# Patient Record
Sex: FEMALE | Race: WHITE | Employment: OTHER | URBAN - NONMETROPOLITAN AREA
[De-identification: names, ages, dates, MRNs, and addresses within clinical notes are randomized per-mention and may not be internally consistent; named-entity substitution may affect disease eponyms.]

---

## 2020-09-11 RX ORDER — FIBER
1 TABLET ORAL DAILY
COMMUNITY

## 2020-09-11 RX ORDER — MULTIVIT WITH MINERALS/HERBS
1 TABLET ORAL DAILY
COMMUNITY

## 2020-09-11 RX ORDER — SIMVASTATIN 20 MG/1
20 TABLET, FILM COATED ORAL DAILY
COMMUNITY
Start: 2016-07-21 | End: 2020-12-03

## 2020-09-11 RX ORDER — SPIRONOLACTONE 25 MG/1
25 TABLET ORAL DAILY
COMMUNITY
End: 2021-06-07

## 2020-09-11 RX ORDER — LOSARTAN POTASSIUM 100 MG/1
100 TABLET ORAL DAILY
COMMUNITY
End: 2021-02-11

## 2020-09-11 RX ORDER — CETIRIZINE HCL 10 MG
10 TABLET ORAL DAILY
COMMUNITY

## 2020-09-11 RX ORDER — VITAMIN E 268 MG
400 CAPSULE ORAL DAILY
COMMUNITY

## 2020-09-14 ENCOUNTER — HOSPITAL ENCOUNTER (OUTPATIENT)
Dept: LAB | Age: 75
Discharge: HOME | End: 2020-09-14
Payer: MEDICARE

## 2020-09-14 LAB
ALBUMIN SERPL-MCNC: 4.3 G/DL (ref 3.5–4.7)
ANION GAP SERPL CALC-SCNC: 10 MMOL/L
BUN SERPL-MCNC: 28 MG/DL (ref 9–21)
BUN/CREAT SERPL: 20
CA-I BLD-MCNC: 9.7 MG/DL (ref 8.5–10.5)
CHLORIDE SERPL-SCNC: 97 MMOL/L (ref 94–111)
CO2 SERPL-SCNC: 24 MMOL/L (ref 21–33)
CREAT SERPL-MCNC: 1.4 MG/DL (ref 0.7–1.2)
GLUCOSE SERPL-MCNC: 101 MG/DL (ref 70–110)
PHOSPHATE SERPL-MCNC: 3.3 MG/DL (ref 2.5–4.5)
POTASSIUM SERPL-SCNC: 4.7 MMOL/L (ref 3.2–5.1)
SODIUM SERPL-SCNC: 131 MMOL/L (ref 135–145)

## 2020-09-14 PROCEDURE — 80069 RENAL FUNCTION PANEL: CPT

## 2020-09-14 PROCEDURE — 36415 COLL VENOUS BLD VENIPUNCTURE: CPT

## 2020-09-24 ENCOUNTER — OFFICE VISIT (OUTPATIENT)
Dept: NEPHROLOGY | Age: 75
End: 2020-09-24
Payer: MEDICARE

## 2020-09-24 VITALS
HEART RATE: 97 BPM | HEIGHT: 65 IN | BODY MASS INDEX: 29.49 KG/M2 | SYSTOLIC BLOOD PRESSURE: 121 MMHG | WEIGHT: 177 LBS | DIASTOLIC BLOOD PRESSURE: 69 MMHG | TEMPERATURE: 97.1 F

## 2020-09-24 DIAGNOSIS — N17.9 AKI (ACUTE KIDNEY INJURY) (HCC): Primary | ICD-10-CM

## 2020-09-24 PROCEDURE — G8427 DOCREV CUR MEDS BY ELIG CLIN: HCPCS | Performed by: INTERNAL MEDICINE

## 2020-09-24 PROCEDURE — 99214 OFFICE O/P EST MOD 30 MIN: CPT | Performed by: INTERNAL MEDICINE

## 2020-09-24 RX ORDER — NILOTINIB 150 MG/1
300 CAPSULE ORAL EVERY 12 HOURS
COMMUNITY

## 2020-09-24 NOTE — PROGRESS NOTES
Fallon Portillo presents today for   Chief Complaint   Patient presents with    Routine     Routine visit with lab review        Is someone accompanying this pt? Patient is alone for her appt     Is the patient using any DME equipment during OV? None     Depression Screening:  3 most recent PHQ Screens 9/24/2020   Little interest or pleasure in doing things Not at all   Feeling down, depressed, irritable, or hopeless Not at all   Total Score PHQ 2 0       Learning Assessment:  No flowsheet data found. Abuse Screening:  No flowsheet data found. Fall Risk  Fall Risk Assessment, last 12 mths 9/24/2020   Able to walk? Yes   Fall in past 12 months? No       ADL  No flowsheet data found. Health Maintenance reviewed and discussed and ordered per Provider. Health Maintenance Due   Topic Date Due    Hepatitis C Screening  1945    Lipid Screen  11/12/1955    DTaP/Tdap/Td series (1 - Tdap) 11/12/1966    Shingrix Vaccine Age 50> (1 of 2) 11/12/1995    Breast Cancer Screen Mammogram  11/12/1995    FOBT Q1Y Age 50-75  11/12/1995    Bone Densitometry (Dexa) Screening  11/12/2010    Pneumococcal 65+ years (1 of 1 - PPSV23) 11/12/2010    Medicare Yearly Exam  03/12/2020    Flu Vaccine (1) 09/01/2020   . Coordination of Care:  1. Have you been to the ER, urgent care clinic since your last visit? Hospitalized since your last visit? no    2. Have you seen or consulted any other health care providers outside of the [address] [place] [address] since your last visit? Include any pap smears or colon screening.  no

## 2020-09-24 NOTE — PROGRESS NOTES
Renal Progress Note           Daily Progress Note: 9/24/2020      Subjective: The patient is seen for follow up of her hypertension. She denies any complaints at today's visit. Reports her blood pressure to be very well controlled. Denies any swelling anymore    Medications reviewed  Current Outpatient Medications   Medication Sig    nilotinib (Tasigna) 150 mg cap capsule Take 300 mg by mouth every twelve (12) hours. Take 2 tabs every 12 hours    simvastatin (ZOCOR) 20 mg tablet Take 20 mg by mouth daily.  cetirizine (ZYRTEC) 10 mg tablet Take 10 mg by mouth daily.  losartan (COZAAR) 100 mg tablet Take 100 mg by mouth daily.  multivitamin-min-iron-FA-vit K (Multi-Day Plus Minerals) 18 mg iron-400 mcg-25 mcg tab Take 1 Tab by mouth daily.  spironolactone (ALDACTONE) 50 mg tablet Take 50 mg by mouth two (2) times a day.  b complex vitamins tablet Take 1 Tab by mouth daily.  vitamin E (AQUA GEMS) 400 unit capsule Take 400 Units by mouth daily.  TURMERIC PO Take  by mouth. No current facility-administered medications for this visit. Review of Systems:   Was asked all review of system and she denies negative for everything other than the ones listed in HPI    Objective:   Physical Exam:     Visit Vitals  /69 (BP 1 Location: Left arm, BP Patient Position: Sitting)   Pulse 97   Temp 97.1 °F (36.2 °C) (Temporal)   Ht 5' 5\" (1.651 m)   Wt 80.3 kg (177 lb)   BMI 29.45 kg/m²             General:  Alert, cooperative, no distress, appears stated age. Lungs:   Clear to auscultation bilaterally. Chest wall:  No tenderness or deformity. Heart:  Regular rate and rhythm, S1, S2 normal, no murmur, click, rub or gallop. Abdomen:   Soft, non-tender. Bowel sounds normal. No masses,  No organomegaly. Extremities: Extremities normal, atraumatic, no cyanosis or edema. Pulses: 2+ and symmetric all extremities.    Skin: Skin color, texture, turgor normal. No rashes or lesions   Neurologic: CNII-XII intact. No gross sensory or motor deficits     Data Review:       Labs on 9/14 show sodium 131 potassium 4.7 chloride 97 CO2 24 creatinine 1.4 BUN 28 GFR of 37    Assessment/Plan:     1. Acute kidney injury  -Creatinine is increased to 1.4. Exact baseline creatinine is unknown  -I will repeat labs in about 4 weeks and if creatinine is not any better I will discontinue losartan and hold spironolactone to allow renal recovery  -She denies any needs at home  -clinically euvolemic  -Urine is bland. Quantify proteinuria    2. Hypertension  -Blood pressure is very well controlled after changes were made at the last visit. Swelling has gone after discontinuing amlodipine  -Losartan was started at the last visit instead of lisinopril because of dry cough. She has no dry cough now  -Hydrochlorothiazide was discontinued because of hypokalemia and spironolactone was increased to 50 mg twice daily  -I am decreasing spironolactone to 50 mg daily. Potassium is 4.7 on recent labs. She is also complaining of 1 and 2 low blood pressures reads at home    3. Bilateral leg edema  -This is resolved after discontinuing non-dihydropyridine calcium channel blockers starting spironolactone    4. Chronic hypokalemia  -Was likely secondary to hydrochlorothiazide which was discontinued at the last visit and spironolactone was started  -Potassium is 4.7 on recent labs. She has stopped taking potassium supplementation  -Repeat labs prior to next visit    5.   History of CML  -In remission for the past 5 years    I will see her back in the office in 2 months with Tamie Ernandez MD

## 2020-12-03 RX ORDER — SIMVASTATIN 20 MG/1
TABLET, FILM COATED ORAL
Qty: 30 TAB | Refills: 0 | Status: SHIPPED | OUTPATIENT
Start: 2020-12-03 | End: 2020-12-31

## 2020-12-07 ENCOUNTER — HOSPITAL ENCOUNTER (OUTPATIENT)
Dept: LAB | Age: 75
Discharge: HOME | End: 2020-12-07
Payer: MEDICARE

## 2020-12-07 DIAGNOSIS — N17.9 AKI (ACUTE KIDNEY INJURY) (HCC): ICD-10-CM

## 2020-12-07 LAB
ALBUMIN SERPL-MCNC: 4.2 G/DL (ref 3.5–4.7)
ANION GAP SERPL CALC-SCNC: 11 MMOL/L
APPEARANCE UR: CLEAR
BACTERIA URNS QL MICRO: ABNORMAL /HPF
BILIRUB UR QL: NEGATIVE
BUN SERPL-MCNC: 10 MG/DL (ref 9–21)
BUN/CREAT SERPL: 9
CA-I BLD-MCNC: 9.5 MG/DL (ref 8.5–10.5)
CA-I BLD-MCNC: 9.5 MG/DL (ref 8.5–10.5)
CHLORIDE SERPL-SCNC: 101 MMOL/L (ref 94–111)
CO2 SERPL-SCNC: 25 MMOL/L (ref 21–33)
COLOR UR: ABNORMAL
CREAT SERPL-MCNC: 1.1 MG/DL (ref 0.7–1.2)
EPITH CASTS URNS QL MICRO: ABNORMAL /LPF (ref 0–20)
GLUCOSE SERPL-MCNC: 99 MG/DL (ref 70–110)
GLUCOSE UR STRIP.AUTO-MCNC: NEGATIVE MG/DL
HGB UR QL STRIP: NEGATIVE
KETONES UR QL STRIP.AUTO: NEGATIVE MG/DL
LEUKOCYTE ESTERASE UR QL STRIP.AUTO: ABNORMAL
NITRITE UR QL STRIP.AUTO: NEGATIVE
PH UR STRIP: 6 [PH] (ref 5–9)
PHOSPHATE SERPL-MCNC: 3.1 MG/DL (ref 2.5–4.5)
POTASSIUM SERPL-SCNC: 4.1 MMOL/L (ref 3.2–5.1)
PROT UR STRIP-MCNC: NEGATIVE MG/DL
PTH-INTACT SERPL-MCNC: 41.9 PG/ML (ref 12–88)
RBC #/AREA URNS HPF: ABNORMAL /HPF (ref 0–2)
SODIUM SERPL-SCNC: 137 MMOL/L (ref 135–145)
SP GR UR REFRACTOMETRY: 1.01 (ref 1–1.03)
UROBILINOGEN UR QL STRIP.AUTO: 0.2 EU/DL (ref 0.2–1)
WBC URNS QL MICRO: ABNORMAL /HPF (ref 0–4)

## 2020-12-07 PROCEDURE — 36415 COLL VENOUS BLD VENIPUNCTURE: CPT

## 2020-12-07 PROCEDURE — 80069 RENAL FUNCTION PANEL: CPT

## 2020-12-07 PROCEDURE — 81001 URINALYSIS AUTO W/SCOPE: CPT

## 2020-12-07 PROCEDURE — 83970 ASSAY OF PARATHORMONE: CPT

## 2020-12-16 ENCOUNTER — OFFICE VISIT (OUTPATIENT)
Dept: NEPHROLOGY | Age: 75
End: 2020-12-16
Payer: MEDICARE

## 2020-12-16 VITALS
WEIGHT: 179 LBS | BODY MASS INDEX: 29.82 KG/M2 | HEIGHT: 65 IN | DIASTOLIC BLOOD PRESSURE: 82 MMHG | SYSTOLIC BLOOD PRESSURE: 136 MMHG

## 2020-12-16 DIAGNOSIS — N17.9 AKI (ACUTE KIDNEY INJURY) (HCC): Primary | ICD-10-CM

## 2020-12-16 PROCEDURE — 99214 OFFICE O/P EST MOD 30 MIN: CPT | Performed by: INTERNAL MEDICINE

## 2020-12-16 NOTE — PROGRESS NOTES
Renal Progress Note           Daily Progress Note: 12/16/2020      Subjective: The patient is seen for follow  up of CKD. HPI : Patient is here for a follow-up. Denies any complaints today. Denies any shortness of breath or swelling in the legs. Denies any chest pain. Denies any dysuria    Medications reviewed  Current Outpatient Medications   Medication Sig    folic acid-B complex,C no.17 5 mg tab Take  by mouth.  simvastatin (ZOCOR) 20 mg tablet TAKE 1 TABLET BY MOUTH ONCE DAILY (PATIENT  NEEDS  APPOINTMENT  FOR  FURTHER  REFILLS)    nilotinib (Tasigna) 150 mg cap capsule Take 300 mg by mouth every twelve (12) hours. Take 2 tabs every 12 hours    cetirizine (ZYRTEC) 10 mg tablet Take 10 mg by mouth daily.  losartan (COZAAR) 100 mg tablet Take 100 mg by mouth daily.  multivitamin-min-iron-FA-vit K (Multi-Day Plus Minerals) 18 mg iron-400 mcg-25 mcg tab Take 1 Tab by mouth daily.  spironolactone (ALDACTONE) 50 mg tablet Take 25 mg by mouth daily.  b complex vitamins tablet Take 1 Tab by mouth daily.  vitamin E (AQUA GEMS) 400 unit capsule Take 400 Units by mouth daily.  TURMERIC PO Take  by mouth. No current facility-administered medications for this visit. Review of Systems:     Constitutional: Negative for chills and fever. HENT: Negative. Eyes: Negative. Respiratory: Negative. Cardiovascular: Negative. Gastrointestinal: Negative for abdominal pain and nausea. Skin: Negative. Neurological: Negative. Objective:   Physical Exam:     Visit Vitals  /82 (BP 1 Location: Left arm, BP Patient Position: Sitting)   Ht 5' 5\" (1.651 m)   Wt 81.2 kg (179 lb)   BMI 29.79 kg/m²           @TMAX(24)@  [unfilled]   [unfilled]    General:  Alert, cooperative, no distress, appears stated age. Lungs:   Clear to auscultation bilaterally. Chest wall:  No tenderness or deformity.    Heart:  Regular rate and rhythm, S1, S2 normal, no murmur, click, rub or gallop. Abdomen:   Soft, non-tender. Bowel sounds normal. No masses,  No organomegaly. Extremities: Extremities normal, atraumatic, no cyanosis or edema. Pulses: 2+ and symmetric all extremities. Skin: Skin color, texture, turgor normal. No rashes or lesions   Neurologic: CNII-XII intact. No gross sensory or motor deficits     Data Review:       Recent Days:  Labs from 12/7 show sodium 137 potassium 4.1 chloride 101 CO2 23 glucose 99 BUN 10 creatinine 1.1 calcium 9.5 phosphorus 3.1 albumin 4.2    Assessment/Plan:     1. Acute kidney injury  -Creatinine is improved from 1.4-->1.1. Exact baseline creatinine is unknown  -I will continue losartan and spironolactone at current dose  -She denies any needs at home  -clinically euvolemic  -Urine is bland. Quantify proteinuria     2. Hypertension  -Blood pressure is very well controlled after changes were made at  Prior visit.    -Swelling has gone after discontinuing amlodipine  -Losartan was started instead of lisinopril because of dry cough. She has no dry cough now  -Hydrochlorothiazide was discontinued because of hypokalemia and spironolactone was decreased to 25 mg daily because of low blood pressure readings. Potassium is 4.1 on recent labs. 3.  Bilateral leg edema  -This is resolved after discontinuing non-dihydropyridine calcium channel blockers starting spironolactone     4. Chronic hypokalemia  -Was likely secondary to hydrochlorothiazide which was discontinued at the last visit and spironolactone was started  -Potassium is 4.7 on recent labs. She has stopped taking potassium supplementation  -Repeat labs prior to next visit     5.   History of CML  -In remission for the past 5 years     I will see her back in the office in 2 months with Berny Rush MD

## 2020-12-16 NOTE — PROGRESS NOTES
Damian Youssef presents today for   Chief Complaint   Patient presents with    Follow-up     2 month       Is someone accompanying this pt? no    Is the patient using any DME equipment during OV? no    Depression Screening:  3 most recent PHQ Screens 12/16/2020   Little interest or pleasure in doing things Not at all   Feeling down, depressed, irritable, or hopeless Not at all   Total Score PHQ 2 0       Learning Assessment:  No flowsheet data found. Abuse Screening:  No flowsheet data found. Fall Risk  Fall Risk Assessment, last 12 mths 12/16/2020   Able to walk? Yes   Fall in past 12 months? No       Coordination of Care:  1. Have you been to the ER, urgent care clinic since your last visit? Hospitalized since your last visit? no    2. Have you seen or consulted any other health care providers outside of the [address] [place] [address] since your last visit? Include any pap smears or colon screening.  no

## 2020-12-31 RX ORDER — SIMVASTATIN 20 MG/1
TABLET, FILM COATED ORAL
Qty: 30 TAB | Refills: 0 | Status: SHIPPED | OUTPATIENT
Start: 2020-12-31 | End: 2021-01-11

## 2021-01-11 RX ORDER — SIMVASTATIN 20 MG/1
TABLET, FILM COATED ORAL
Qty: 30 TAB | Refills: 0 | Status: SHIPPED | OUTPATIENT
Start: 2021-01-11 | End: 2021-03-09

## 2021-01-28 ENCOUNTER — OFFICE VISIT (OUTPATIENT)
Dept: FAMILY MEDICINE CLINIC | Age: 76
End: 2021-01-28
Payer: MEDICARE

## 2021-01-28 VITALS
HEART RATE: 78 BPM | OXYGEN SATURATION: 97 % | SYSTOLIC BLOOD PRESSURE: 138 MMHG | DIASTOLIC BLOOD PRESSURE: 82 MMHG | RESPIRATION RATE: 19 BRPM | WEIGHT: 178 LBS | HEIGHT: 65 IN | BODY MASS INDEX: 29.66 KG/M2

## 2021-01-28 DIAGNOSIS — Z13.31 SCREENING FOR DEPRESSION: ICD-10-CM

## 2021-01-28 DIAGNOSIS — E03.9 HYPOTHYROIDISM, UNSPECIFIED TYPE: ICD-10-CM

## 2021-01-28 DIAGNOSIS — E78.2 MIXED HYPERLIPIDEMIA: ICD-10-CM

## 2021-01-28 DIAGNOSIS — C92.10 CML (CHRONIC MYELOCYTIC LEUKEMIA) (HCC): Primary | ICD-10-CM

## 2021-01-28 DIAGNOSIS — E53.8 VITAMIN B12 DEFICIENCY: ICD-10-CM

## 2021-01-28 DIAGNOSIS — Z00.00 MEDICARE ANNUAL WELLNESS VISIT, SUBSEQUENT: ICD-10-CM

## 2021-01-28 DIAGNOSIS — Z11.59 NEED FOR HEPATITIS C SCREENING TEST: ICD-10-CM

## 2021-01-28 DIAGNOSIS — Z13.39 SCREENING FOR ALCOHOLISM: ICD-10-CM

## 2021-01-28 DIAGNOSIS — Z13.29 SCREENING FOR THYROID DISORDER: ICD-10-CM

## 2021-01-28 DIAGNOSIS — E55.9 VITAMIN D DEFICIENCY: ICD-10-CM

## 2021-01-28 PROCEDURE — G8536 NO DOC ELDER MAL SCRN: HCPCS | Performed by: NURSE PRACTITIONER

## 2021-01-28 PROCEDURE — G8419 CALC BMI OUT NRM PARAM NOF/U: HCPCS | Performed by: NURSE PRACTITIONER

## 2021-01-28 PROCEDURE — 1101F PT FALLS ASSESS-DOCD LE1/YR: CPT | Performed by: NURSE PRACTITIONER

## 2021-01-28 PROCEDURE — G8400 PT W/DXA NO RESULTS DOC: HCPCS | Performed by: NURSE PRACTITIONER

## 2021-01-28 PROCEDURE — G8432 DEP SCR NOT DOC, RNG: HCPCS | Performed by: NURSE PRACTITIONER

## 2021-01-28 PROCEDURE — 99214 OFFICE O/P EST MOD 30 MIN: CPT | Performed by: NURSE PRACTITIONER

## 2021-01-28 PROCEDURE — 3017F COLORECTAL CA SCREEN DOC REV: CPT | Performed by: NURSE PRACTITIONER

## 2021-01-28 PROCEDURE — G0439 PPPS, SUBSEQ VISIT: HCPCS | Performed by: NURSE PRACTITIONER

## 2021-01-28 PROCEDURE — 1090F PRES/ABSN URINE INCON ASSESS: CPT | Performed by: NURSE PRACTITIONER

## 2021-01-28 PROCEDURE — G8427 DOCREV CUR MEDS BY ELIG CLIN: HCPCS | Performed by: NURSE PRACTITIONER

## 2021-01-28 NOTE — PROGRESS NOTES
Rachel Javed is a 76 y. o.female presents with   Chief Complaint   Patient presents with    Annual Wellness Visit       28-year-old female presents today in office for annual Medicare wellness exam as well as routine follow-up related to essential hypertension she does not have any current blood pressure readings for me today however she is also followed by Dr. Gavin Armendariz nephrology. Her last visit with her according to her note was December 2020. Blood pressure much better controlled currently. Patient does have history of CML currently at 6-year palmer remission. Patient followed by [facility] related to mitral valve prolapse she verbalizes no complaints of today. Subjective:           Past Medical History:   Diagnosis Date    Aortic valve calcification     Cancer ([address]     Leukemia     Hypercholesterolemia     Hypertension     MVP (mitral valve prolapse)      Past Surgical History:   Procedure Laterality Date    HX CATARACT REMOVAL  2008    HX CATARACT REMOVAL  2013    HX COLONOSCOPY  2008    1 polyp     HX COLONOSCOPY  2013    normal repeat 2018    HX MALIGNANT SKIN LESION EXCISION      HX TUBAL LIGATION       Social History     Socioeconomic History    Marital status:      Spouse name: Not on file    Number of children: Not on file    Years of education: Not on file    Highest education level: Not on file   Tobacco Use    Smoking status: Never Smoker    Smokeless tobacco: Never Used   Substance and Sexual Activity    Alcohol use: Not Currently     Comment: occasional    Drug use: Not Currently     Current Outpatient Medications   Medication Sig Dispense Refill    simvastatin (ZOCOR) 20 mg tablet TAKE 1 TABLET BY MOUTH ONCE DAILY . APPOINTMENT REQUIRED FOR FUTURE REFILLS 30 Tab 0    folic acid-B complex,C no.17 5 mg tab Take  by mouth.  nilotinib (Tasigna) 150 mg cap capsule Take 300 mg by mouth every twelve (12) hours.  Take 2 tabs every 12 hours      cetirizine (ZYRTEC) 10 mg tablet Take 10 mg by mouth daily.  losartan (COZAAR) 100 mg tablet Take 100 mg by mouth daily.  multivitamin-min-iron-FA-vit K (Multi-Day Plus Minerals) 18 mg iron-400 mcg-25 mcg tab Take 1 Tab by mouth daily.  spironolactone (ALDACTONE) 25 mg tablet Take 25 mg by mouth daily.  b complex vitamins tablet Take 1 Tab by mouth daily.  vitamin E (AQUA GEMS) 400 unit capsule Take 400 Units by mouth daily.  TURMERIC PO Take  by mouth. Allergies   Allergen Reactions    Pneumovax-23 [Pneumococcal 23-Alicia Ps Vaccine] Swelling     The patient has a family history of    REVIEW OF SYSTEMS  Review of Systems   Constitutional: Negative for chills and fever. HENT: Negative for ear discharge, ear pain, hearing loss, sinus pain and sore throat. Eyes: Negative for pain. Respiratory: Negative for cough and shortness of breath. Cardiovascular: Negative for chest pain, palpitations and leg swelling. Gastrointestinal: Negative for abdominal pain, nausea and vomiting. Genitourinary: Negative for dysuria, frequency and urgency. Musculoskeletal: Negative for falls, myalgias and neck pain. Skin: Negative for rash. Neurological: Negative for dizziness, tingling, tremors and headaches. Psychiatric/Behavioral: Negative for depression, substance abuse and suicidal ideas. The patient is not nervous/anxious and does not have insomnia.         Objective:     Visit Vitals  /82 (BP 1 Location: Left arm, BP Patient Position: Sitting)   Pulse 78   Resp 19   Ht 5' 5\" (1.651 m)   Wt 178 lb (80.7 kg)   SpO2 97%   BMI 29.62 kg/m²       Current Outpatient Medications   Medication Instructions    b complex vitamins tablet 1 Tab, Oral, DAILY    cetirizine (ZYRTEC) 10 mg, Oral, DAILY    folic acid-B complex,C no.17 5 mg tab Oral    losartan (COZAAR) 100 mg, Oral, DAILY    multivitamin-min-iron-FA-vit K (Multi-Day Plus Minerals) 18 mg iron-400 mcg-25 mcg tab 1 Tab, Oral, DAILY    simvastatin (ZOCOR) 20 mg tablet TAKE 1 TABLET BY MOUTH ONCE DAILY . APPOINTMENT REQUIRED FOR FUTURE REFILLS    spironolactone (ALDACTONE) 25 mg, Oral, DAILY    Tasigna 300 mg, Oral, EVERY 12 HOURS, Take 2 tabs every 12 hours     TURMERIC PO Oral    vitamin E (AQUA GEMS) 400 Units, Oral, DAILY        PHYSICAL EXAM  Physical Exam  Constitutional:       General: She is not in acute distress. Appearance: She is not ill-appearing. HENT:      Head: Normocephalic and atraumatic. Right Ear: Tympanic membrane, ear canal and external ear normal.      Left Ear: Tympanic membrane, ear canal and external ear normal.      Nose: Nose normal.      Mouth/Throat:      Mouth: Mucous membranes are moist.      Pharynx: Oropharynx is clear. Eyes:      Extraocular Movements: Extraocular movements intact. Conjunctiva/sclera: Conjunctivae normal.      Pupils: Pupils are equal, round, and reactive to light. Neck:      Musculoskeletal: Normal range of motion and neck supple. No neck rigidity or muscular tenderness. Cardiovascular:      Rate and Rhythm: Normal rate and regular rhythm. Pulses: Normal pulses. Heart sounds: Normal heart sounds. Pulmonary:      Effort: Pulmonary effort is normal.      Breath sounds: Normal breath sounds. Musculoskeletal: Normal range of motion. Lymphadenopathy:      Cervical: No cervical adenopathy. Skin:     General: Skin is warm and dry. Capillary Refill: Capillary refill takes less than 2 seconds. Neurological:      General: No focal deficit present. Mental Status: She is alert and oriented to person, place, and time. Cranial Nerves: No cranial nerve deficit. Psychiatric:         Mood and Affect: Mood normal.         Behavior: Behavior normal.         Thought Content: Thought content normal.         Judgment: Judgment normal.         Assessment/Plan:     Diagnoses and all orders for this visit:    1.  CML (chronic myelocytic leukemia) (HCC)  -     CBC W/O DIFF; Future  -     METABOLIC PANEL, COMPREHENSIVE; Future  6 years remission    2. Medicare annual wellness visit, subsequent    3. Screening for alcoholism    4. Screening for depression  -     IA ANNUAL ALCOHOL SCREEN 15 MIN  -     DEPRESSION SCREEN ANNUAL    5. Need for hepatitis C screening test  -     HEPATITIS C AB; Future  -Labs today any changes to current treatment contingent upon those findings    6. Vitamin B12 deficiency  -     VITAMIN B12; Future  -Labs today any changes to current treatment contingent upon those findings    7. Screening for thyroid disorder  -     TSH 3RD GENERATION; Future  -Labs today any changes to current treatment contingent upon those findings    8. Mixed hyperlipidemia  -     LIPID PANEL; Future  -Labs today any changes to current treatment contingent upon those findings    9. Vitamin D deficiency  -     VITAMIN D, 25 HYDROXY; Future  -Labs today any changes to current treatment contingent upon those findings    10. Hypothyroidism, unspecified type   -     TSH 3RD GENERATION; Future    -Labs today any changes to current treatment contingent upon those findings      Follow-up and Dispositions    · Return in about 6 months (around 7/28/2021) for in office, routine. Disclaimer:    I have discussed the diagnosis with the patient and the intended plan as seen above. The patient understands our medical plan. The risks, benefits and significant side effects of all medications have been reviewed. Anticipated time course and progression of condition reviewed. All questions have been addressed. She received an after visit summary, with information reviewed, and questions answered. Where appropriate, she is instructed to call the clinic if she has not been notified either by phone or through [address] with the results of her tests or with an appointment plan for any referrals within 1 week(s).  The patient  is to call if her condition worsens or fails to improve or if significant side effects are experienced.        Lurlean Habermann, NP

## 2021-01-28 NOTE — PATIENT INSTRUCTIONS
Medicare Wellness Visit, Female The best way to live healthy is to have a lifestyle where you eat a well-balanced diet, exercise regularly, limit alcohol use, and quit all forms of tobacco/nicotine, if applicable. Regular preventive services are another way to keep healthy. Preventive services (vaccines, screening tests, monitoring & exams) can help personalize your care plan, which helps you manage your own care. Screening tests can find health problems at the earliest stages, when they are easiest to treat. [facility] follows the current, evidence-based guidelines published by the [facility] ([facility]) when recommending preventive services for our patients. Because we follow these guidelines, sometimes recommendations change over time as research supports it. (For example, mammograms used to be recommended annually. Even though Medicare will still pay for an annual mammogram, the newer guidelines recommend a mammogram every two years for women of average risk). Of course, you and your doctor may decide to screen more often for some diseases, based on your risk and your co-morbidities (chronic disease you are already diagnosed with). Preventive services for you include: - Medicare offers their members a free annual wellness visit, which is time for you and your primary care provider to discuss and plan for your preventive service needs. Take advantage of this benefit every year! 
-All adults over the age of 72 should receive the recommended pneumonia vaccines. Current USPSTF guidelines recommend a series of two vaccines for the best pneumonia protection.  
-All adults should have a flu vaccine yearly and a tetanus vaccine every 10 years.  
-All adults age 48 and older should receive the shingles vaccines (series of two vaccines). -All adults age 38-68 who are overweight should have a diabetes screening test once every three years. -All adults born between [birth year] and 1965 should be screened once for Hepatitis C. 
-Other screening tests and preventive services for persons with diabetes include: an eye exam to screen for diabetic retinopathy, a kidney function test, a foot exam, and stricter control over your cholesterol.  
-Cardiovascular screening for adults with routine risk involves an electrocardiogram (ECG) at intervals determined by your doctor.  
-Colorectal cancer screenings should be done for adults age 54-65 with no increased risk factors for colorectal cancer. There are a number of acceptable methods of screening for this type of cancer. Each test has its own benefits and drawbacks. Discuss with your doctor what is most appropriate for you during your annual wellness visit. The different tests include: colonoscopy (considered the best screening method), a fecal occult blood test, a fecal DNA test, and sigmoidoscopy. 
 
-A bone mass density test is recommended when a woman turns 65 to screen for osteoporosis. This test is only recommended one time, as a screening. Some providers will use this same test as a disease monitoring tool if you already have osteoporosis. -Breast cancer screenings are recommended every other year for women of normal risk, age 54-69. 
-Cervical cancer screenings for women over age 72 are only recommended with certain risk factors. Here is a list of your current Health Maintenance items (your personalized list of preventive services) with a due date: 
Health Maintenance Due Topic Date Due  
 Hepatitis C Test  1945  Cholesterol Test   11/12/1955  COVID-19 Vaccine (1 of 2) 11/12/1961  
 DTaP/Tdap/Td  (1 - Tdap) 11/12/1966  Shingles Vaccine (1 of 2) 11/12/1995  Colorectal Screening  11/12/1995  Bone Mineral Density   11/12/2010  Pneumococcal Vaccine (1 of 1 - PPSV23) 11/12/2010  Yearly Flu Vaccine (1) 09/01/2020

## 2021-01-28 NOTE — PROGRESS NOTES
This is the Subsequent Medicare Annual Wellness Exam, performed 12 months or more after the Initial AWV or the last Subsequent AWV    I have reviewed the patient's medical history in detail and updated the computerized patient record. Depression Risk Factor Screening:     3 most recent PHQ Screens 1/28/2021   Little interest or pleasure in doing things Not at all   Feeling down, depressed, irritable, or hopeless Not at all   Total Score PHQ 2 0       Alcohol Risk Screen    Do you average more than 1 drink per night or more than 7 drinks a week:  No    On any one occasion in the past three months have you have had more than 3 drinks containing alcohol:  No        Functional Ability and Level of Safety:    Hearing: Hearing is good. Activities of Daily Living: The home contains: handrails  Patient does total self care      Ambulation: with no difficulty     Fall Risk:  Fall Risk Assessment, last 12 mths 1/28/2021   Able to walk? Yes   Fall in past 12 months? 0   Do you feel unsteady? 0   Are you worried about falling 0      Abuse Screen:  Patient is not abused       Cognitive Screening    Has your family/caregiver stated any concerns about your memory: no         Assessment/Plan   Education and counseling provided:  Are appropriate based on today's review and evaluation    Diagnoses and all orders for this visit:    1. Medicare annual wellness visit, subsequent    2. Screening for alcoholism    3.  Screening for depression  -     DEPRESSION SCREEN ANNUAL    Other orders  -     AL ANNUAL ALCOHOL SCREEN 15 MIN        Health Maintenance Due     Health Maintenance Due   Topic Date Due    Hepatitis C Screening  1945    Lipid Screen  11/12/1955    DTaP/Tdap/Td series (1 - Tdap) 11/12/1966    Shingrix Vaccine Age 50> (1 of 2) 11/12/1995    Colorectal Cancer Screening Combo  11/12/1995    Bone Densitometry (Dexa) Screening  11/12/2010     Patient refused:   Influenza vaccine   COVID vaccine   Pneumonia Vaccine (ALLERGY TO VACCINE)   Colonoscopy order      Patient Care Team   Patient Care Team:  Rob Mitchell NP as PCP - General (Nurse Practitioner)  Rob Mitchell NP as PCP - [facility] Empaneled Provider    History   There is no problem list on file for this patient. Past Medical History:   Diagnosis Date    Aortic valve calcification     Cancer (HCC)     Leukemia     Hypercholesterolemia     Hypertension     MVP (mitral valve prolapse)       Past Surgical History:   Procedure Laterality Date    HX CATARACT REMOVAL  2008    HX CATARACT REMOVAL  2013    HX COLONOSCOPY  2008    1 polyp     HX COLONOSCOPY  2013    normal repeat 2018    HX MALIGNANT SKIN LESION EXCISION      HX TUBAL LIGATION       Current Outpatient Medications   Medication Sig Dispense Refill    simvastatin (ZOCOR) 20 mg tablet TAKE 1 TABLET BY MOUTH ONCE DAILY . APPOINTMENT REQUIRED FOR FUTURE REFILLS 30 Tab 0    folic acid-B complex,C no.17 5 mg tab Take  by mouth.  nilotinib (Tasigna) 150 mg cap capsule Take 300 mg by mouth every twelve (12) hours. Take 2 tabs every 12 hours      cetirizine (ZYRTEC) 10 mg tablet Take 10 mg by mouth daily.  losartan (COZAAR) 100 mg tablet Take 100 mg by mouth daily.  multivitamin-min-iron-FA-vit K (Multi-Day Plus Minerals) 18 mg iron-400 mcg-25 mcg tab Take 1 Tab by mouth daily.  spironolactone (ALDACTONE) 25 mg tablet Take 25 mg by mouth daily.  b complex vitamins tablet Take 1 Tab by mouth daily.  vitamin E (AQUA GEMS) 400 unit capsule Take 400 Units by mouth daily.  TURMERIC PO Take  by mouth.        Allergies   Allergen Reactions    Pneumovax-23 [Pneumococcal 23-Alicia Ps Vaccine] Swelling       Family History   Problem Relation Age of Onset    Alzheimer Mother     Cancer Father         lung     Social History     Tobacco Use    Smoking status: Never Smoker    Smokeless tobacco: Never Used   Substance Use Topics    Alcohol use: Not Currently     Comment: occasional

## 2021-01-29 LAB
25(OH)D3+25(OH)D2 SERPL-MCNC: 47 NG/ML (ref 30–100)
ALBUMIN SERPL-MCNC: 4.2 G/DL (ref 3.7–4.7)
ALBUMIN/GLOB SERPL: 1.8 {RATIO} (ref 1.2–2.2)
ALP SERPL-CCNC: 75 IU/L (ref 39–117)
ALT SERPL-CCNC: 23 IU/L (ref 0–32)
AST SERPL-CCNC: 25 IU/L (ref 0–40)
BILIRUB SERPL-MCNC: 1.4 MG/DL (ref 0–1.2)
BUN SERPL-MCNC: 10 MG/DL (ref 8–27)
BUN/CREAT SERPL: 11 (ref 12–28)
CALCIUM SERPL-MCNC: 9.4 MG/DL (ref 8.7–10.3)
CHLORIDE SERPL-SCNC: 104 MMOL/L (ref 96–106)
CHOLEST SERPL-MCNC: 203 MG/DL (ref 100–199)
CO2 SERPL-SCNC: 23 MMOL/L (ref 20–29)
CREAT SERPL-MCNC: 0.92 MG/DL (ref 0.57–1)
ERYTHROCYTE [DISTWIDTH] IN BLOOD BY AUTOMATED COUNT: 11.6 % (ref 11.7–15.4)
GLOBULIN SER CALC-MCNC: 2.3 G/DL (ref 1.5–4.5)
GLUCOSE SERPL-MCNC: 93 MG/DL (ref 65–99)
HCT VFR BLD AUTO: 40 % (ref 34–46.6)
HCV AB S/CO SERPL IA: <0.1 S/CO RATIO (ref 0–0.9)
HDLC SERPL-MCNC: 70 MG/DL
HGB BLD-MCNC: 13.7 G/DL (ref 11.1–15.9)
LDLC SERPL CALC-MCNC: 115 MG/DL (ref 0–99)
MCH RBC QN AUTO: 30 PG (ref 26.6–33)
MCHC RBC AUTO-ENTMCNC: 34.3 G/DL (ref 31.5–35.7)
MCV RBC AUTO: 88 FL (ref 79–97)
PLATELET # BLD AUTO: 263 X10E3/UL (ref 150–450)
POTASSIUM SERPL-SCNC: 4.1 MMOL/L (ref 3.5–5.2)
PROT SERPL-MCNC: 6.5 G/DL (ref 6–8.5)
RBC # BLD AUTO: 4.56 X10E6/UL (ref 3.77–5.28)
SODIUM SERPL-SCNC: 138 MMOL/L (ref 134–144)
TRIGL SERPL-MCNC: 104 MG/DL (ref 0–149)
TSH SERPL DL<=0.005 MIU/L-ACNC: 2.18 UIU/ML (ref 0.45–4.5)
VIT B12 SERPL-MCNC: 524 PG/ML (ref 232–1245)
VLDLC SERPL CALC-MCNC: 18 MG/DL (ref 5–40)
WBC # BLD AUTO: 5.3 X10E3/UL (ref 3.4–10.8)

## 2021-02-11 RX ORDER — LOSARTAN POTASSIUM 100 MG/1
TABLET ORAL
Qty: 90 TAB | Refills: 0 | Status: SHIPPED | OUTPATIENT
Start: 2021-02-11 | End: 2021-06-28

## 2021-04-05 ENCOUNTER — HOSPITAL ENCOUNTER (OUTPATIENT)
Dept: LAB | Age: 76
Discharge: HOME | End: 2021-04-05
Payer: MEDICARE

## 2021-04-05 DIAGNOSIS — N17.9 AKI (ACUTE KIDNEY INJURY) (HCC): ICD-10-CM

## 2021-04-05 LAB
ALBUMIN SERPL-MCNC: 4.3 G/DL (ref 3.5–4.7)
ANION GAP SERPL CALC-SCNC: 13 MMOL/L
APPEARANCE UR: CLEAR
BACTERIA URNS QL MICRO: ABNORMAL /HPF
BILIRUB UR QL: NEGATIVE
BUN SERPL-MCNC: 17 MG/DL (ref 9–21)
BUN/CREAT SERPL: 17
CA-I BLD-MCNC: 9.5 MG/DL (ref 8.5–10.5)
CA-I BLD-MCNC: 9.5 MG/DL (ref 8.5–10.5)
CHLORIDE SERPL-SCNC: 98 MMOL/L (ref 94–111)
CO2 SERPL-SCNC: 25 MMOL/L (ref 21–33)
COLOR UR: ABNORMAL
CREAT SERPL-MCNC: 1 MG/DL (ref 0.7–1.2)
EPITH CASTS URNS QL MICRO: ABNORMAL /LPF (ref 0–20)
GLUCOSE SERPL-MCNC: 88 MG/DL (ref 70–110)
GLUCOSE UR STRIP.AUTO-MCNC: NEGATIVE MG/DL
HGB UR QL STRIP: NEGATIVE
KETONES UR QL STRIP.AUTO: NEGATIVE MG/DL
LEUKOCYTE ESTERASE UR QL STRIP.AUTO: NEGATIVE
NITRITE UR QL STRIP.AUTO: NEGATIVE
PH UR STRIP: 6 [PH] (ref 5–9)
PHOSPHATE SERPL-MCNC: 3.3 MG/DL (ref 2.5–4.5)
POTASSIUM SERPL-SCNC: 4.3 MMOL/L (ref 3.2–5.1)
PROT UR STRIP-MCNC: NEGATIVE MG/DL
PTH-INTACT SERPL-MCNC: 54.6 PG/ML (ref 12–88)
RBC #/AREA URNS HPF: ABNORMAL /HPF (ref 0–2)
SODIUM SERPL-SCNC: 136 MMOL/L (ref 135–145)
SP GR UR REFRACTOMETRY: 1.01 (ref 1–1.03)
UROBILINOGEN UR QL STRIP.AUTO: 0.2 EU/DL (ref 0.2–1)
WBC URNS QL MICRO: ABNORMAL /HPF (ref 0–4)

## 2021-04-05 PROCEDURE — 36415 COLL VENOUS BLD VENIPUNCTURE: CPT

## 2021-04-05 PROCEDURE — 83970 ASSAY OF PARATHORMONE: CPT

## 2021-04-05 PROCEDURE — 80069 RENAL FUNCTION PANEL: CPT

## 2021-04-05 PROCEDURE — 81001 URINALYSIS AUTO W/SCOPE: CPT

## 2021-04-22 ENCOUNTER — OFFICE VISIT (OUTPATIENT)
Dept: NEPHROLOGY | Age: 76
End: 2021-04-22
Payer: MEDICARE

## 2021-04-22 VITALS
SYSTOLIC BLOOD PRESSURE: 138 MMHG | HEART RATE: 69 BPM | BODY MASS INDEX: 29.12 KG/M2 | WEIGHT: 175 LBS | DIASTOLIC BLOOD PRESSURE: 67 MMHG

## 2021-04-22 DIAGNOSIS — N18.31 STAGE 3A CHRONIC KIDNEY DISEASE (HCC): ICD-10-CM

## 2021-04-22 DIAGNOSIS — N17.9 AKI (ACUTE KIDNEY INJURY) (HCC): Primary | ICD-10-CM

## 2021-04-22 PROCEDURE — 1090F PRES/ABSN URINE INCON ASSESS: CPT | Performed by: INTERNAL MEDICINE

## 2021-04-22 PROCEDURE — 3017F COLORECTAL CA SCREEN DOC REV: CPT | Performed by: INTERNAL MEDICINE

## 2021-04-22 PROCEDURE — G8510 SCR DEP NEG, NO PLAN REQD: HCPCS | Performed by: INTERNAL MEDICINE

## 2021-04-22 PROCEDURE — G8419 CALC BMI OUT NRM PARAM NOF/U: HCPCS | Performed by: INTERNAL MEDICINE

## 2021-04-22 PROCEDURE — 1101F PT FALLS ASSESS-DOCD LE1/YR: CPT | Performed by: INTERNAL MEDICINE

## 2021-04-22 PROCEDURE — G8427 DOCREV CUR MEDS BY ELIG CLIN: HCPCS | Performed by: INTERNAL MEDICINE

## 2021-04-22 PROCEDURE — G8400 PT W/DXA NO RESULTS DOC: HCPCS | Performed by: INTERNAL MEDICINE

## 2021-04-22 PROCEDURE — 99213 OFFICE O/P EST LOW 20 MIN: CPT | Performed by: INTERNAL MEDICINE

## 2021-04-22 PROCEDURE — G8536 NO DOC ELDER MAL SCRN: HCPCS | Performed by: INTERNAL MEDICINE

## 2021-04-22 NOTE — PROGRESS NOTES
Renal Progress Note           Daily Progress Note: 4/22/2021      Subjective: The patient is seen for follow  up of CKD. HPI : Patient is here for a follow-up. Denies any complaints today. Denies any shortness of breath or swelling in the legs. Denies any chest pain. Denies any dysuria  Blood pressure is very well controlled in the office today    Medications reviewed  Current Outpatient Medications   Medication Sig    simvastatin (ZOCOR) 20 mg tablet TAKE 1 TABLET BY MOUTH ONCE DAILY -  APPOINTMENT  REQUIRED  FOR  FUTURE  REFILLS    losartan (COZAAR) 100 mg tablet Take 1 tablet by mouth once daily for 90 days    folic acid-B complex,C no.17 5 mg tab Take  by mouth.  nilotinib (Tasigna) 150 mg cap capsule Take 300 mg by mouth every twelve (12) hours. Take 2 tabs every 12 hours    cetirizine (ZYRTEC) 10 mg tablet Take 10 mg by mouth daily.  multivitamin-min-iron-FA-vit K (Multi-Day Plus Minerals) 18 mg iron-400 mcg-25 mcg tab Take 1 Tab by mouth daily.  spironolactone (ALDACTONE) 25 mg tablet Take 25 mg by mouth daily.  b complex vitamins tablet Take 1 Tab by mouth daily.  vitamin E (AQUA GEMS) 400 unit capsule Take 400 Units by mouth daily.  TURMERIC PO Take  by mouth. No current facility-administered medications for this visit. Review of Systems:     Constitutional: Negative for chills and fever. HENT: Negative. Eyes: Negative. Respiratory: Negative. Cardiovascular: Negative. Gastrointestinal: Negative for abdominal pain and nausea. Skin: Negative. Neurological: Negative. Objective:   Physical Exam:     Visit Vitals  /67 (BP 1 Location: Right arm, BP Patient Position: Sitting)   Pulse 69   Wt 79.4 kg (175 lb)   BMI 29.12 kg/m²           @TMAX(24)@  [unfilled]   [unfilled]    General:  Alert, cooperative, no distress, appears stated age. Lungs:   Clear to auscultation bilaterally.    Chest wall:  No tenderness or deformity. Heart:  Regular rate and rhythm, S1, S2 normal, no murmur, click, rub or gallop. Abdomen:   Soft, non-tender. Bowel sounds normal. No masses,  No organomegaly. Extremities: Extremities normal, atraumatic, no cyanosis or edema. Pulses: 2+ and symmetric all extremities. Skin: Skin color, texture, turgor normal. No rashes or lesions   Neurologic: CNII-XII intact. No gross sensory or motor deficits     Data Review:       Recent labs    4/5/2021  Sodium 136 potassium 4.3 chloride 98 CO2 25 glucose 88 BUN 17 creatinine 1 calcium 9.5 phosphorus 3.3 albumin 4.3    Labs from 12/7 show sodium 137 potassium 4.1 chloride 101 CO2 23 glucose 99 BUN 10 creatinine 1.1 calcium 9.5 phosphorus 3.1 albumin 4.2    Assessment/Plan:     1. Acute kidney injury  -Creatinine is improved from 1.4-->1.0. Exact baseline creatinine is unknown  -I will continue losartan and spironolactone at current dose  -She denies any needs at home  -clinically euvolemic  -Urine is bland. Quantify proteinuria     2. Hypertension  -Blood pressure is very well controlled after changes were made at  Prior visit.    -Swelling has gone after discontinuing amlodipine  -Losartan was started instead of lisinopril because of dry cough. She has no dry cough now  -Hydrochlorothiazide was discontinued because of hypokalemia and spironolactone was decreased to 25 mg daily because of low blood pressure readings. Potassium is 4.1 on recent labs. 3.  Bilateral leg edema  -This is resolved after discontinuing non-dihydropyridine calcium channel blockers starting spironolactone     4. Chronic hypokalemia  -Was likely secondary to hydrochlorothiazide which was discontinued at the last visit and spironolactone was started  -Potassium is 4.3 on recent labs. She has stopped taking potassium supplementation. Continue to hold  -Repeat labs prior to next visit     5.   History of CML  -In remission for the past 5 years     I will see her back in the office in 6 months with pre-clinic labs        Gem Beasley MD

## 2021-04-22 NOTE — PROGRESS NOTES
Renay Aguirre presents today for   Chief Complaint   Patient presents with    Follow-up     MELISA       Is someone accompanying this pt? no    Is the patient using any DME equipment during OV? no    Depression Screening:  3 most recent PHQ Screens 4/22/2021   Little interest or pleasure in doing things Not at all   Feeling down, depressed, irritable, or hopeless Not at all   Total Score PHQ 2 0       Learning Assessment:  No flowsheet data found. Fall Risk  Fall Risk Assessment, last 12 mths 4/22/2021   Able to walk? Yes   Fall in past 12 months? 0   Do you feel unsteady? 0   Are you worried about falling 0       Coordination of Care:  1. Have you been to the ER, urgent care clinic since your last visit? Hospitalized since your last visit? no    2. Have you seen or consulted any other health care providers outside of the [address] [place] [address] since your last visit? Include any pap smears or colon screening.  Yes, PCP Mandeep Wiseman

## 2021-06-07 RX ORDER — SPIRONOLACTONE 25 MG/1
TABLET ORAL
Qty: 60 TABLET | Refills: 0 | Status: SHIPPED | OUTPATIENT
Start: 2021-06-07 | End: 2021-08-11

## 2021-06-08 ENCOUNTER — TELEPHONE (OUTPATIENT)
Dept: FAMILY MEDICINE CLINIC | Age: 76
End: 2021-06-08

## 2021-06-08 NOTE — TELEPHONE ENCOUNTER
Left VM with pt saying that she would need to see someone in [facility] [place] due to lack of availability      She called about filling a Rx for sinus infection

## 2021-06-28 RX ORDER — LOSARTAN POTASSIUM 100 MG/1
TABLET ORAL
Qty: 90 TABLET | Refills: 0 | Status: SHIPPED | OUTPATIENT
Start: 2021-06-28 | End: 2021-10-07 | Stop reason: SDUPTHER

## 2021-08-11 RX ORDER — SPIRONOLACTONE 25 MG/1
TABLET ORAL
Qty: 60 TABLET | Refills: 0 | Status: SHIPPED | OUTPATIENT
Start: 2021-08-11 | End: 2021-10-07 | Stop reason: SDUPTHER

## 2021-09-07 ENCOUNTER — OFFICE VISIT (OUTPATIENT)
Dept: FAMILY MEDICINE CLINIC | Age: 76
End: 2021-09-07
Payer: MEDICARE

## 2021-09-07 VITALS
HEART RATE: 85 BPM | TEMPERATURE: 97 F | DIASTOLIC BLOOD PRESSURE: 86 MMHG | BODY MASS INDEX: 29.82 KG/M2 | HEIGHT: 65 IN | OXYGEN SATURATION: 98 % | SYSTOLIC BLOOD PRESSURE: 138 MMHG | WEIGHT: 179 LBS | RESPIRATION RATE: 18 BRPM

## 2021-09-07 DIAGNOSIS — C92.10 CML (CHRONIC MYELOCYTIC LEUKEMIA) (HCC): ICD-10-CM

## 2021-09-07 DIAGNOSIS — E78.2 MIXED HYPERLIPIDEMIA: ICD-10-CM

## 2021-09-07 DIAGNOSIS — I10 ESSENTIAL HYPERTENSION: Primary | ICD-10-CM

## 2021-09-07 PROCEDURE — G8432 DEP SCR NOT DOC, RNG: HCPCS | Performed by: NURSE PRACTITIONER

## 2021-09-07 PROCEDURE — 1101F PT FALLS ASSESS-DOCD LE1/YR: CPT | Performed by: NURSE PRACTITIONER

## 2021-09-07 PROCEDURE — G8536 NO DOC ELDER MAL SCRN: HCPCS | Performed by: NURSE PRACTITIONER

## 2021-09-07 PROCEDURE — 99213 OFFICE O/P EST LOW 20 MIN: CPT | Performed by: NURSE PRACTITIONER

## 2021-09-07 PROCEDURE — G8400 PT W/DXA NO RESULTS DOC: HCPCS | Performed by: NURSE PRACTITIONER

## 2021-09-07 PROCEDURE — G8419 CALC BMI OUT NRM PARAM NOF/U: HCPCS | Performed by: NURSE PRACTITIONER

## 2021-09-07 PROCEDURE — 3017F COLORECTAL CA SCREEN DOC REV: CPT | Performed by: NURSE PRACTITIONER

## 2021-09-07 PROCEDURE — 1090F PRES/ABSN URINE INCON ASSESS: CPT | Performed by: NURSE PRACTITIONER

## 2021-09-07 PROCEDURE — G8427 DOCREV CUR MEDS BY ELIG CLIN: HCPCS | Performed by: NURSE PRACTITIONER

## 2021-09-07 NOTE — PROGRESS NOTES
Johnqasim Manzano presents today for   Chief Complaint   Patient presents with    Follow-up       Is someone accompanying this pt? No    Is the patient using any DME equipment during OV? No    Depression Screening:  3 most recent PHQ Screens 9/7/2021   Little interest or pleasure in doing things Not at all   Feeling down, depressed, irritable, or hopeless Not at all   Total Score PHQ 2 0       Learning Assessment:  Learning Assessment 9/7/2021   PRIMARY LEARNER Patient   HIGHEST LEVEL OF EDUCATION - PRIMARY LEARNER  GRADUATED HIGH SCHOOL OR GED   BARRIERS PRIMARY LEARNER NONE   CO-LEARNER CAREGIVER No   PRIMARY LANGUAGE ENGLISH   LEARNER PREFERENCE PRIMARY LISTENING   ANSWERED BY patient   RELATIONSHIP SELF       Fall Risk  Fall Risk Assessment, last 12 mths 9/7/2021   Able to walk? Yes   Fall in past 12 months? 0   Do you feel unsteady? 0   Are you worried about falling 0       Health Maintenance reviewed and discussed and ordered per Provider. Health Maintenance Due   Topic Date Due    DTaP/Tdap/Td series (1 - Tdap) Never done    Colorectal Cancer Screening Combo  Never done    Shingrix Vaccine Age 50> (1 of 2) Never done    Bone Densitometry (Dexa) Screening  Never done    Flu Vaccine (1) Never done   . Coordination of Care:  1. Have you been to the ER, urgent care clinic since your last visit? Hospitalized since your last visit? NO    2. Have you seen or consulted any other health care providers outside of the [address] [place] [address] since your last visit? Include any pap smears or colon screening.  NO

## 2021-09-12 NOTE — PROGRESS NOTES
Patel Shaffer is a 76 y. o.female presents with   Chief Complaint   Patient presents with    Follow-up     45-year-old female presents today in office for routine follow-up. She has medical history significant for essential hypertension blood pressure well controlled on current medication regimen along with mixed hyperlipidemia and CML. She is currently followed by hematology oncology. Patient verbalizes no complaints of today. Subjective:           Past Medical History:   Diagnosis Date    Aortic valve calcification     Cancer ([address]     Leukemia     Hypercholesterolemia     Hypertension     MVP (mitral valve prolapse)      Past Surgical History:   Procedure Laterality Date    HX CATARACT REMOVAL  2008    HX CATARACT REMOVAL  2013    HX COLONOSCOPY  2008    1 polyp     HX COLONOSCOPY  2013    normal repeat 2018    HX MALIGNANT SKIN LESION EXCISION      HX TUBAL LIGATION       Social History     Socioeconomic History    Marital status:      Spouse name: Not on file    Number of children: Not on file    Years of education: Not on file    Highest education level: Not on file   Tobacco Use    Smoking status: Never Smoker    Smokeless tobacco: Never Used   Vaping Use    Vaping Use: Never used   Substance and Sexual Activity    Alcohol use: Not Currently     Comment: occasional    Drug use: Not Currently     Social Determinants of Health     Financial Resource Strain:     Difficulty of Paying Living Expenses:    Food Insecurity:     Worried About Running Out of Food in the Last Year:     Ran Out of Food in the Last Year:    Transportation Needs:     Lack of Transportation (Medical):      Lack of Transportation (Non-Medical):    Physical Activity:     Days of Exercise per Week:     Minutes of Exercise per Session:    Stress:     Feeling of Stress :    Social Connections:     Frequency of Communication with Friends and Family:     Frequency of Social Gatherings with Friends and Family:     Attends Zoroastrianism Services:     Active Member of Clubs or Organizations:     Attends Club or Organization Meetings:     Marital Status:      Current Outpatient Medications   Medication Sig Dispense Refill    spironolactone (ALDACTONE) 25 mg tablet Take 1 tablet by mouth once daily 60 Tablet 0    losartan (COZAAR) 100 mg tablet Take 1 tablet by mouth once daily 90 Tablet 0    simvastatin (ZOCOR) 20 mg tablet TAKE 1 TABLET BY MOUTH ONCE DAILY -  APPOINTMENT  REQUIRED  FOR  FUTURE  REFILLS 90 Tab 2    folic acid-B complex,C no.17 5 mg tab Take  by mouth.  nilotinib (Tasigna) 150 mg cap capsule Take 300 mg by mouth every twelve (12) hours. Take 2 tabs every 12 hours      cetirizine (ZYRTEC) 10 mg tablet Take 10 mg by mouth daily.  multivitamin-min-iron-FA-vit K (Multi-Day Plus Minerals) 18 mg iron-400 mcg-25 mcg tab Take 1 Tab by mouth daily.  b complex vitamins tablet Take 1 Tab by mouth daily.  vitamin E (AQUA GEMS) 400 unit capsule Take 400 Units by mouth daily.  TURMERIC PO Take  by mouth. Allergies   Allergen Reactions    Pneumovax-23 [Pneumococcal 23-Alicia Ps Vaccine] Swelling     The patient has a family history of    REVIEW OF SYSTEMS  Review of Systems   Respiratory: Negative for cough and shortness of breath. Cardiovascular: Negative for chest pain and palpitations. Psychiatric/Behavioral: Negative for depression. The patient is not nervous/anxious and does not have insomnia.         Objective:     Visit Vitals  /86 (BP 1 Location: Left upper arm, BP Patient Position: Sitting, BP Cuff Size: Adult)   Pulse 85   Temp 97 °F (36.1 °C) (Temporal)   Resp 18   Ht 5' 5\" (1.651 m)   Wt 179 lb (81.2 kg)   SpO2 98%   BMI 29.79 kg/m²       Current Outpatient Medications   Medication Instructions    b complex vitamins tablet 1 Tablet, Oral, DAILY    cetirizine (ZYRTEC) 10 mg, Oral, DAILY    folic acid-B complex,C no.17 5 mg tab Oral    losartan (COZAAR) 100 mg tablet Take 1 tablet by mouth once daily    multivitamin-min-iron-FA-vit K (Multi-Day Plus Minerals) 18 mg iron-400 mcg-25 mcg tab 1 Tablet, Oral, DAILY    simvastatin (ZOCOR) 20 mg tablet TAKE 1 TABLET BY MOUTH ONCE DAILY -  APPOINTMENT  REQUIRED  FOR  FUTURE  REFILLS    spironolactone (ALDACTONE) 25 mg tablet Take 1 tablet by mouth once daily    Tasigna 300 mg, Oral, EVERY 12 HOURS, Take 2 tabs every 12 hours     TURMERIC PO Oral    vitamin E (AQUA GEMS) 400 Units, Oral, DAILY        PHYSICAL EXAM  Physical Exam  Cardiovascular:      Heart sounds: Normal heart sounds. Pulmonary:      Breath sounds: Normal breath sounds. Musculoskeletal:      Right lower leg: No edema. Left lower leg: No edema. Skin:     General: Skin is warm and dry. Neurological:      Mental Status: She is alert and oriented to person, place, and time. Assessment/Plan:     Diagnoses and all orders for this visit:    1. Essential hypertension    2. Mixed hyperlipidemia    3. CML (chronic myelocytic leukemia) ([facility] [address]      The current medical regimen is effective;  continue present plan and medications. I have requested most recent copy of patient's labs which she states were done by hematology oncology any further lab request will be made upon determining what exactly they have checked. Patient verbalized understanding. Follow-up and Dispositions    · Return in about 6 months (around 3/7/2022). Disclaimer:    I have discussed the diagnosis with the patient and the intended plan as seen above. The patient understands our medical plan. The risks, benefits and significant side effects of all medications have been reviewed. Anticipated time course and progression of condition reviewed. All questions have been addressed. She received an after visit summary, with information reviewed, and questions answered.       Where appropriate, she is instructed to call the clinic if she has not been notified either by phone or through [address] with the results of her tests or with an appointment plan for any referrals within 1 week(s). The patient  is to call if her condition worsens or fails to improve or if significant side effects are experienced.        Jozef Mendes, NP

## 2021-09-17 ENCOUNTER — TELEPHONE (OUTPATIENT)
Dept: FAMILY MEDICINE CLINIC | Age: 76
End: 2021-09-17

## 2021-09-17 DIAGNOSIS — Z20.822 ENCOUNTER FOR SCREENING LABORATORY TESTING FOR COVID-19 VIRUS: Primary | ICD-10-CM

## 2021-09-17 NOTE — TELEPHONE ENCOUNTER
Pt was wondering could she get blood work or what not for the covid antibodies/immune system. [phone].

## 2021-09-23 ENCOUNTER — HOSPITAL ENCOUNTER (OUTPATIENT)
Dept: LAB | Age: 76
Discharge: HOME | End: 2021-09-23
Payer: MEDICARE

## 2021-09-23 DIAGNOSIS — N17.9 AKI (ACUTE KIDNEY INJURY) (HCC): ICD-10-CM

## 2021-09-23 DIAGNOSIS — N18.31 STAGE 3A CHRONIC KIDNEY DISEASE (HCC): ICD-10-CM

## 2021-09-23 LAB
ALBUMIN SERPL-MCNC: 4.1 G/DL (ref 3.5–4.7)
ANION GAP SERPL CALC-SCNC: 12 MMOL/L
APPEARANCE UR: CLEAR
BACTERIA URNS QL MICRO: ABNORMAL /HPF
BILIRUB UR QL: NEGATIVE
BUN SERPL-MCNC: 17 MG/DL (ref 9–21)
BUN/CREAT SERPL: 17
CA-I BLD-MCNC: 10 MG/DL (ref 8.5–10.5)
CHLORIDE SERPL-SCNC: 99 MMOL/L (ref 94–111)
CO2 SERPL-SCNC: 24 MMOL/L (ref 21–33)
COLOR UR: ABNORMAL
CREAT SERPL-MCNC: 1 MG/DL (ref 0.7–1.2)
CREAT UR-MCNC: 168.8 MG/DL (ref 22–392)
EPITH CASTS URNS QL MICRO: ABNORMAL /LPF (ref 0–20)
GLUCOSE SERPL-MCNC: 110 MG/DL (ref 70–110)
GLUCOSE UR STRIP.AUTO-MCNC: NEGATIVE MG/DL
HGB UR QL STRIP: NEGATIVE
KETONES UR QL STRIP.AUTO: NEGATIVE MG/DL
LEUKOCYTE ESTERASE UR QL STRIP.AUTO: ABNORMAL
MUCOUS THREADS URNS QL MICRO: ABNORMAL /LPF
NITRITE UR QL STRIP.AUTO: NEGATIVE
PH UR STRIP: 5.5 [PH] (ref 5–8)
PHOSPHATE SERPL-MCNC: 3.1 MG/DL (ref 2.5–4.5)
POTASSIUM SERPL-SCNC: 3.7 MMOL/L (ref 3.2–5.1)
PROT UR STRIP-MCNC: NEGATIVE MG/DL
PROT UR-MCNC: 12 MG/DL (ref 6–10)
PROT/CREAT UR-RTO: 0.1
RBC #/AREA URNS HPF: ABNORMAL /HPF (ref 0–2)
SODIUM SERPL-SCNC: 135 MMOL/L (ref 135–145)
SP GR UR REFRACTOMETRY: 1.02 (ref 1–1.03)
UROBILINOGEN UR QL STRIP.AUTO: 1 EU/DL (ref 0.2–1)
WBC URNS QL MICRO: ABNORMAL /HPF (ref 0–4)

## 2021-09-23 PROCEDURE — 86769 SARS-COV-2 COVID-19 ANTIBODY: CPT

## 2021-09-23 PROCEDURE — 83970 ASSAY OF PARATHORMONE: CPT

## 2021-09-23 PROCEDURE — 80069 RENAL FUNCTION PANEL: CPT

## 2021-09-23 PROCEDURE — 36415 COLL VENOUS BLD VENIPUNCTURE: CPT

## 2021-09-23 PROCEDURE — 81001 URINALYSIS AUTO W/SCOPE: CPT

## 2021-09-23 PROCEDURE — 84156 ASSAY OF PROTEIN URINE: CPT

## 2021-09-24 LAB
CA-I BLD-MCNC: 9.5 MG/DL (ref 8.5–10.1)
PTH-INTACT SERPL-MCNC: 32.2 PG/ML (ref 18.4–88)
SARS-COV-2 ABS, NUCLEOCAPSID: NEGATIVE

## 2021-09-27 NOTE — PROGRESS NOTES
Chief Complaint   Patient presents with    Loss of Consciousness         Past Medical History:   Diagnosis Date    Bronchiolitis 2/2009    Bronchitis 12/2007    Clavicle fracture 09/2007    Flu 1/2008    Sinusitis 2/2009         Review of patient's allergies indicates:   Allergen Reactions    Cefzil [cefprozil] Hives         No current outpatient medications on file prior to visit.     No current facility-administered medications on file prior to visit.          History of present illness/review of systems: Roshan Ramey is a 16 y.o. male who presents to clinic for follow-up of a fainting spell.  He was seen on 10/26 in the ED after fainting and was found to be positive for COVID.  There was no head injury but is back did hit the corner of a display case near the wall when he fainted.  In the ED he had lab work including CBC, CMP, TSH, CPK, urinalysis and drug screening all of which were normal or negative.  Chest x-ray and CT of the head without contrast were also normal and he was discharged after evaluation was complete.  There were no further complications of COVID.  He has recovered and is well.  He has had no previous fainting and no subsequent neurologic symptoms including dizziness, blackouts, weakness, incoordination, headache, nausea, vomiting, paresthesias.  He is not taking any medications.  Past history:  Healthy with pectus deformity and no other chronic or recurring problems.  Immunizations are up-to-date    Physical exam    Vitals:    11/30/20 1652   BP: 112/69   Pulse: 72   Resp: 16   Temp: 98.2 °F (36.8 °C)     Normal vital signs    General: Alert active and cooperative.  No acute distress  Skin: No pallor or rash.  Good turgor and perfusion.  Moist mucous membranes.    HEENT:  Eyes ears nose and throat are clear.  Neck is supple without masses or thyromegaly.  Lymph nodes: No enlarged anterior or posterior cervical lymph nodes.  Chest: No coughing here.  No retractions or stridor.  Normal  Patient contacted and made aware. Patient understood.  chevy Oneill respiratory effort.  Lungs are clear to auscultation.  Cardiovascular: Regular rate and rhythm without murmur or gallop.  Normal S1-S2.  Normal pulses.  No CCE  Abdomen: Soft, nondistended, non tender, normal bowel sounds with no hepatosplenomegaly or mass.    Neurologic: Normal cranial nerves, tone, gait and strength.        History of 2019 novel coronavirus disease (COVID-19) with History of syncope during presentation.  Now recovered with no abnormal metabolic, cardiac or neurologic findings.  Reassurance provided.  No further evaluation is needed unless problems arise.

## 2021-10-07 ENCOUNTER — OFFICE VISIT (OUTPATIENT)
Dept: NEPHROLOGY | Age: 76
End: 2021-10-07
Payer: MEDICARE

## 2021-10-07 VITALS
SYSTOLIC BLOOD PRESSURE: 132 MMHG | WEIGHT: 179 LBS | DIASTOLIC BLOOD PRESSURE: 76 MMHG | HEART RATE: 80 BPM | BODY MASS INDEX: 29.79 KG/M2

## 2021-10-07 DIAGNOSIS — N18.31 STAGE 3A CHRONIC KIDNEY DISEASE (HCC): Primary | ICD-10-CM

## 2021-10-07 DIAGNOSIS — I10 ESSENTIAL HYPERTENSION: Primary | ICD-10-CM

## 2021-10-07 DIAGNOSIS — N17.9 AKI (ACUTE KIDNEY INJURY) (HCC): ICD-10-CM

## 2021-10-07 DIAGNOSIS — I10 ESSENTIAL HYPERTENSION: ICD-10-CM

## 2021-10-07 PROCEDURE — 1101F PT FALLS ASSESS-DOCD LE1/YR: CPT | Performed by: INTERNAL MEDICINE

## 2021-10-07 PROCEDURE — G8400 PT W/DXA NO RESULTS DOC: HCPCS | Performed by: INTERNAL MEDICINE

## 2021-10-07 PROCEDURE — 1090F PRES/ABSN URINE INCON ASSESS: CPT | Performed by: INTERNAL MEDICINE

## 2021-10-07 PROCEDURE — G8427 DOCREV CUR MEDS BY ELIG CLIN: HCPCS | Performed by: INTERNAL MEDICINE

## 2021-10-07 PROCEDURE — 3017F COLORECTAL CA SCREEN DOC REV: CPT | Performed by: INTERNAL MEDICINE

## 2021-10-07 PROCEDURE — G8419 CALC BMI OUT NRM PARAM NOF/U: HCPCS | Performed by: INTERNAL MEDICINE

## 2021-10-07 PROCEDURE — G8510 SCR DEP NEG, NO PLAN REQD: HCPCS | Performed by: INTERNAL MEDICINE

## 2021-10-07 PROCEDURE — 99214 OFFICE O/P EST MOD 30 MIN: CPT | Performed by: INTERNAL MEDICINE

## 2021-10-07 PROCEDURE — G8536 NO DOC ELDER MAL SCRN: HCPCS | Performed by: INTERNAL MEDICINE

## 2021-10-07 RX ORDER — LOSARTAN POTASSIUM 100 MG/1
100 TABLET ORAL DAILY
Qty: 90 TABLET | Refills: 1 | Status: SHIPPED | OUTPATIENT
Start: 2021-10-07 | End: 2021-12-27 | Stop reason: SDUPTHER

## 2021-10-07 RX ORDER — SPIRONOLACTONE 25 MG/1
25 TABLET ORAL DAILY
Qty: 90 TABLET | Refills: 2 | Status: SHIPPED | OUTPATIENT
Start: 2021-10-07 | End: 2022-01-05

## 2021-10-07 RX ORDER — CIPROFLOXACIN 250 MG/1
250 TABLET, FILM COATED ORAL 2 TIMES DAILY
Qty: 6 TABLET | Refills: 0 | Status: SHIPPED | OUTPATIENT
Start: 2021-10-07 | End: 2022-04-19 | Stop reason: ALTCHOICE

## 2021-10-07 NOTE — PROGRESS NOTES
Lucrecia Shanell presents today for   Chief Complaint   Patient presents with    Follow-up     MELISA       Is someone accompanying this pt? no    Is the patient using any DME equipment during OV? no    Depression Screening:  3 most recent PHQ Screens 10/7/2021   Little interest or pleasure in doing things Not at all   Feeling down, depressed, irritable, or hopeless Not at all   Total Score PHQ 2 0       Learning Assessment:  Learning Assessment 9/7/2021   PRIMARY LEARNER Patient   HIGHEST LEVEL OF EDUCATION - PRIMARY LEARNER  GRADUATED HIGH SCHOOL OR GED   BARRIERS PRIMARY LEARNER NONE   CO-LEARNER CAREGIVER No   PRIMARY LANGUAGE ENGLISH   LEARNER PREFERENCE PRIMARY LISTENING   ANSWERED BY patient   RELATIONSHIP SELF       Fall Risk  Fall Risk Assessment, last 12 mths 10/7/2021   Able to walk? Yes   Fall in past 12 months? 0   Do you feel unsteady? 0   Are you worried about falling 0       Coordination of Care:  1. Have you been to the ER, urgent care clinic since your last visit? Hospitalized since your last visit? no    2. Have you seen or consulted any other health care providers outside of the [address] [place] [address] since your last visit? Include any pap smears or colon screening.  Yes, PCP Dahlia Hurley

## 2021-10-07 NOTE — PROGRESS NOTES
Renal Progress Note           Daily Progress Note: 10/7/2021      Subjective: The patient is seen for follow  up of CKD. HPI : Patient is here for a follow-up. Denies any complaints today. Denies any shortness of breath or swelling in the legs. Denies any chest pain. Denies any dysuria  Blood pressure is very well controlled in the office today  Creatinine stable at 1.0  She is not vaccinated for Covid    Medications reviewed  Current Outpatient Medications   Medication Sig    spironolactone (ALDACTONE) 25 mg tablet Take 1 tablet by mouth once daily    losartan (COZAAR) 100 mg tablet Take 1 tablet by mouth once daily    simvastatin (ZOCOR) 20 mg tablet TAKE 1 TABLET BY MOUTH ONCE DAILY -  APPOINTMENT  REQUIRED  FOR  FUTURE  REFILLS    folic acid-B complex,C no.17 5 mg tab Take  by mouth.  nilotinib (Tasigna) 150 mg cap capsule Take 300 mg by mouth every twelve (12) hours. Take 2 tabs every 12 hours    cetirizine (ZYRTEC) 10 mg tablet Take 10 mg by mouth daily.  multivitamin-min-iron-FA-vit K (Multi-Day Plus Minerals) 18 mg iron-400 mcg-25 mcg tab Take 1 Tab by mouth daily.  b complex vitamins tablet Take 1 Tab by mouth daily.  vitamin E (AQUA GEMS) 400 unit capsule Take 400 Units by mouth daily.  TURMERIC PO Take  by mouth. No current facility-administered medications for this visit. Review of Systems:     Constitutional: Negative for chills and fever. HENT: Negative. Eyes: Negative. Respiratory: Negative. Cardiovascular: Negative. Gastrointestinal: Negative for abdominal pain and nausea. Skin: Negative. Neurological: Negative. Objective:   Physical Exam:     Visit Vitals  /76 (BP 1 Location: Left upper arm, BP Patient Position: Sitting)   Pulse 80   Wt 81.2 kg (179 lb)   BMI 29.79 kg/m²           @TMAX(24)@  [unfilled]   [unfilled]    General:  Alert, cooperative, no distress, appears stated age.    Lungs:   Clear to auscultation bilaterally. Chest wall:  No tenderness or deformity. Heart:  Regular rate and rhythm, S1, S2 normal, no murmur, click, rub or gallop. Abdomen:   Soft, non-tender. Bowel sounds normal. No masses,  No organomegaly. Extremities: Extremities normal, atraumatic, no cyanosis or edema. Pulses: 2+ and symmetric all extremities. Skin: Skin color, texture, turgor normal. No rashes or lesions   Neurologic: CNII-XII intact. No gross sensory or motor deficits     Data Review:       PTH INTACT   Collected: 09/23/21 1335  Final result  Specimen: Serum      Calcium 9.5 mg/dL PTH, Intact 32.2 pg/mL          09/24/21 0837  SARS-COV-2 AB, TOTAL   Collected: 09/23/21 1342  Final result  Specimen: Serum    SARS-CoV-2 Abs, Nucleocapsid Negative               09/23/21 1632  PROTEIN/CREATININE RATIO, URINE   Collected: 09/23/21 1335  Final result  Specimen: Urine, random    Protein, urine random 12High  mg/dL Protein/Creat.  urine Ratio 0.1     Creatinine, urine 168.80 mg/dL             09/23/21 1625  RENAL FUNCTION PANEL   Collected: 09/23/21 1335  Final result  Specimen: Serum or Plasma    Sodium 135 mmol/L Creatinine 1.00 mg/dL   Potassium 3.7 mmol/L BUN/Creatinine ratio 17     Chloride 99 mmol/L GFR est AA >60 ml/min/1.73m2   CO2 24 mmol/L GFR est non-AA 54 ml/min/1.73m2    Anion gap 12 mmol/L Calcium 10.0 mg/dL   Glucose 110 mg/dL Phosphorus 3.1 mg/dL   BUN 17 mg/dL Albumin 4.1 g/dL          09/23/21 1616  URINALYSIS W/MICROSCOPIC   Collected: 09/23/21 1335  Final result  Specimen: Urine    Color Dark Yellow    Urobilinogen 1.0 EU/dL   Appearance Clear   Nitrites Negative     Specific gravity 1.016   Leukocyte Esterase LargeAbnormal      pH (UA) 5.5   WBC 10-20 /hpf   Protein Negative mg/dL RBC 5-10 /hpf   Glucose Negative mg/dL Epithelial cells Few /lpf    Ketone Negative mg/dL Bacteria 1+Abnormal  /hpf   Bilirubin Negative   Mucus 1+ /lpf   Blood Negative                    4/5/2021  Sodium 136 potassium 4.3 chloride 98 CO2 25 glucose 88 BUN 17 creatinine 1 calcium 9.5 phosphorus 3.3 albumin 4.3    Labs from 12/7 show sodium 137 potassium 4.1 chloride 101 CO2 23 glucose 99 BUN 10 creatinine 1.1 calcium 9.5 phosphorus 3.1 albumin 4.2    Assessment/Plan:     1. Acute kidney injury chronic kidney disease stage IIIa  -Creatinine is improved from 1.4-->1.0. Creatinine is stable at 1.0. This is likely her baseline  --I will continue losartan and spironolactone at current dose  -She denies any needs at home  -clinically euvolemic  -Urine is bland. Quantify proteinuria     2. Hypertension  -Blood pressure is very well controlled after changes were made at  Prior visit.    -Swelling has gone after discontinuing amlodipine  -Losartan was started instead of lisinopril because of dry cough. She has no dry cough now  -Hydrochlorothiazide was discontinued because of hypokalemia and spironolactone was decreased to 25 mg daily because of low blood pressure readings. Potassium is 3.9 on recent labs. 3.  Bilateral leg edema  -This is resolved after discontinuing non-dihydropyridine calcium channel blockers starting spironolactone     4. Chronic hypokalemia  -Was likely secondary to hydrochlorothiazide which was discontinued at the last visit and spironolactone was started  -Potassium is 3.9 on recent labs. off potassium supplementation. Continue to hold  -Repeat labs prior to next visit     5.   History of CML  -In remission for the past 6 years     I will see her back in the office in 6 months with pre-clinic labs        Leonila Griffiths MD

## 2021-12-27 DIAGNOSIS — I10 ESSENTIAL HYPERTENSION: ICD-10-CM

## 2021-12-27 RX ORDER — LOSARTAN POTASSIUM 50 MG/1
100 TABLET ORAL DAILY
Qty: 180 TABLET | Refills: 0 | Status: SHIPPED | OUTPATIENT
Start: 2021-12-27 | End: 2022-03-29

## 2022-03-29 ENCOUNTER — HOSPITAL ENCOUNTER (OUTPATIENT)
Dept: LAB | Age: 77
Discharge: HOME | End: 2022-03-29
Payer: MEDICARE

## 2022-03-29 DIAGNOSIS — I10 ESSENTIAL HYPERTENSION: ICD-10-CM

## 2022-03-29 DIAGNOSIS — N18.31 STAGE 3A CHRONIC KIDNEY DISEASE (HCC): ICD-10-CM

## 2022-03-29 DIAGNOSIS — N17.9 AKI (ACUTE KIDNEY INJURY) (HCC): ICD-10-CM

## 2022-03-29 LAB
ALBUMIN SERPL-MCNC: 4.1 G/DL (ref 3.5–4.7)
ANION GAP SERPL CALC-SCNC: 9 MMOL/L
APPEARANCE UR: CLEAR
BACTERIA URNS QL MICRO: ABNORMAL /HPF
BILIRUB UR QL: NEGATIVE
BUN SERPL-MCNC: 15 MG/DL (ref 9–21)
BUN/CREAT SERPL: 14
CA-I BLD-MCNC: 9 MG/DL (ref 8.5–10.1)
CA-I BLD-MCNC: 9.3 MG/DL (ref 8.5–10.5)
CHLORIDE SERPL-SCNC: 100 MMOL/L (ref 94–111)
CO2 SERPL-SCNC: 24 MMOL/L (ref 21–33)
COLOR UR: YELLOW
CREAT SERPL-MCNC: 1.1 MG/DL (ref 0.7–1.2)
CREAT UR-MCNC: 56.7 MG/DL (ref 22–392)
EPITH CASTS URNS QL MICRO: ABNORMAL /LPF (ref 0–20)
GLUCOSE SERPL-MCNC: 93 MG/DL (ref 70–110)
GLUCOSE UR STRIP.AUTO-MCNC: NEGATIVE MG/DL
HGB UR QL STRIP: NEGATIVE
KETONES UR QL STRIP.AUTO: NEGATIVE MG/DL
LEUKOCYTE ESTERASE UR QL STRIP.AUTO: ABNORMAL
NITRITE UR QL STRIP.AUTO: NEGATIVE
PH UR STRIP: 5.5 [PH] (ref 5–8)
PHOSPHATE SERPL-MCNC: 3 MG/DL (ref 2.5–4.5)
POTASSIUM SERPL-SCNC: 3.8 MMOL/L (ref 3.2–5.1)
PROT UR STRIP-MCNC: NEGATIVE MG/DL
PROT UR-MCNC: 2 MG/DL (ref 6–10)
PROT/CREAT UR-RTO: 0.1
PTH-INTACT SERPL-MCNC: 55.8 PG/ML (ref 18.4–88)
RBC #/AREA URNS HPF: ABNORMAL /HPF (ref 0–2)
SODIUM SERPL-SCNC: 133 MMOL/L (ref 135–145)
SP GR UR REFRACTOMETRY: 1.01 (ref 1–1.03)
UROBILINOGEN UR QL STRIP.AUTO: 1 EU/DL (ref 0.2–1)
WBC URNS QL MICRO: ABNORMAL /HPF (ref 0–4)

## 2022-03-29 PROCEDURE — 81001 URINALYSIS AUTO W/SCOPE: CPT

## 2022-03-29 PROCEDURE — 36415 COLL VENOUS BLD VENIPUNCTURE: CPT

## 2022-03-29 PROCEDURE — 83970 ASSAY OF PARATHORMONE: CPT

## 2022-03-29 PROCEDURE — 80069 RENAL FUNCTION PANEL: CPT

## 2022-03-29 PROCEDURE — 84156 ASSAY OF PROTEIN URINE: CPT

## 2022-03-29 RX ORDER — LOSARTAN POTASSIUM 50 MG/1
TABLET ORAL
Qty: 180 TABLET | Refills: 0 | Status: SHIPPED | OUTPATIENT
Start: 2022-03-29 | End: 2022-04-14 | Stop reason: DRUGHIGH

## 2022-03-31 DIAGNOSIS — I10 ESSENTIAL HYPERTENSION: ICD-10-CM

## 2022-03-31 RX ORDER — LOSARTAN POTASSIUM 100 MG/1
TABLET ORAL
Qty: 90 TABLET | Refills: 0 | Status: SHIPPED | OUTPATIENT
Start: 2022-03-31 | End: 2022-06-30

## 2022-04-14 ENCOUNTER — OFFICE VISIT (OUTPATIENT)
Dept: NEPHROLOGY | Age: 77
End: 2022-04-14
Payer: MEDICARE

## 2022-04-14 VITALS
DIASTOLIC BLOOD PRESSURE: 73 MMHG | SYSTOLIC BLOOD PRESSURE: 123 MMHG | BODY MASS INDEX: 29.45 KG/M2 | WEIGHT: 177 LBS | HEART RATE: 73 BPM

## 2022-04-14 DIAGNOSIS — N18.31 STAGE 3A CHRONIC KIDNEY DISEASE (HCC): ICD-10-CM

## 2022-04-14 DIAGNOSIS — I10 ESSENTIAL HYPERTENSION: ICD-10-CM

## 2022-04-14 DIAGNOSIS — I10 ESSENTIAL HYPERTENSION: Primary | ICD-10-CM

## 2022-04-14 DIAGNOSIS — N17.9 AKI (ACUTE KIDNEY INJURY) (HCC): ICD-10-CM

## 2022-04-14 PROCEDURE — G8510 SCR DEP NEG, NO PLAN REQD: HCPCS | Performed by: INTERNAL MEDICINE

## 2022-04-14 PROCEDURE — G8752 SYS BP LESS 140: HCPCS | Performed by: INTERNAL MEDICINE

## 2022-04-14 PROCEDURE — G8419 CALC BMI OUT NRM PARAM NOF/U: HCPCS | Performed by: INTERNAL MEDICINE

## 2022-04-14 PROCEDURE — 99214 OFFICE O/P EST MOD 30 MIN: CPT | Performed by: INTERNAL MEDICINE

## 2022-04-14 PROCEDURE — G8400 PT W/DXA NO RESULTS DOC: HCPCS | Performed by: INTERNAL MEDICINE

## 2022-04-14 PROCEDURE — G8754 DIAS BP LESS 90: HCPCS | Performed by: INTERNAL MEDICINE

## 2022-04-14 PROCEDURE — G8536 NO DOC ELDER MAL SCRN: HCPCS | Performed by: INTERNAL MEDICINE

## 2022-04-14 PROCEDURE — G8427 DOCREV CUR MEDS BY ELIG CLIN: HCPCS | Performed by: INTERNAL MEDICINE

## 2022-04-14 RX ORDER — CIPROFLOXACIN 250 MG/1
250 TABLET, FILM COATED ORAL 2 TIMES DAILY
Qty: 6 TABLET | Refills: 0 | Status: SHIPPED | OUTPATIENT
Start: 2022-04-14 | End: 2022-04-19 | Stop reason: ALTCHOICE

## 2022-04-14 NOTE — PROGRESS NOTES
Avelina Pino presents today for   Chief Complaint   Patient presents with    Follow-up     CKD Stage 3       Is someone accompanying this pt? *no    Is the patient using any DME equipment during OV? no    Depression Screening:  3 most recent PHQ Screens 4/14/2022   Little interest or pleasure in doing things Not at all   Feeling down, depressed, irritable, or hopeless Not at all   Total Score PHQ 2 0       Learning Assessment:  Learning Assessment 9/7/2021   PRIMARY LEARNER Patient   HIGHEST LEVEL OF EDUCATION - PRIMARY LEARNER  GRADUATED HIGH SCHOOL OR GED   BARRIERS PRIMARY LEARNER NONE   CO-LEARNER CAREGIVER No   PRIMARY LANGUAGE ENGLISH   LEARNER PREFERENCE PRIMARY LISTENING   ANSWERED BY patient   RELATIONSHIP SELF       Fall Risk  Fall Risk Assessment, last 12 mths 4/14/2022   Able to walk? Yes   Fall in past 12 months? 0   Do you feel unsteady? 0   Are you worried about falling 0       Coordination of Care:  1. Have you been to the ER, urgent care clinic since your last visit? Hospitalized since your last visit? no    2. Have you seen or consulted any other health care providers outside of the [address] [place] [address] since your last visit? Include any pap smears or colon screening.  Yes, PCP Deanna Iqbal

## 2022-04-14 NOTE — PROGRESS NOTES
Renal Progress Note           Daily Progress Note: 4/14/2022      Subjective: The patient is seen for follow  up of CKD. HPI : Patient is here for a follow-up. Denies any complaints today. Denies any shortness of breath or swelling in the legs. Denies any chest pain. Denies any dysuria  Blood pressure is very well controlled in the office today  Creatinine stable at 1.1  She is not vaccinated for Covid    Medications reviewed  Current Outpatient Medications   Medication Sig    losartan (COZAAR) 100 mg tablet Take 1 tablet by mouth once daily    simvastatin (ZOCOR) 20 mg tablet 1 tab po hs    ciprofloxacin HCl (CIPRO) 250 mg tablet Take 1 Tablet by mouth two (2) times a day.  folic acid-B complex,C no.17 5 mg tab Take  by mouth.  nilotinib (Tasigna) 150 mg cap capsule Take 300 mg by mouth every twelve (12) hours. Take 2 tabs every 12 hours    cetirizine (ZYRTEC) 10 mg tablet Take 10 mg by mouth daily.  multivitamin-min-iron-FA-vit K (Multi-Day Plus Minerals) 18 mg iron-400 mcg-25 mcg tab Take 1 Tab by mouth daily.  b complex vitamins tablet Take 1 Tab by mouth daily.  vitamin E (AQUA GEMS) 400 unit capsule Take 400 Units by mouth daily.  TURMERIC PO Take  by mouth.  losartan (COZAAR) 50 mg tablet Take 2 tablets by mouth once daily     No current facility-administered medications for this visit. Review of Systems:     Constitutional: Negative for chills and fever. HENT: Negative. Eyes: Negative. Respiratory: Negative. Cardiovascular: Negative. Gastrointestinal: Negative for abdominal pain and nausea. Skin: Negative. Neurological: Negative. Objective:   Physical Exam:     Visit Vitals  /73 (BP 1 Location: Right arm, BP Patient Position: Sitting)   Pulse 73   Wt 80.3 kg (177 lb)   BMI 29.45 kg/m²               General:  Alert, cooperative, no distress, appears stated age. Lungs:   Clear to auscultation bilaterally. Chest wall:  No tenderness or deformity. Heart:  Regular rate and rhythm, S1, S2 normal, no murmur, click, rub or gallop. Abdomen:   Soft, non-tender. Bowel sounds normal. No masses,  No organomegaly. Extremities: Extremities normal, atraumatic, no cyanosis or edema. Pulses: 2+ and symmetric all extremities. Skin: Skin color, texture, turgor normal. No rashes or lesions   Neurologic: CNII-XII intact. No gross sensory or motor deficits     Data Review:       PTH INTACT   Collected: 09/23/21 1335  Final result  Specimen: Serum      Calcium 9.5 mg/dL PTH, Intact 32.2 pg/mL          09/24/21 0837  SARS-COV-2 AB, TOTAL   Collected: 09/23/21 1342  Final result  Specimen: Serum    SARS-CoV-2 Abs, Nucleocapsid Negative               09/23/21 1632  PROTEIN/CREATININE RATIO, URINE   Collected: 09/23/21 1335  Final result  Specimen: Urine, random    Protein, urine random 12High  mg/dL Protein/Creat.  urine Ratio 0.1     Creatinine, urine 168.80 mg/dL             09/23/21 1625  RENAL FUNCTION PANEL   Collected: 09/23/21 1335  Final result  Specimen: Serum or Plasma    Sodium 135 mmol/L Creatinine 1.00 mg/dL   Potassium 3.7 mmol/L BUN/Creatinine ratio 17     Chloride 99 mmol/L GFR est AA >60 ml/min/1.73m2   CO2 24 mmol/L GFR est non-AA 54 ml/min/1.73m2    Anion gap 12 mmol/L Calcium 10.0 mg/dL   Glucose 110 mg/dL Phosphorus 3.1 mg/dL   BUN 17 mg/dL Albumin 4.1 g/dL          09/23/21 1616  URINALYSIS W/MICROSCOPIC   Collected: 09/23/21 1335  Final result  Specimen: Urine    Color Dark Yellow    Urobilinogen 1.0 EU/dL   Appearance Clear   Nitrites Negative     Specific gravity 1.016   Leukocyte Esterase LargeAbnormal      pH (UA) 5.5   WBC 10-20 /hpf   Protein Negative mg/dL RBC 5-10 /hpf   Glucose Negative mg/dL Epithelial cells Few /lpf    Ketone Negative mg/dL Bacteria 1+Abnormal  /hpf   Bilirubin Negative   Mucus 1+ /lpf   Blood Negative                    4/5/2021  Sodium 136 potassium 4.3 chloride 98 CO2 25 glucose 88 BUN 17 creatinine 1 calcium 9.5 phosphorus 3.3 albumin 4.3    Labs from 12/7 show sodium 137 potassium 4.1 chloride 101 CO2 23 glucose 99 BUN 10 creatinine 1.1 calcium 9.5 phosphorus 3.1 albumin 4.2    Assessment/Plan:     1. Acute kidney injury chronic kidney disease stage IIIa  -Creatinine is improved from 1.4-->1.0. Creatinine is stable at 1.1. This is likely her baseline  --I will continue losartan and spironolactone at current dose  -She denies any needs at home  -clinically euvolemic  -Urine is bland. Quantify proteinuria     2. Hypertension  -Blood pressure is very well controlled after changes were made at  Prior visit.    -Swelling has gone after discontinuing amlodipine  -Losartan was started instead of lisinopril because of dry cough. She has no dry cough now  -Hydrochlorothiazide was discontinued because of hypokalemia and spironolactone was decreased to 25 mg daily because of low blood pressure readings. Potassium is 3.9 on recent labs. 3.  Bilateral leg edema  -This is resolved after discontinuing non-dihydropyridine calcium channel blockers starting spironolactone     4. Chronic hypokalemia  -Was likely secondary to hydrochlorothiazide which was discontinued at the last visit and spironolactone was started  -Potassium is ok on recent labs. off potassium supplementation. Continue to hold  -Repeat labs prior to next visit     5. History of CML  -In remission for the past 6 years    6. UTI  -Urine is suggestive of UTI.   Will call in ciprofloxacin 50 twice daily for 3 days     I will see her back in the office in 6 months with Dinorah Gamino MD

## 2022-04-19 ENCOUNTER — HOSPITAL ENCOUNTER (OUTPATIENT)
Dept: LAB | Age: 77
Discharge: HOME | End: 2022-04-19
Payer: MEDICARE

## 2022-04-19 ENCOUNTER — OFFICE VISIT (OUTPATIENT)
Dept: FAMILY MEDICINE CLINIC | Age: 77
End: 2022-04-19
Payer: MEDICARE

## 2022-04-19 VITALS
BODY MASS INDEX: 29.39 KG/M2 | SYSTOLIC BLOOD PRESSURE: 126 MMHG | TEMPERATURE: 97.4 F | HEIGHT: 65 IN | WEIGHT: 176.4 LBS | RESPIRATION RATE: 18 BRPM | DIASTOLIC BLOOD PRESSURE: 74 MMHG | OXYGEN SATURATION: 98 % | HEART RATE: 84 BPM

## 2022-04-19 DIAGNOSIS — E78.2 MIXED HYPERLIPIDEMIA: Primary | ICD-10-CM

## 2022-04-19 DIAGNOSIS — C92.10 CML (CHRONIC MYELOCYTIC LEUKEMIA) (HCC): ICD-10-CM

## 2022-04-19 LAB
CHOLEST SERPL-MCNC: 191 MG/DL
HDLC SERPL-MCNC: 82 MG/DL (ref 40–60)
HDLC SERPL: 2.3 {RATIO} (ref 0–5)
LDLC SERPL CALC-MCNC: 94.2 MG/DL (ref 0–100)
LIPID PROFILE,FLP: ABNORMAL
TRIGL SERPL-MCNC: 74 MG/DL (ref ?–150)
VLDLC SERPL CALC-MCNC: 14.8 MG/DL

## 2022-04-19 PROCEDURE — G8400 PT W/DXA NO RESULTS DOC: HCPCS | Performed by: NURSE PRACTITIONER

## 2022-04-19 PROCEDURE — 36415 COLL VENOUS BLD VENIPUNCTURE: CPT

## 2022-04-19 PROCEDURE — G8536 NO DOC ELDER MAL SCRN: HCPCS | Performed by: NURSE PRACTITIONER

## 2022-04-19 PROCEDURE — G8432 DEP SCR NOT DOC, RNG: HCPCS | Performed by: NURSE PRACTITIONER

## 2022-04-19 PROCEDURE — 1090F PRES/ABSN URINE INCON ASSESS: CPT | Performed by: NURSE PRACTITIONER

## 2022-04-19 PROCEDURE — 99213 OFFICE O/P EST LOW 20 MIN: CPT | Performed by: NURSE PRACTITIONER

## 2022-04-19 PROCEDURE — G8427 DOCREV CUR MEDS BY ELIG CLIN: HCPCS | Performed by: NURSE PRACTITIONER

## 2022-04-19 PROCEDURE — G8419 CALC BMI OUT NRM PARAM NOF/U: HCPCS | Performed by: NURSE PRACTITIONER

## 2022-04-19 PROCEDURE — G8752 SYS BP LESS 140: HCPCS | Performed by: NURSE PRACTITIONER

## 2022-04-19 PROCEDURE — 80061 LIPID PANEL: CPT

## 2022-04-19 PROCEDURE — 1101F PT FALLS ASSESS-DOCD LE1/YR: CPT | Performed by: NURSE PRACTITIONER

## 2022-04-19 PROCEDURE — G8754 DIAS BP LESS 90: HCPCS | Performed by: NURSE PRACTITIONER

## 2022-04-19 RX ORDER — SPIRONOLACTONE 25 MG/1
TABLET ORAL
COMMUNITY
Start: 2022-03-29 | End: 2022-06-30

## 2022-04-19 NOTE — PROGRESS NOTES
Anju Johnson presents today for   Chief Complaint   Patient presents with    Follow-up       Is someone accompanying this pt? NO    Is the patient using any DME equipment during OV? No    Depression Screening:  3 most recent PHQ Screens 4/19/2022   Little interest or pleasure in doing things Not at all   Feeling down, depressed, irritable, or hopeless Not at all   Total Score PHQ 2 0       Learning Assessment:  Learning Assessment 9/7/2021   PRIMARY LEARNER Patient   HIGHEST LEVEL OF EDUCATION - PRIMARY LEARNER  GRADUATED HIGH SCHOOL OR GED   BARRIERS PRIMARY LEARNER NONE   CO-LEARNER CAREGIVER No   PRIMARY LANGUAGE ENGLISH   LEARNER PREFERENCE PRIMARY LISTENING   ANSWERED BY patient   RELATIONSHIP SELF       Fall Risk  Fall Risk Assessment, last 12 mths 4/19/2022   Able to walk? Yes   Fall in past 12 months? 0   Do you feel unsteady? 0   Are you worried about falling 0       Health Maintenance reviewed and discussed and ordered per Provider. Health Maintenance Due   Topic Date Due    COVID-19 Vaccine (1) Never done    DTaP/Tdap/Td series (1 - Tdap) Never done    Shingrix Vaccine Age 50> (1 of 2) Never done    Bone Densitometry (Dexa) Screening  Never done    Lipid Screen  01/28/2022    Medicare Yearly Exam  01/29/2022   . Coordination of Care:    1. Have you been to the ER, urgent care clinic since your last visit? Hospitalized since your last visit? No    2. Have you seen or consulted any other health care providers outside of the [address] [place] [address] since your last visit? Include any pap smears or colon screening. No    3. For patients aged 39-70: Has the patient had a colonoscopy / FIT/ Cologuard? NA - based on age      If the patient is female:    4. For patients aged 41-77: Has the patient had a mammogram within the past 2 years? NA - based on age or sex      11. For patients aged 21-65: Has the patient had a pap smear?  NA - based on age or sex

## 2022-04-27 NOTE — PROGRESS NOTES
Silvia Roper is a 68 y. o.female presents with   Chief Complaint   Patient presents with    Follow-up       51-year-old female presents today in office for follow-up related to mixed hyperlipidemia. She also has hypertension blood pressure well controlled on current medication regimen as well as CML followed by oncology and chronic kidney disease followed by nephrology. She verbalizes no complaints of today. Subjective:           Past Medical History:   Diagnosis Date    Aortic valve calcification     CAD (coronary artery disease) Need heart valve repleace    Cancer ([address]     Leukemia     Hypercholesterolemia     Hypertension     MVP (mitral valve prolapse)      Past Surgical History:   Procedure Laterality Date    HX CATARACT REMOVAL  2008    HX CATARACT REMOVAL  2013    HX COLONOSCOPY  2008    1 polyp     HX COLONOSCOPY  2013    normal repeat 2018    HX MALIGNANT SKIN LESION EXCISION      HX TUBAL LIGATION       Social History     Socioeconomic History    Marital status:    Tobacco Use    Smoking status: Never Smoker    Smokeless tobacco: Never Used   Vaping Use    Vaping Use: Never used   Substance and Sexual Activity    Alcohol use: Not Currently     Alcohol/week: 0.0 standard drinks     Comment: occasional    Drug use: Not Currently    Sexual activity: Never     Current Outpatient Medications   Medication Sig Dispense Refill    spironolactone (ALDACTONE) 25 mg tablet TAKE 1 TABLET BY MOUTH ONCE DAILY FOR 90 DAYS      losartan (COZAAR) 100 mg tablet Take 1 tablet by mouth once daily 90 Tablet 0    simvastatin (ZOCOR) 20 mg tablet 1 tab po hs 90 Tablet 1    folic acid-B complex,C no.17 5 mg tab Take  by mouth.  nilotinib (Tasigna) 150 mg cap capsule Take 300 mg by mouth every twelve (12) hours. Take 2 tabs every 12 hours      cetirizine (ZYRTEC) 10 mg tablet Take 10 mg by mouth daily.       multivitamin-min-iron-FA-vit K (Multi-Day Plus Minerals) 18 mg iron-400 mcg-25 mcg tab Take 1 Tab by mouth daily.  b complex vitamins tablet Take 1 Tab by mouth daily.  vitamin E (AQUA GEMS) 400 unit capsule Take 400 Units by mouth daily.  TURMERIC PO Take  by mouth. Allergies   Allergen Reactions    Pneumovax-23 [Pneumococcal 23-Alicia Ps Vaccine] Swelling     The patient has a family history of    REVIEW OF SYSTEMS  Review of Systems   Respiratory: Negative for cough and shortness of breath. Cardiovascular: Negative for chest pain and palpitations. Psychiatric/Behavioral: Negative for depression. The patient is not nervous/anxious and does not have insomnia. Objective:     Visit Vitals  /74 (BP 1 Location: Left upper arm, BP Patient Position: Sitting, BP Cuff Size: Large adult)   Pulse 84   Temp 97.4 °F (36.3 °C) (Temporal)   Resp 18   Ht 5' 5\" (1.651 m)   Wt 176 lb 6.4 oz (80 kg)   SpO2 98%   BMI 29.35 kg/m²       Current Outpatient Medications   Medication Instructions    b complex vitamins tablet 1 Tablet, Oral, DAILY    cetirizine (ZYRTEC) 10 mg, Oral, DAILY    folic acid-B complex,C no.17 5 mg tab Oral    losartan (COZAAR) 100 mg tablet Take 1 tablet by mouth once daily    multivitamin-min-iron-FA-vit K (Multi-Day Plus Minerals) 18 mg iron-400 mcg-25 mcg tab 1 Tablet, Oral, DAILY    simvastatin (ZOCOR) 20 mg tablet 1 tab po hs    spironolactone (ALDACTONE) 25 mg tablet TAKE 1 TABLET BY MOUTH ONCE DAILY FOR 90 DAYS    Tasigna 300 mg, Oral, EVERY 12 HOURS, Take 2 tabs every 12 hours     TURMERIC PO Oral    vitamin E (AQUA GEMS) 400 Units, Oral, DAILY        PHYSICAL EXAM  Physical Exam  Cardiovascular:      Heart sounds: Normal heart sounds. Pulmonary:      Breath sounds: Normal breath sounds. Musculoskeletal:      Right lower leg: No edema. Left lower leg: No edema. Skin:     General: Skin is warm and dry. Neurological:      Mental Status: She is alert and oriented to person, place, and time.          Assessment/Plan: Diagnoses and all orders for this visit:    1. Mixed hyperlipidemia  -     LIPID PANEL    2. CML (chronic myelocytic leukemia) ([facility] [address]    I am reassessing her lipid panel today she is to keep all follow-ups with nephrology as well as oncology. Follow-up and Dispositions    · Return in about 1 year (around 4/19/2023). Disclaimer:    I have discussed the diagnosis with the patient and the intended plan as seen above. The patient understands our medical plan. The risks, benefits and significant side effects of all medications have been reviewed. Anticipated time course and progression of condition reviewed. All questions have been addressed. She received an after visit summary, with information reviewed, and questions answered. Where appropriate, she is instructed to call the clinic if she has not been notified either by phone or through [address] with the results of her tests or with an appointment plan for any referrals within 1 week(s). The patient  is to call if her condition worsens or fails to improve or if significant side effects are experienced.        Enrrique Barnes NP

## 2022-06-30 DIAGNOSIS — I10 ESSENTIAL HYPERTENSION: ICD-10-CM

## 2022-06-30 RX ORDER — SPIRONOLACTONE 25 MG/1
TABLET ORAL
Qty: 90 TABLET | Refills: 0 | Status: SHIPPED | OUTPATIENT
Start: 2022-06-30 | End: 2022-10-10

## 2022-06-30 RX ORDER — LOSARTAN POTASSIUM 100 MG/1
TABLET ORAL
Qty: 90 TABLET | Refills: 0 | Status: SHIPPED | OUTPATIENT
Start: 2022-06-30 | End: 2022-07-13

## 2022-07-13 ENCOUNTER — HOSPITAL ENCOUNTER (OUTPATIENT)
Dept: PREADMISSION TESTING | Age: 77
Discharge: HOME | End: 2022-07-13
Payer: MEDICARE

## 2022-07-13 VITALS
SYSTOLIC BLOOD PRESSURE: 150 MMHG | TEMPERATURE: 98.2 F | DIASTOLIC BLOOD PRESSURE: 89 MMHG | WEIGHT: 175.8 LBS | HEART RATE: 69 BPM | RESPIRATION RATE: 18 BRPM | OXYGEN SATURATION: 99 % | BODY MASS INDEX: 29.25 KG/M2

## 2022-07-13 LAB
ALBUMIN SERPL-MCNC: 4 G/DL (ref 3.5–5)
ALBUMIN/GLOB SERPL: 1.3 {RATIO} (ref 1.1–2.2)
ALP SERPL-CCNC: 88 U/L (ref 45–117)
ALT SERPL-CCNC: 34 U/L (ref 12–78)
ANION GAP SERPL CALC-SCNC: 6 MMOL/L (ref 5–15)
APTT PPP: 30.9 SEC (ref 21.2–34.1)
AST SERPL W P-5'-P-CCNC: 31 U/L (ref 15–37)
ATRIAL RATE: 60 BPM
BASOPHILS # BLD: 0.1 K/UL (ref 0–0.1)
BASOPHILS NFR BLD: 1 % (ref 0–1)
BILIRUB SERPL-MCNC: 1 MG/DL (ref 0.2–1)
BUN SERPL-MCNC: 11 MG/DL (ref 6–20)
BUN/CREAT SERPL: 11 (ref 12–20)
CA-I BLD-MCNC: 9.9 MG/DL (ref 8.5–10.1)
CALCULATED P AXIS, ECG09: 57 DEGREES
CALCULATED R AXIS, ECG10: -8 DEGREES
CALCULATED T AXIS, ECG11: 34 DEGREES
CHLORIDE SERPL-SCNC: 105 MMOL/L (ref 97–108)
CO2 SERPL-SCNC: 28 MMOL/L (ref 21–32)
CREAT SERPL-MCNC: 0.97 MG/DL (ref 0.55–1.02)
DIAGNOSIS, 93000: NORMAL
DIFFERENTIAL METHOD BLD: NORMAL
EOSINOPHIL # BLD: 0 K/UL (ref 0–0.4)
EOSINOPHIL NFR BLD: 1 % (ref 0–7)
ERYTHROCYTE [DISTWIDTH] IN BLOOD BY AUTOMATED COUNT: 11.8 % (ref 11.5–14.5)
GLOBULIN SER CALC-MCNC: 3.2 G/DL (ref 2–4)
GLUCOSE SERPL-MCNC: 104 MG/DL (ref 65–100)
HCT VFR BLD AUTO: 38.7 % (ref 35–47)
HGB BLD-MCNC: 13.2 G/DL (ref 11.5–16)
IMM GRANULOCYTES # BLD AUTO: 0 K/UL (ref 0–0.04)
IMM GRANULOCYTES NFR BLD AUTO: 0 % (ref 0–0.5)
INR PPP: 1.1 (ref 0.9–1.1)
LYMPHOCYTES # BLD: 1.3 K/UL (ref 0.8–3.5)
LYMPHOCYTES NFR BLD: 20 % (ref 12–49)
MCH RBC QN AUTO: 29.4 PG (ref 26–34)
MCHC RBC AUTO-ENTMCNC: 34.1 G/DL (ref 30–36.5)
MCV RBC AUTO: 86.2 FL (ref 80–99)
MONOCYTES # BLD: 0.6 K/UL (ref 0–1)
MONOCYTES NFR BLD: 10 % (ref 5–13)
NEUTS SEG # BLD: 4.7 K/UL (ref 1.8–8)
NEUTS SEG NFR BLD: 68 % (ref 32–75)
NRBC # BLD: 0 K/UL (ref 0–0.01)
NRBC BLD-RTO: 0 PER 100 WBC
P-R INTERVAL, ECG05: 180 MS
PLATELET # BLD AUTO: 322 K/UL (ref 150–400)
PMV BLD AUTO: 9.1 FL (ref 8.9–12.9)
POTASSIUM SERPL-SCNC: 4.2 MMOL/L (ref 3.5–5.1)
PROT SERPL-MCNC: 7.2 G/DL (ref 6.4–8.2)
PROTHROMBIN TIME: 13.9 SEC (ref 11.9–14.6)
Q-T INTERVAL, ECG07: 380 MS
QRS DURATION, ECG06: 74 MS
QTC CALCULATION (BEZET), ECG08: 380 MS
RBC # BLD AUTO: 4.49 M/UL (ref 3.8–5.2)
SODIUM SERPL-SCNC: 139 MMOL/L (ref 136–145)
THERAPEUTIC RANGE,PTTT: NORMAL SEC (ref 82–109)
VENTRICULAR RATE, ECG03: 60 BPM
WBC # BLD AUTO: 6.7 K/UL (ref 3.6–11)

## 2022-07-13 PROCEDURE — 85730 THROMBOPLASTIN TIME PARTIAL: CPT

## 2022-07-13 PROCEDURE — 85025 COMPLETE CBC W/AUTO DIFF WBC: CPT

## 2022-07-13 PROCEDURE — 80053 COMPREHEN METABOLIC PANEL: CPT

## 2022-07-13 PROCEDURE — 93005 ELECTROCARDIOGRAM TRACING: CPT

## 2022-07-13 PROCEDURE — 36415 COLL VENOUS BLD VENIPUNCTURE: CPT

## 2022-07-13 PROCEDURE — 85610 PROTHROMBIN TIME: CPT

## 2022-07-13 RX ORDER — GLUCOSAMINE SULFATE 1500 MG
1000 POWDER IN PACKET (EA) ORAL DAILY
COMMUNITY

## 2022-07-15 ENCOUNTER — HOSPITAL ENCOUNTER (OUTPATIENT)
Age: 77
Discharge: HOME | End: 2022-07-15
Attending: STUDENT IN AN ORGANIZED HEALTH CARE EDUCATION/TRAINING PROGRAM | Admitting: STUDENT IN AN ORGANIZED HEALTH CARE EDUCATION/TRAINING PROGRAM
Payer: MEDICARE

## 2022-07-15 VITALS
HEART RATE: 62 BPM | SYSTOLIC BLOOD PRESSURE: 131 MMHG | DIASTOLIC BLOOD PRESSURE: 70 MMHG | OXYGEN SATURATION: 96 % | HEIGHT: 65 IN | BODY MASS INDEX: 29.25 KG/M2 | RESPIRATION RATE: 16 BRPM | TEMPERATURE: 97.6 F

## 2022-07-15 DIAGNOSIS — R06.02 SOB (SHORTNESS OF BREATH): ICD-10-CM

## 2022-07-15 PROBLEM — R00.2 PALPITATIONS: Status: ACTIVE | Noted: 2022-07-15

## 2022-07-15 PROCEDURE — 76210000021 HC REC RM PH II 0.5 TO 1 HR: Performed by: STUDENT IN AN ORGANIZED HEALTH CARE EDUCATION/TRAINING PROGRAM

## 2022-07-15 PROCEDURE — 77030040934 HC CATH DIAG DXTERITY MEDT -A: Performed by: STUDENT IN AN ORGANIZED HEALTH CARE EDUCATION/TRAINING PROGRAM

## 2022-07-15 PROCEDURE — 77030042317 HC BND COMPR HEMSTAT -B: Performed by: STUDENT IN AN ORGANIZED HEALTH CARE EDUCATION/TRAINING PROGRAM

## 2022-07-15 PROCEDURE — 74011000250 HC RX REV CODE- 250: Performed by: STUDENT IN AN ORGANIZED HEALTH CARE EDUCATION/TRAINING PROGRAM

## 2022-07-15 PROCEDURE — 74011000636 HC RX REV CODE- 636: Performed by: STUDENT IN AN ORGANIZED HEALTH CARE EDUCATION/TRAINING PROGRAM

## 2022-07-15 PROCEDURE — 99153 MOD SED SAME PHYS/QHP EA: CPT | Performed by: STUDENT IN AN ORGANIZED HEALTH CARE EDUCATION/TRAINING PROGRAM

## 2022-07-15 PROCEDURE — C1769 GUIDE WIRE: HCPCS | Performed by: STUDENT IN AN ORGANIZED HEALTH CARE EDUCATION/TRAINING PROGRAM

## 2022-07-15 PROCEDURE — 99152 MOD SED SAME PHYS/QHP 5/>YRS: CPT | Performed by: STUDENT IN AN ORGANIZED HEALTH CARE EDUCATION/TRAINING PROGRAM

## 2022-07-15 PROCEDURE — 93454 CORONARY ARTERY ANGIO S&I: CPT | Performed by: STUDENT IN AN ORGANIZED HEALTH CARE EDUCATION/TRAINING PROGRAM

## 2022-07-15 PROCEDURE — 77030019698 HC SYR ANGI MDLON MRTM -A: Performed by: STUDENT IN AN ORGANIZED HEALTH CARE EDUCATION/TRAINING PROGRAM

## 2022-07-15 PROCEDURE — 77030016699 HC CATH ANGI DX INFN1 CARD -A: Performed by: STUDENT IN AN ORGANIZED HEALTH CARE EDUCATION/TRAINING PROGRAM

## 2022-07-15 PROCEDURE — C1894 INTRO/SHEATH, NON-LASER: HCPCS | Performed by: STUDENT IN AN ORGANIZED HEALTH CARE EDUCATION/TRAINING PROGRAM

## 2022-07-15 PROCEDURE — 76210000001 HC OR PH I REC 2.5 TO 3 HR: Performed by: STUDENT IN AN ORGANIZED HEALTH CARE EDUCATION/TRAINING PROGRAM

## 2022-07-15 PROCEDURE — 74011250636 HC RX REV CODE- 250/636: Performed by: STUDENT IN AN ORGANIZED HEALTH CARE EDUCATION/TRAINING PROGRAM

## 2022-07-15 RX ORDER — VERAPAMIL HYDROCHLORIDE 2.5 MG/ML
INJECTION, SOLUTION INTRAVENOUS AS NEEDED
Status: DISCONTINUED | OUTPATIENT
Start: 2022-07-15 | End: 2022-07-15 | Stop reason: HOSPADM

## 2022-07-15 RX ORDER — SODIUM CHLORIDE 9 MG/ML
50 INJECTION, SOLUTION INTRAVENOUS CONTINUOUS
Status: DISCONTINUED | OUTPATIENT
Start: 2022-07-15 | End: 2022-07-15 | Stop reason: HOSPADM

## 2022-07-15 RX ORDER — LIDOCAINE HYDROCHLORIDE 10 MG/ML
INJECTION INFILTRATION; PERINEURAL AS NEEDED
Status: DISCONTINUED | OUTPATIENT
Start: 2022-07-15 | End: 2022-07-15 | Stop reason: HOSPADM

## 2022-07-15 RX ORDER — MIDAZOLAM HYDROCHLORIDE 1 MG/ML
INJECTION INTRAMUSCULAR; INTRAVENOUS AS NEEDED
Status: DISCONTINUED | OUTPATIENT
Start: 2022-07-15 | End: 2022-07-15 | Stop reason: HOSPADM

## 2022-07-15 RX ORDER — FENTANYL CITRATE 50 UG/ML
INJECTION, SOLUTION INTRAMUSCULAR; INTRAVENOUS AS NEEDED
Status: DISCONTINUED | OUTPATIENT
Start: 2022-07-15 | End: 2022-07-15 | Stop reason: HOSPADM

## 2022-07-15 RX ORDER — SODIUM CHLORIDE 0.9 % (FLUSH) 0.9 %
5-40 SYRINGE (ML) INJECTION AS NEEDED
Status: DISCONTINUED | OUTPATIENT
Start: 2022-07-15 | End: 2022-07-15 | Stop reason: HOSPADM

## 2022-07-15 RX ORDER — SODIUM CHLORIDE 0.9 % (FLUSH) 0.9 %
5-40 SYRINGE (ML) INJECTION EVERY 8 HOURS
Status: DISCONTINUED | OUTPATIENT
Start: 2022-07-15 | End: 2022-07-15 | Stop reason: HOSPADM

## 2022-07-15 RX ORDER — NITROGLYCERIN 5 MG/ML
INJECTION, SOLUTION INTRAVENOUS AS NEEDED
Status: DISCONTINUED | OUTPATIENT
Start: 2022-07-15 | End: 2022-07-15 | Stop reason: HOSPADM

## 2022-07-15 RX ORDER — HEPARIN SODIUM 1000 [USP'U]/ML
INJECTION, SOLUTION INTRAVENOUS; SUBCUTANEOUS AS NEEDED
Status: DISCONTINUED | OUTPATIENT
Start: 2022-07-15 | End: 2022-07-15 | Stop reason: HOSPADM

## 2022-07-15 RX ADMIN — SODIUM CHLORIDE 50 ML/HR: 9 INJECTION, SOLUTION INTRAVENOUS at 08:54

## 2022-07-15 NOTE — Clinical Note
TRANSFER - OUT REPORT:     Verbal report given to: Megan Nix, (at bedside). Report consisted of patient's Situation, Background, Assessment and   Recommendations(SBAR). Opportunity for questions and clarification was provided. Patient transported with a Registered Nurse.

## 2022-07-15 NOTE — PERIOP NOTES
Attempted to contact Brooke Plata (Son) @ [phone]. Call was sent straight to voicemail. No voicemail was left. Will attempt to call again shortly.

## 2022-07-15 NOTE — Clinical Note
Contrast Dose Calculator:   Patient's age: 68.   Patient's sex: Female. Patient weight (kg) = 79.7. Creatinine level (mg/dL) = 0.97. Creatinine clearance (mL/min): 62.08. Contrast concentration (mg/mL) = 370. MACD = 300 mL. Max Contrast dose per Creatinine Cl calculator = 139.68 mL.

## 2022-07-15 NOTE — DISCHARGE INSTRUCTIONS
Patient Education   Patient Education                    Cardiac Catheterization/Angiography Discharge Instructions    *Check the puncture site frequently for swelling or bleeding. If you see any bleeding, lie down and apply pressure over the area with a clean town or washcloth. Notify your doctor for any redness, swelling, drainage or oozing from the puncture site. Notify your doctor for any fever or chills. *If the leg or arm with the puncture becomes cold, numb or painful, call Dr Lian Tovar at  [facility]    *Activity should be limited for the next 48 hours. Climb stairs as little as possible and avoid any stooping, bending or strenuous activity for 48 hours. No heavy lifting (anything over 10 pounds) for three days. *Do not drive for 48 hours. *You may resume your usual diet. Drink more fluids than usual.    *Have a responsible person drive you home and stay with you for at least 24 hours after your heart catheterization/angiography. *You may remove the bandage from your {ARM/GROIN:07999} in 24 hours. You may shower in 24 hours. No tub baths, hot tubs or swimming for one week. Do not place any lotions, creams, powders, ointments over the puncture site for one week. You may place a clean band-aid over the puncture site each day for 5 days. Change this daily. Angiogram: What to Expect at Home  Your Recovery  An angiogram is an X-ray test that uses dye and a camera to take pictures of the blood flow in an artery or a vein. The doctor inserted a thin, flexible tube (catheter) into a blood vessel in your groin. In some cases, the catheter is placed in a blood vessel in the arm. An angiogram is done for many reasons. For example, you may have an angiogram to find the source of bleeding, such as an ulcer. Or it may be done to look for blocked blood vessels in your lungs. After an angiogram, your groin or arm may have a bruise and feel sore for a day or two.  You can do light activities around the house but nothing strenuous for several days. Your doctor may give you specific instructions on when you can do your normal activities again, such as driving and going back to work. This care sheet gives you a general idea about how long it will take for you to recover. But each person recovers at a different pace. Follow the steps below to feel better as quickly as possible. How can you care for yourself at home? Activity    · Do not do strenuous exercise and do not lift, pull, or push anything heavy until your doctor says it is okay. This may be for a day or two. You can walk around the house and do light activity, such as cooking.     · If the catheter was placed in your groin, try not to walk up stairs for the first couple of days.     · If the catheter was placed in your arm near your wrist, do not bend your wrist deeply for the first couple of days. Be careful using your hand to get into and out of a chair or bed.     · If your doctor recommends it, get more exercise. Walking is a good choice. Bit by bit, increase the amount you walk every day. Try for at least 30 minutes on most days of the week. Diet    · Drink plenty of fluids to help your body flush out the dye. If you have kidney, heart, or liver disease and have to limit fluids, talk with your doctor before you increase the amount of fluids you drink.     · You can eat your normal diet. If your stomach is upset, try bland, low-fat foods like plain rice, broiled chicken, toast, and yogurt. Medicines    · Be safe with medicines. Read and follow all instructions on the label. ? If the doctor gave you a prescription medicine for pain, take it as prescribed. ? If you are not taking a prescription pain medicine, ask your doctor if you can take an over-the-counter medicine.     · If you take aspirin or some other blood thinner, ask your doctor if and when to start taking it again.  Make sure that you understand exactly what your doctor wants you to do.     · Your doctor will tell you if and when you can restart your medicines. He or she will also give you instructions about taking any new medicines. Care of the catheter site    · You will have a dressing over the cut (incision). A dressing helps the incision heal and protects it. Your doctor will tell you how to take care of this.     · Put ice or a cold pack on the area for 10 to 20 minutes at a time to help with soreness or swelling. Put a thin cloth between the ice and your skin.     · You may shower 24 to 48 hours after the procedure, if your doctor okays it. Pat the incision dry.     · Do not soak the catheter site until it is healed. Don't take a bath for 1 week, or until your doctor tells you it is okay.     · Watch for bleeding from the site. A small amount of blood (up to the size of a quarter) on the bandage can be normal.     · If you are bleeding, lie down and press on the area for 15 minutes to try to make it stop. If the bleeding does not stop, call your doctor or seek immediate medical care. Follow-up care is a key part of your treatment and safety. Be sure to make and go to all appointments, and call your doctor if you are having problems. It's also a good idea to know your test results and keep a list of the medicines you take. When should you call for help? Call 911 anytime you think you may need emergency care. For example, call if:    · You passed out (lost consciousness).     · You have severe trouble breathing.     · You have sudden chest pain and shortness of breath, or you cough up blood. Call your doctor now or seek immediate medical care if:    · You are bleeding from the area where the catheter was put in your artery.     · You have a fast-growing, painful lump at the catheter site.     · You have signs of infection, such as:  ? Increased pain, swelling, warmth, or redness. ? Red streaks leading from the incision. ? Pus draining from the incision. ? A fever.    Watch closely for any changes in your health, and be sure to contact your doctor if:    · You don't get better as expected. Where can you learn more? Go to http://www.gray.com/  Enter [id number] in the search box to learn more about \"Angiogram: What to Expect at Home. \"  Current as of: June 17, 2021               Content Version: 13.2  © 2006-2022 [employer]. Care instructions adapted under license by [employer] (which disclaims liability or warranty for this information). If you have questions about a medical condition or this instruction, always ask your healthcare professional. [address] any warranty or liability for your use of this information. Left Heart Catheterization: About This Test  What is it? Left heart catheterization is a test to check the left side of your heart. Your doctor might check the structure of your heart, the motion of your heart, or the blood pressure inside the chambers. Why is this test done? This test gives information about how your heart is working. It can:  · Check blood flow and blood pressure in the chambers of the heart. · Check the pumping action of the heart. · Find out if a heart defect is present and how severe it is. · Find out how well the heart valves work. How is the test done? · You may get medicine to help you relax. · You will get a shot to numb the skin where the catheter goes in. · A thin tube called a catheter is put into a blood vessel in your groin or wrist. The doctor moves the catheter through the blood vessel into your heart. · Dye may be injected into your heart. Your doctor can watch on special monitors as the dye moves in your heart. The dye helps your doctor see blood flow in your heart.   · If a heart defect is found, cardiac catheterization sometimes is used to repair it during the test.  · After the procedure, pressure may be applied for a short time to the area where the catheter was put into your blood vessel. This will help prevent bleeding. A small device may also be used to close the blood vessel. You may have a bandage or compression device on the catheter site. · You will stay in a room for at least a few hours to make sure the catheter site starts to heal.  · If the catheter was placed in your groin, you may lie in bed for up to a few hours. If the catheter was put in your wrist, you will need to keep your arm still for at least 1 hour. How long does it take? The test may take about 1 hour. But you need time to get ready for the test and time to recover. This can take a few hours. What happens after the test?  · You may or may not need to stay in the hospital overnight. You will get more instructions for what to do at home. · Drink plenty of fluids for several hours after the test. If you have kidney, heart, or liver disease and have to limit fluids, talk with your doctor before you increase the amount of fluids you drink. Follow-up care is a key part of your treatment and safety. Be sure to make and go to all appointments, and call your doctor if you are having problems. It's also a good idea to know your test results and keep a list of the medicines you take. Where can you learn more? Go to http://www.gray.com/  Enter W306 in the search box to learn more about \"Left Heart Catheterization: About This Test.\"  Current as of: January 10, 2022               Content Version: 13.2  © 2006-2022 [employer], [facility]. Care instructions adapted under license by [facility] (which disclaims liability or warranty for this information). If you have questions about a medical condition or this instruction, always ask your healthcare professional. [address] any warranty or liability for your use of this information.

## 2022-08-17 ENCOUNTER — TELEPHONE (OUTPATIENT)
Dept: FAMILY MEDICINE CLINIC | Age: 77
End: 2022-08-17

## 2022-08-17 NOTE — TELEPHONE ENCOUNTER
Patient called regarding spraining her ankle last Friday and she is still having pain and swelling. She asked for an order for an xray so that she could avoid having to go to the ER. I advised her that [facility] does not put in orders without a visit and due to the fact that she is on vacation next week, it would be best for her to contact ortho in [facility] or [facility] in [place] to be seen sooner. I advised her that until the, she will need to rest, ice, wrap her foot in ace wrap, and elevate it as much as possible to help with the swelling. Patient verbalized understanding.

## 2022-08-22 ENCOUNTER — HOSPITAL ENCOUNTER (EMERGENCY)
Age: 77
Discharge: HOME | End: 2022-08-22
Attending: INTERNAL MEDICINE
Payer: MEDICARE

## 2022-08-22 ENCOUNTER — APPOINTMENT (OUTPATIENT)
Dept: GENERAL RADIOLOGY | Age: 77
End: 2022-08-22
Attending: INTERNAL MEDICINE
Payer: MEDICARE

## 2022-08-22 VITALS
RESPIRATION RATE: 18 BRPM | BODY MASS INDEX: 28.82 KG/M2 | HEART RATE: 80 BPM | HEIGHT: 65 IN | DIASTOLIC BLOOD PRESSURE: 78 MMHG | TEMPERATURE: 97.8 F | SYSTOLIC BLOOD PRESSURE: 158 MMHG | WEIGHT: 173 LBS | OXYGEN SATURATION: 100 %

## 2022-08-22 DIAGNOSIS — S82.831A CLOSED FRACTURE OF DISTAL END OF RIGHT FIBULA, UNSPECIFIED FRACTURE MORPHOLOGY, INITIAL ENCOUNTER: Primary | ICD-10-CM

## 2022-08-22 PROCEDURE — 73590 X-RAY EXAM OF LOWER LEG: CPT

## 2022-08-22 PROCEDURE — 73610 X-RAY EXAM OF ANKLE: CPT

## 2022-08-22 PROCEDURE — 99283 EMERGENCY DEPT VISIT LOW MDM: CPT

## 2022-08-22 PROCEDURE — 75810000053 HC SPLINT APPLICATION

## 2022-08-22 RX ORDER — HYDROCODONE BITARTRATE AND ACETAMINOPHEN 5; 325 MG/1; MG/1
1 TABLET ORAL
Qty: 12 TABLET | Refills: 0 | Status: SHIPPED | OUTPATIENT
Start: 2022-08-22 | End: 2022-08-25

## 2022-08-22 NOTE — ED PROVIDER NOTES
EMERGENCY DEPARTMENT HISTORY AND PHYSICAL EXAM      Date: 8/22/2022  Patient Name: Yohana Riley    History of Presenting Illness     Chief Complaint   Patient presents with    Ankle Injury       History Provided By: Patient    HPI: Yohana Riley, 68 y.o. female with h/o HTN; HLD; CAD; MVP that rolled her right ankle Friday a week ago and still c/o pain in the area above her right lateral malleolus / distal fibula area. Has crutches and has been taking analgesics. There are no other complaints, changes, or physical findings at this time. PCP: Afia Cano NP    No current facility-administered medications on file prior to encounter. Current Outpatient Medications on File Prior to Encounter   Medication Sig Dispense Refill    LOSARTAN POTASSIUM, BULK, Take 100 mg by mouth daily. cholecalciferol (Vitamin D3) 25 mcg (1,000 unit) cap Take 1,000 Units by mouth daily. spironolactone (ALDACTONE) 25 mg tablet Take 1 tablet by mouth once daily for 90 days 90 Tablet 0    simvastatin (ZOCOR) 20 mg tablet TAKE 1 TABLET BY MOUTH AT BEDTIME 90 Tablet 3    folic acid-B complex,C no.17 5 mg tab Take  by mouth.      nilotinib (Tasigna) 150 mg cap capsule Take 300 mg by mouth every twelve (12) hours. Take 2 tabs every 12 hours      cetirizine (ZYRTEC) 10 mg tablet Take 10 mg by mouth daily. multivitamin-min-iron-FA-vit K (Multi-Day Plus Minerals) 18 mg iron-400 mcg-25 mcg tab Take 1 Tab by mouth daily. b complex vitamins tablet Take 1 Tab by mouth daily. vitamin E (AQUA GEMS) 400 unit capsule Take 400 Units by mouth daily. TURMERIC PO Take  by mouth daily.          Past History     Past Medical History:  Past Medical History:   Diagnosis Date    Acute kidney injury ([address]     Aortic valve calcification     Arrhythmia     Arthritis     bilateral thumbs    CAD (coronary artery disease) Need heart valve repleace    Cancer (HCC)     Leukemia     Hypercholesterolemia     Hypertension     MVP (mitral valve prolapse)        Past Surgical History:  Past Surgical History:   Procedure Laterality Date    HX CATARACT REMOVAL  2008    HX CATARACT REMOVAL  2013    HX COLONOSCOPY  2008    1 polyp     HX COLONOSCOPY  2013    normal repeat 2018    HX MALIGNANT SKIN LESION EXCISION      HX TUBAL LIGATION         Family History:  Family History   Problem Relation Age of Onset    Alzheimer's Disease Mother     Elevated Lipids Mother     Heart Disease Mother     Hypertension Mother     Cancer Father         Lung    Cancer Paternal Uncle         Lung    Cancer Paternal Uncle         Lung    Cancer Paternal Grandfather         Lung    Elevated Lipids Sister     Hypertension Sister     Elevated Lipids Sister     Heart Disease Maternal Uncle     Heart Disease Maternal Grandfather     Hypertension Sister        Social History:  Social History     Tobacco Use    Smoking status: Never    Smokeless tobacco: Never   Vaping Use    Vaping Use: Never used   Substance Use Topics    Alcohol use: Not Currently     Alcohol/week: 0.0 standard drinks     Comment: occasional    Drug use: Not Currently       Allergies: Allergies   Allergen Reactions    Pneumovax-23 [Pneumococcal 23-Alicia Ps Vaccine] Swelling    Red Dye Swelling     Swelling of lips       Review of Systems   Review of Systems   Constitutional:  Negative for chills and fever. Respiratory:  Negative for shortness of breath. Cardiovascular:  Negative for chest pain. Gastrointestinal:  Negative for abdominal pain. Musculoskeletal:  Positive for arthralgias. Skin:  Negative for wound. Neurological:  Negative for headaches. Physical Exam   Physical Exam  Vitals and nursing note reviewed. Constitutional:       General: She is not in acute distress. Appearance: Normal appearance. Cardiovascular:      Rate and Rhythm: Regular rhythm. Heart sounds: Normal heart sounds. Pulmonary:      Breath sounds: Normal breath sounds.    Abdominal:      Palpations: Abdomen is soft. Musculoskeletal:      Comments: Mild swelling and TTP in the distal fibula; normal DP pulse; normal Achilles; normal flex/ extend   Neurological:      Mental Status: She is alert. Lab and Diagnostic Study Results   Labs -   No results found for this or any previous visit (from the past 12 hour(s)). Radiologic Studies -   @lastxrresult@  CT Results  (Last 48 hours)      None          CXR Results  (Last 48 hours)      None            Medical Decision Making and ED Course   Differential Diagnosis & Medical Decision Making Provider Note:   fractureDDX: To includebut not limited to the following: fracture, contusion, dislocation, tendon / ligament injury, sprain, strain     - I am the first provider for this patient. I reviewed the vital signs, available nursing notes, past medical history, past surgical history, family history and social history. The patients presenting problems have been discussed, and they are in agreement with the care plan formulated and outlined with them. I have encouraged them to ask questions as they arise throughout their visit. Vital Signs-Reviewed the patient's vital signs. Patient Vitals for the past 12 hrs:   Temp Pulse Resp BP SpO2   08/22/22 1332 97.8 °F (36.6 °C) 80 18 (!) 158/78 100 %       ED Course:           BONES: Fracture of the distal right fibula diaphysis. Tiny ossific fragment at   the medial malleolus, possibly reflecting avulsion injury. Old fibular tip   avulsion fracture injury. Alignment is anatomic. No aggressive appearing osseous   lytic or blastic lesion. Procedures       Disposition   Disposition:   Discharge  DISCHARGE PLAN:  1. Current Discharge Medication List        CONTINUE these medications which have NOT CHANGED    Details   LOSARTAN POTASSIUM, BULK, Take 100 mg by mouth daily. cholecalciferol (Vitamin D3) 25 mcg (1,000 unit) cap Take 1,000 Units by mouth daily.       spironolactone (ALDACTONE) 25 mg tablet Take 1 tablet by mouth once daily for 90 days  Qty: 90 Tablet, Refills: 0      simvastatin (ZOCOR) 20 mg tablet TAKE 1 TABLET BY MOUTH AT BEDTIME  Qty: 90 Tablet, Refills: 3      folic acid-B complex,C no.17 5 mg tab Take  by mouth.      nilotinib (Tasigna) 150 mg cap capsule Take 300 mg by mouth every twelve (12) hours. Take 2 tabs every 12 hours      cetirizine (ZYRTEC) 10 mg tablet Take 10 mg by mouth daily. multivitamin-min-iron-FA-vit K (Multi-Day Plus Minerals) 18 mg iron-400 mcg-25 mcg tab Take 1 Tab by mouth daily. b complex vitamins tablet Take 1 Tab by mouth daily. vitamin E (AQUA GEMS) 400 unit capsule Take 400 Units by mouth daily. TURMERIC PO Take  by mouth daily. 2.   Follow-up Information       Follow up With Specialties Details Why Contact Info    Reid Meckel, MD Orthopedic Surgery Schedule an appointment as soon as possible for a visit in 1 day  [address]  [phone]            3. Return to ED if worse   4. Current Discharge Medication List        START taking these medications    Details   HYDROcodone-acetaminophen (Norco) 5-325 mg per tablet Take 1 Tablet by mouth every six (6) hours as needed for Pain for up to 3 days. Max Daily Amount: 4 Tablets. Qty: 12 Tablet, Refills: 0  Start date: 8/22/2022, End date: 8/25/2022    Associated Diagnoses: Closed fracture of distal end of right fibula, unspecified fracture morphology, initial encounter               Diagnosis/Clinical Impression     Clinical Impression:   1. Closed fracture of distal end of right fibula, unspecified fracture morphology, initial encounter        Attestations: David Schrdaer MD, am the primary clinician of record. Please note that this dictation was completed with [email or web address], the computer voice recognition software.   Quite often unanticipated grammatical, syntax, homophones, and other interpretive errors are inadvertently transcribed by the computer software. Please disregard these errors. Please excuse any errors that have escaped final proofreading. Thank you.

## 2022-08-23 ENCOUNTER — OFFICE VISIT (OUTPATIENT)
Dept: ORTHOPEDIC SURGERY | Age: 77
End: 2022-08-23
Payer: MEDICARE

## 2022-08-23 VITALS — BODY MASS INDEX: 28.32 KG/M2 | WEIGHT: 170 LBS | HEIGHT: 65 IN

## 2022-08-23 DIAGNOSIS — F11.99 OPIOID USE, UNSPECIFIED WITH UNSPECIFIED OPIOID-INDUCED DISORDER (HCC): ICD-10-CM

## 2022-08-23 DIAGNOSIS — S82.64XA CLOSED NONDISPLACED FRACTURE OF LATERAL MALLEOLUS OF RIGHT FIBULA, INITIAL ENCOUNTER: Primary | ICD-10-CM

## 2022-08-23 DIAGNOSIS — M25.571 RIGHT ANKLE PAIN, UNSPECIFIED CHRONICITY: ICD-10-CM

## 2022-08-23 PROBLEM — E80.6 HYPERBILIRUBINEMIA: Status: ACTIVE | Noted: 2022-08-23

## 2022-08-23 PROBLEM — D75.839 THROMBOCYTHEMIA: Status: ACTIVE | Noted: 2022-08-23

## 2022-08-23 PROBLEM — E80.4 GILBERT'S SYNDROME: Status: ACTIVE | Noted: 2022-08-23

## 2022-08-23 PROBLEM — R79.89 ABNORMAL LIVER FUNCTION TESTS: Status: ACTIVE | Noted: 2022-08-23

## 2022-08-23 PROBLEM — E87.6 HYPOKALEMIA: Status: ACTIVE | Noted: 2022-08-23

## 2022-08-23 PROBLEM — R23.3 BRUISES EASILY: Status: ACTIVE | Noted: 2022-08-23

## 2022-08-23 PROBLEM — D72.89 OTHER SPECIFIED DISORDERS OF WHITE BLOOD CELLS: Status: ACTIVE | Noted: 2022-08-23

## 2022-08-23 PROBLEM — D47.1 MYELOPROLIFERATIVE DISORDER (HCC): Status: ACTIVE | Noted: 2022-08-23

## 2022-08-23 PROCEDURE — 1090F PRES/ABSN URINE INCON ASSESS: CPT | Performed by: ORTHOPAEDIC SURGERY

## 2022-08-23 PROCEDURE — G8400 PT W/DXA NO RESULTS DOC: HCPCS | Performed by: ORTHOPAEDIC SURGERY

## 2022-08-23 PROCEDURE — G8536 NO DOC ELDER MAL SCRN: HCPCS | Performed by: ORTHOPAEDIC SURGERY

## 2022-08-23 PROCEDURE — 99203 OFFICE O/P NEW LOW 30 MIN: CPT | Performed by: ORTHOPAEDIC SURGERY

## 2022-08-23 PROCEDURE — G8417 CALC BMI ABV UP PARAM F/U: HCPCS | Performed by: ORTHOPAEDIC SURGERY

## 2022-08-23 PROCEDURE — G8510 SCR DEP NEG, NO PLAN REQD: HCPCS | Performed by: ORTHOPAEDIC SURGERY

## 2022-08-23 PROCEDURE — 1101F PT FALLS ASSESS-DOCD LE1/YR: CPT | Performed by: ORTHOPAEDIC SURGERY

## 2022-08-23 PROCEDURE — G8756 NO BP MEASURE DOC: HCPCS | Performed by: ORTHOPAEDIC SURGERY

## 2022-08-23 PROCEDURE — 27786 TREATMENT OF ANKLE FRACTURE: CPT | Performed by: ORTHOPAEDIC SURGERY

## 2022-08-23 PROCEDURE — G8427 DOCREV CUR MEDS BY ELIG CLIN: HCPCS | Performed by: ORTHOPAEDIC SURGERY

## 2022-08-23 PROCEDURE — 1123F ACP DISCUSS/DSCN MKR DOCD: CPT | Performed by: ORTHOPAEDIC SURGERY

## 2022-08-23 RX ORDER — TRAMADOL HYDROCHLORIDE 50 MG/1
50 TABLET ORAL
Qty: 30 TABLET | Refills: 0 | Status: SHIPPED | OUTPATIENT
Start: 2022-08-23 | End: 2022-09-06

## 2022-08-23 RX ORDER — AMLODIPINE BESYLATE 10 MG/1
TABLET ORAL
COMMUNITY

## 2022-08-23 NOTE — LETTER
8/29/2022    Patient: Gamal Lopez   YOB: 1945   Date of Visit: 8/23/2022     Wilmer Sullivan NP  [address] [id number]  Via In Basket    Dear Wilmer Sullivan NP,      Thank you for referring Ms. Luis Carlos Acevedo to [address] [place] [address] [facility] for evaluation. My notes for this consultation are attached. If you have questions, please do not hesitate to call me. I look forward to following your patient along with you.       Sincerely,    Leigh Pleitez MD

## 2022-08-23 NOTE — PROGRESS NOTES
Name: Kris Vogel    : 1945     Service Dept: [address] [place] [address]    Chief Complaint   Patient presents with    Leg Pain     Right         Visit Vitals  Ht 5' 5\" (1.651 m)   Wt 170 lb (77.1 kg)   BMI 28.29 kg/m²        Allergies   Allergen Reactions    Pneumovax-23 [Pneumococcal 23-Alicia Ps Vaccine] Swelling    Red Dye Swelling     Swelling of lips        Current Outpatient Medications   Medication Sig Dispense Refill    amLODIPine (NORVASC) 10 mg tablet Amlodipine Oral    daily    inactive      HYDROcodone-acetaminophen (Norco) 5-325 mg per tablet Take 1 Tablet by mouth every six (6) hours as needed for Pain for up to 3 days. Max Daily Amount: 4 Tablets. 12 Tablet 0    LOSARTAN POTASSIUM, BULK, Take 100 mg by mouth daily. cholecalciferol (Vitamin D3) 25 mcg (1,000 unit) cap Take 1,000 Units by mouth daily. spironolactone (ALDACTONE) 25 mg tablet Take 1 tablet by mouth once daily for 90 days 90 Tablet 0    simvastatin (ZOCOR) 20 mg tablet TAKE 1 TABLET BY MOUTH AT BEDTIME 90 Tablet 3    folic acid-B complex,C no.17 5 mg tab Take  by mouth.      nilotinib (Tasigna) 150 mg cap capsule Take 300 mg by mouth every twelve (12) hours. Take 2 tabs every 12 hours      cetirizine (ZYRTEC) 10 mg tablet Take 10 mg by mouth daily. multivitamin-min-iron-FA-vit K (Multi-Day Plus Minerals) 18 mg iron-400 mcg-25 mcg tab Take 1 Tab by mouth daily. b complex vitamins tablet Take 1 Tab by mouth daily. vitamin E (AQUA GEMS) 400 unit capsule Take 400 Units by mouth daily. TURMERIC PO Take  by mouth daily.         Patient Active Problem List   Diagnosis Code    Palpitations R00.2    Shortness of breath R06.02    Abnormal liver function tests R79.89    Bruises easily R23.8    Gilbert's syndrome E80.4    Hyperbilirubinemia E80.6    Hypokalemia E87.6    Other specified disorders of white blood cells D72.89    Thrombocythemia D75.839    Myeloproliferative disorder ([facility] D47.1      Family History   Problem Relation Age of Onset    Alzheimer's Disease Mother     Elevated Lipids Mother     Heart Disease Mother     Hypertension Mother     Cancer Father         Lung    Cancer Paternal Uncle         Lung    Cancer Paternal Uncle         Lung    Cancer Paternal Grandfather         Lung    Elevated Lipids Sister     Hypertension Sister     Elevated Lipids Sister     Heart Disease Maternal Uncle     Heart Disease Maternal Grandfather     Hypertension Sister       Social History     Socioeconomic History    Marital status:    Tobacco Use    Smoking status: Never    Smokeless tobacco: Never   Vaping Use    Vaping Use: Never used   Substance and Sexual Activity    Alcohol use: Not Currently     Alcohol/week: 0.0 standard drinks     Comment: occasional    Drug use: Not Currently    Sexual activity: Never      Past Surgical History:   Procedure Laterality Date    HX CATARACT REMOVAL  2008    HX CATARACT REMOVAL  2013    HX COLONOSCOPY  2008    1 polyp     HX COLONOSCOPY  2013    normal repeat 2018    HX MALIGNANT SKIN LESION EXCISION      HX TUBAL LIGATION        Past Medical History:   Diagnosis Date    Acute kidney injury ([facility]     Aortic valve calcification     Arrhythmia     Arthritis     bilateral thumbs    CAD (coronary artery disease) Need heart valve repleace    Cancer (HCC)     Leukemia     Hypercholesterolemia     Hypertension     MVP (mitral valve prolapse)         I have reviewed and agree with PFS and ROS and intake form in chart and the record furthermore I have reviewed prior medical record(s) regarding this patients care during this appointment. Review of Systems:   Patient is a pleasant appearing individual, appropriately dressed, well hydrated, well nourished, who is alert, appropriately oriented for age, and in no acute distress with a normal gait and normal affect who does not appear to be in any significant pain.     Physical Exam:  Right Ankle-exquisite point tenderness to palpation on the lateral aspect on ankle, Decreased range of motion with flexion and extension, No gross instability, Weakness with plantar flexion, No skin abrasions, Positive for moderate swelling, Grossly neurovascularly intact. Left Ankle- No point tenderness, Full range of motion, No instability, No Weakness, No, skin lesions, No swelling, Grossly neurovascularly intact. Encounter Diagnoses     ICD-10-CM ICD-9-CM   1. Closed nondisplaced fracture of lateral malleolus of right fibula, initial encounter  S82.64XA 824.2   2. Right ankle pain, unspecified chronicity  M25.571 719.47       HPI:  The patient is here with a chief complaint of right leg and ankle pain. Throbbing, burning pain. X-rays are positive for nondisplaced right distal fibula fracture in good alignment on AP, lateral, and oblique done at [facility].    Assessment/Plan:  Plan at this point, right ankle CAM boot, which she has, progressive weightbearing. We will see her back in 4 to 6 weeks. We will give her a prescription for tramadol as well and go from there. If she gets worse, she is to give me a call and a handicap placard. Next time we see her back, she should not need a boot and put her on formal therapy next time as well. As part of continued conservative pain management options the patient was advised to utilize Tylenol or OTC NSAIDS as long as it is not medically contraindicated. Return to Office: Follow-up and Dispositions    Return in about 4 weeks (around 9/20/2022) for w/ Kasandra Welch, w/ xrays. Scribed by Vanessa Queen LPN as dictated by [facility] JAVIER Warren MD.  Documentation True and Accepted Enrique Warren MD

## 2022-08-23 NOTE — PATIENT INSTRUCTIONS
Broken Ankle: Care Instructions  Your Care Instructions     An ankle may break (fracture) during sports, a fall, or other accidents. Fractures can range from a small, hairline crack, to a bone or bones broken into two or more pieces. Your treatment depends on how bad the break is. Your doctor may have put your ankle in a splint or cast to allow it to heal or to keep it stable until you see another doctor. It may take weeks or months for your ankle to heal. You can help your ankle heal with some care at home. You heal best when you take good care of yourself. Eat a variety of healthy foods, and don't smoke. You may have had a sedative to help you relax. You may be unsteady after having sedation. It can take a few hours for the medicine's effects to wear off. Common side effects of sedation include nausea, vomiting, and feeling sleepy or tired. The doctor has checked you carefully, but problems can develop later. If you notice any problems or new symptoms,  get medical treatment right away. Follow-up care is a key part of your treatment and safety. Be sure to make and go to all appointments, and call your doctor if you are having problems. It's also a good idea to know your test results and keep a list of the medicines you take. How can you care for yourself at home? If the doctor gave you a sedative: For 24 hours, don't do anything that requires attention to detail, such as going to work, making important decisions, or signing any legal documents. It takes time for the medicine's effects to completely wear off. For your safety, do not drive or operate any machinery that could be dangerous. Wait until the medicine wears off and you can think clearly and react easily. Put ice or a cold pack on your ankle for 10 to 20 minutes at a time. Try to do this every 1 to 2 hours for the next 3 days (when you are awake). Put a thin cloth between the ice and your cast or splint. Keep your cast or splint dry.   Follow the cast care instructions your doctor gives you. If you have a splint, do not take it off unless your doctor tells you to. Be safe with medicines. Take pain medicines exactly as directed. If the doctor gave you a prescription medicine for pain, take it as prescribed. If you are not taking a prescription pain medicine, ask your doctor if you can take an over-the-counter medicine. Prop up your leg on pillows in the first few days after the injury. Keep the ankle higher than the level of your heart. This will help reduce swelling. Do not put weight on your ankle unless your doctor tells you to. Use crutches to walk. Follow instructions for exercises to keep your leg strong. Wiggle your toes often to reduce swelling and stiffness. When should you call for help? Call 911 anytime you think you may need emergency care. For example, call if:    You have chest pain, are short of breath, or you cough up blood. You are very sleepy and you have trouble waking up. Call your doctor now or seek immediate medical care if:    You have new or worse nausea or vomiting. You have new or worse pain. Your foot is cool or pale or changes color. You have tingling, weakness, or numbness in your toes. Your cast or splint feels too tight. You have signs of a blood clot in your leg (called a deep vein thrombosis), such as:  Pain in your calf, back of the knee, thigh, or groin. Redness or swelling in your leg. Watch closely for changes in your health, and be sure to contact your doctor if:    You have a problem with your splint or cast.     You do not get better as expected. Where can you learn more? Go to http://www.gray.com/  Enter P763 in the search box to learn more about \"Broken Ankle: Care Instructions. \"  Current as of: July 1, 2021               Content Version: 13.2  © 0628-9507 [employer].    Care instructions adapted under license by [employer] (which disclaims liability or warranty for this information). If you have questions about a medical condition or this instruction, always ask your healthcare professional. [address] any warranty or liability for your use of this information.

## 2022-09-19 ENCOUNTER — OFFICE VISIT (OUTPATIENT)
Dept: ORTHOPEDIC SURGERY | Age: 77
End: 2022-09-19
Payer: MEDICARE

## 2022-09-19 DIAGNOSIS — S82.831D OTHER CLOSED FRACTURE OF DISTAL END OF RIGHT FIBULA WITH ROUTINE HEALING, SUBSEQUENT ENCOUNTER: Primary | ICD-10-CM

## 2022-09-19 PROCEDURE — 99024 POSTOP FOLLOW-UP VISIT: CPT | Performed by: NURSE PRACTITIONER

## 2022-09-19 NOTE — PROGRESS NOTES
Name: Vivianne Bernheim    : 1945    Weight Loss Metrics 2022 2022 7/15/2022 2022 2022 2022 10/7/2021   Today's Wt 170 lb 173 lb - 175 lb 12.8 oz 176 lb 6.4 oz 177 lb 179 lb   BMI 28.29 kg/m2 28.79 kg/m2 29.25 kg/m2 29.25 kg/m2 29.35 kg/m2 29.45 kg/m2 29.79 kg/m2       There is no height or weight on file to calculate BMI. Service Dept: [facility]    Patient's Pharmacies:    [address] [facility] [address] [address]  Phone: [phone] Fax: [phone]       No chief complaint on file. HPI:  Follows up today for repeat check of a right distal fibula fracture. She is now about 4 weeks out from that injury and continues wearing her cam boot. She is weightbearing as tolerated. There were no vitals taken for this visit. Allergies   Allergen Reactions    Pneumovax-23 [Pneumococcal 23-Alicia Ps Vaccine] Swelling    Red Dye Swelling     Swelling of lips      Current Outpatient Medications   Medication Sig Dispense Refill    amLODIPine (NORVASC) 10 mg tablet Amlodipine Oral    daily    inactive      LOSARTAN POTASSIUM, BULK, Take 100 mg by mouth daily. cholecalciferol (Vitamin D3) 25 mcg (1,000 unit) cap Take 1,000 Units by mouth daily. spironolactone (ALDACTONE) 25 mg tablet Take 1 tablet by mouth once daily for 90 days 90 Tablet 0    simvastatin (ZOCOR) 20 mg tablet TAKE 1 TABLET BY MOUTH AT BEDTIME 90 Tablet 3    folic acid-B complex,C no.17 5 mg tab Take  by mouth.      nilotinib (Tasigna) 150 mg cap capsule Take 300 mg by mouth every twelve (12) hours. Take 2 tabs every 12 hours      cetirizine (ZYRTEC) 10 mg tablet Take 10 mg by mouth daily. multivitamin-min-iron-FA-vit K (Multi-Day Plus Minerals) 18 mg iron-400 mcg-25 mcg tab Take 1 Tab by mouth daily. b complex vitamins tablet Take 1 Tab by mouth daily.       vitamin E (AQUA GEMS) 400 unit capsule Take 400 Units by mouth daily. TURMERIC PO Take  by mouth daily.         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      Family History   Problem Relation Age of Onset    Alzheimer's Disease Mother     Elevated Lipids Mother     Heart Disease Mother     Hypertension Mother     Cancer Father         Lung    Cancer Paternal Uncle         Lung    Cancer Paternal Uncle         Lung    Cancer Paternal Grandfather         Lung    Elevated Lipids Sister     Hypertension Sister     Elevated Lipids Sister     Heart Disease Maternal Uncle     Heart Disease Maternal Grandfather     Hypertension Sister       Social History     Socioeconomic History    Marital status:    Tobacco Use    Smoking status: Never    Smokeless tobacco: Never   Vaping Use    Vaping Use: Never used   Substance and Sexual Activity    Alcohol use: Not Currently     Alcohol/week: 0.0 standard drinks     Comment: occasional    Drug use: Not Currently    Sexual activity: Never      Past Surgical History:   Procedure Laterality Date    HX CATARACT REMOVAL  2008    HX CATARACT REMOVAL  2013    HX COLONOSCOPY  2008    1 polyp     HX COLONOSCOPY  2013    normal repeat 2018    HX MALIGNANT SKIN LESION EXCISION      HX TUBAL LIGATION        Past Medical History:   Diagnosis Date    Acute kidney injury ([facility]     Aortic valve calcification     Arrhythmia     Arthritis     bilateral thumbs    CAD (coronary artery disease) Need heart valve repleace    Cancer (HCC)     Leukemia     Hypercholesterolemia     Hypertension     MVP (mitral valve prolapse)         I have reviewed and agree with PFSH and ROS and intake form in chart and the record furthermore I have reviewed prior medical record(s) regarding this patients care during this appointment. Physical Exam:  On physical exam today there is still some mild tenderness along the area of the fracture. She is otherwise neurovascularly intact in the right foot. X-rays taken today of the right ankle shows progressive interval healing of her distal fibula fracture. Encounter Diagnoses     ICD-10-CM ICD-9-CM   1. Other closed fracture of distal end of right fibula with routine healing, subsequent encounter  S82.831D V54.16       As part of continued conservative pain management options the patient was advised to utilize Tylenol or OTC NSAIDS as long as it is not medically contraindicated. Return to Office:   I reviewed the clinical and radiographic findings with the patient and recommended that she could wean from her boot back to a regular shoe. I will order physical therapy to begin and she will continue weightbearing as tolerated. As long as she is doing well no further joana-ray will be required but she can see us back anytime as needed.          [address] [facility]

## 2022-10-01 DIAGNOSIS — I10 ESSENTIAL HYPERTENSION: ICD-10-CM

## 2022-10-03 ENCOUNTER — HOSPITAL ENCOUNTER (OUTPATIENT)
Dept: PHYSICAL THERAPY | Age: 77
Discharge: HOME | End: 2022-10-03
Payer: MEDICARE

## 2022-10-03 PROCEDURE — 97530 THERAPEUTIC ACTIVITIES: CPT

## 2022-10-03 PROCEDURE — 97162 PT EVAL MOD COMPLEX 30 MIN: CPT

## 2022-10-03 NOTE — THERAPY EVALUATION
[address] [facility] [place], [address] [facility], [address] [place] [address]  PLAN OF CARE / STATEMENT OF MEDICAL NECESSITY FOR PHYSICAL THERAPY SERVICES  Patient Name: Archie Caballero : 1945   Medical   Diagnosis: Other fracture of upper and lower end of right fibula, subsequent encounter for closed fracture with routine healing [S82.831D] Treatment Diagnosis: R26.2   Onset Date: 22     Referral Source: Loanne Ameverardo Start of Care [facility]): 10/3/2022   Prior Hospitalization: See medical history Provider #: [id number]   Prior Level of Function: independent   Comorbidities: MELISA, arrhythmia, CAD, CA, HCL, HTN, MVP   Medications: Verified on Patient Summary List   The Plan of Care and following information is based on the information from the initial evaluation.   ==========================================================================================  Assessment / Functional Analysis:    Pt is a 69 y/o female who presents to physical therapy s/p R fibular fx on 22. Pt received in waiting area ambulating with no AD and an abnormal gait pattern of decreased gait speed, decreased L step length, and decreased eduar. Prior to surgery pt reports no AD use. Pt reports no pain at rest and slight aching when moving. Pt presents to PT with decreased ROM, decreased strength, increased pain, and decreased ability to ambulate with normalized gait pattern.  Pt could benefit from skilled PT services to address the above impairments and to improve Pt ability to participate in functional activities of choice.     ==========================================================================================  Eval Complexity: History: MEDIUM  Complexity : 1-2 comorbidities / personal factors will impact the outcome/ POC Exam:LOW Complexity : 1-2 Standardized tests and measures addressing body structure, function, activity limitation and / or participation in recreation  Presentation: LOW Complexity : Stable, uncomplicated  Clinical Decision Making:MEDIUM Complexity : FOTO score of 26-74Overall Complexity:MEDIUM    Problem List: decrease ROM, decrease strength, impaired gait/ balance, decrease ADL/ functional abilitiies, decrease activity tolerance, decrease flexibility/ joint mobility, and decrease transfer abilities   Treatment Plan may include any combination of the following: Therapeutic exercise, Therapeutic activities, Neuromuscular re-education, Physical agent/modality, Gait/balance training, Manual therapy, and Patient education  Patient / Family readiness to learn indicated by: asking questions, trying to perform skills, and interest  Persons(s) to be included in education: patient (P)  Barriers to Learning/Limitations: None      Patient self reported health status: excellent  Rehabilitation Potential: excellent    Objective Measures: FOTO: 57  LEFS: 45.3  SLS:  LLE: 20 seconds;  RLE = <5 seconds    ROM / Strength  [] Unable to assess                            AROM                         PROM                       Strength       Left Right Left Right Left Right   Hip Flexion                 Extension                 Abduction                 Adduction               Knee Flexion                 Extension               Ankle Plantarflexion 47 32     5/5 4/5     Dorsiflexion  Inversion  Eversion 14  34  22 6  22  16     5/5  5/5  5/5 4/5  4/5  4/5      Short Term Goals: 3 weeks  Patient will report the knowledge of 3 exercises that can be used to help reduce symptoms to be able to ind reduce symptoms while at home. Patient will demonstrate a 1/2 grade improvement in R ankle MMT to be able to better ambulate with a normalized gait without pain. Patient will demonstrate R ankle PROM improvement of 15 degrees in sagittal plane and 10 degrees in frontal plane to facilitate increased ability to ascend/descend stairs.   Patient will increase LEFS > 10 points to facilitate increased ability to perform functional activities of choice. Long Term Goals: 6 weeks  Patient will demonstrate a 2 grade improvement in R ankle MMT to be able to better ambulate with a normalized gait without pain. Patient will demonstrate R ankle AROM of improvement of 20 degrees in sagittal plane and 10 degrees in frontal plane or greater to be able to return to normal ambulation on level and unlevel surfaces. Patient will demonstrate the ability to ambulate > 500 feet without an AD without evidence of antalgia to be able to return to an ind walking program following discharge. Patient will increase LEFS > 20 points to facilitate increased ability to perform leisure activities of choice. Frequency / Duration: Patient to be seen  1-2  times per week for 4-6  weeks:  Patient / Caregiver education and instruction: self care, activity modification, and exercises    Therapist Signature: Pablo Flores PT, DPT Date: 91/3/4191   Certification Period: 10/3/22 - 1/3/23 Time: 12:56 PM   ===========================================================================================  I certify that the above Physical Therapy Services are being furnished while the patient is under my care. I agree with the treatment plan and certify that this therapy is necessary. Physician Signature:        Date:       Time:     Please sign and return to [facility] PT or you may fax the signed copy to [phone]. Please call [phone] if more information required. Thank you.

## 2022-10-03 NOTE — PROGRESS NOTES
[facility] EVAL/ PT DAILY TREATMENT NOTE 10-18    Patient Name: Obie Greco  Date:10/3/2022  : 1945  [x]  Patient  Verified  Payor: Sagrario Duckworth / Plan: [place] / Product Type: Medicare /    In time: 1100  Out time: 5396  Total Treatment Time (min): 45  Visit #: 1    Medicare/BCBS Only   Total Timed Codes (min):  15 1:1 Treatment Time:  45     Treatment Area: Other fracture of upper and lower end of right fibula, subsequent encounter for closed fracture with routine healing [S82.837F]    SUBJECTIVE  Pt is a 69 y/o female who presents to physical therapy s/p R fibular fx on 22. Pt received in waiting area ambulating with no AD and an abnormal gait pattern of decreased gait speed, decreased L step length, and decreased eduar. Prior to surgery pt reports no AD use. Pt reports no pain at rest and slight aching when moving. Pt presents to PT with decreased ROM, decreased strength, increased pain, and decreased ability to ambulate with normalized gait pattern. Pt could benefit from skilled PT services to address the above impairments and to improve Pt ability to participate in functional activities of choice.      Pt. Goals: Reduce pain, restore normal ambulation    Pain Level (0-10 scale): Current:  0/10 Worst: 2/10  []constant [x]intermittent []improving []worsening []no change since onset      PMH:   Past Medical History:   Diagnosis Date    Acute kidney injury ([address]     Aortic valve calcification     Arrhythmia     Arthritis     bilateral thumbs    CAD (coronary artery disease) Need heart valve repleace    Cancer (HCC)     Leukemia     Hypercholesterolemia     Hypertension     MVP (mitral valve prolapse)         Imaging: NA  Prior Treatment: NA      Any medication changes, allergies to medications, adverse drug reactions, diagnosis change, or new procedure performed?: [x] No    [] Yes (see summary sheet for update)        OBJECTIVE/EXAMINATION  Palpation: No TTP    Posture: [] Varus    [] Valgus          [] Tibial Torsion    [] Foot Supination    [] Foot Pronation    Describe:      ROM / Strength  [] Unable to assess                    AROM                         PROM                     Strength     Left Right Left Right Left Right   Hip Flexion          Extension          Abduction          Adduction         Knee Flexion          Extension         Ankle Plantarflexion 47 32   5/5 4/5    Dorsiflexion  Inversion  Eversion 14  34  22 6  22  16   5/5  5/5  5/5 4/5  4/5  4/5       Flexibility: [] Unable to assess at this time  Hamstrings (measured passively in 90/90 position) :    Left   degrees  Right   degrees    Quadriceps (measured passively in prone):    Left   degrees  Right   degrees    Gastrocnemius (measured passively in prone):      Left   degrees  Right   degrees    Girth Measurements:     Inframalleolar              Left   cm     Right   cm    Figure 8:            Left   cm     Right   cm    Metatarsal heads:        Left   cm     Right   cm     Special tests:  Anterior drawer  [] Neg    [] Pos   Talar tilt   [] Neg    [] Pos   Long bone comp [] Neg    [] Pos   Navicular drop  [] Neg    [] Pos   Feiss Line  [] Neg    [] Pos   Squeeze Test  [] Neg    [] Pos                    .    Gait:  [] Normal    [x] Abnormal    [] Antalgic    [] NWB    Device: SPC    Distance: 150  Comments: decreased gait speed, decreased L step length, and decreased eduar       Balance:   SLS:  LLE: 20 seconds;  RLE = <5 seconds    Other tests/comments:  FOTO: 57  LEFS: 45.3       Modality rationale:    Min Type Additional Details    [] Estim:  []Unatt       []IFC  []Premod                        []Other:  []w/ice   []w/heat  Position:  Location:    [] Estim: []Att    []TENS instruct  []NMES                    []Other:  []w/US   []w/ice   []w/heat  Position:  Location:         []  Ultrasound: []Continuous   [] Pulsed                           []1MHz   []3MHz Location:  W/cm2:         []  Ice     [] heat  []  Ice massage  []  Laser   []  Anodyne Position:  Location:         []  Vasopneumatic Device Pressure:       [] lo [] med [] hi   Temperature: [] lo [] med [] hi   [] Skin assessment post-treatment:  []intact []redness- no adverse reaction    []redness - adverse reaction:     30 min [x]Eval                  []Re-Eval     15 min Therapeutic Activity:  [x]  See flow sheet :   Rationale: increase ROM and increase strength  to improve the patients ability to ambulate     With   [] TE   [] TA   [] neuro   [] other: Patient Education: [x] Review HEP    [] Progressed/Changed HEP based on:   [] positioning   [] body mechanics   [] transfers   [] heat/ice application    [] other:        Pain Level (0-10 scale) post treatment: 0/10      ASSESSMENT/Functional Analysis     [x]  See plan of care  []  Discharge due to:_  []  Other:Stacie Jang PT, GAYLA 10/3/2022  10:55 AM

## 2022-10-05 ENCOUNTER — HOSPITAL ENCOUNTER (OUTPATIENT)
Dept: LAB | Age: 77
Discharge: HOME | End: 2022-10-05
Payer: MEDICARE

## 2022-10-05 DIAGNOSIS — N17.9 AKI (ACUTE KIDNEY INJURY) (HCC): ICD-10-CM

## 2022-10-05 DIAGNOSIS — I10 ESSENTIAL HYPERTENSION: ICD-10-CM

## 2022-10-05 DIAGNOSIS — N18.31 STAGE 3A CHRONIC KIDNEY DISEASE (HCC): ICD-10-CM

## 2022-10-05 LAB
ALBUMIN SERPL-MCNC: 3.9 G/DL (ref 3.4–5)
AMORPH CRY URNS QL MICRO: ABNORMAL
ANION GAP SERPL CALC-SCNC: 7 MMOL/L (ref 3–18)
APPEARANCE UR: CLEAR
BACTERIA URNS QL MICRO: NEGATIVE /HPF
BILIRUB UR QL: NEGATIVE
BUN SERPL-MCNC: 15 MG/DL (ref 7–18)
BUN/CREAT SERPL: 13 (ref 12–20)
CA-I BLD-MCNC: 9.4 MG/DL (ref 8.5–10.1)
CHLORIDE SERPL-SCNC: 101 MMOL/L (ref 100–111)
CO2 SERPL-SCNC: 28 MMOL/L (ref 21–32)
COLOR UR: ABNORMAL
CREAT SERPL-MCNC: 1.13 MG/DL (ref 0.6–1.3)
CREAT UR-MCNC: 105 MG/DL (ref 30–125)
EPITH CASTS URNS QL MICRO: ABNORMAL /LPF (ref 0–20)
GLUCOSE SERPL-MCNC: 97 MG/DL (ref 74–99)
GLUCOSE UR STRIP.AUTO-MCNC: NEGATIVE MG/DL
HGB UR QL STRIP: NEGATIVE
KETONES UR QL STRIP.AUTO: NEGATIVE MG/DL
LEUKOCYTE ESTERASE UR QL STRIP.AUTO: ABNORMAL
NITRITE UR QL STRIP.AUTO: NEGATIVE
PH UR STRIP: 5.5 [PH] (ref 5–8)
PHOSPHATE SERPL-MCNC: 3.4 MG/DL (ref 2.5–4.9)
POTASSIUM SERPL-SCNC: 4.2 MMOL/L (ref 3.5–5.5)
PROT UR STRIP-MCNC: NEGATIVE MG/DL
PROT UR-MCNC: 8 MG/DL
PROT/CREAT UR-RTO: 0.1
RBC #/AREA URNS HPF: ABNORMAL /HPF (ref 0–2)
SODIUM SERPL-SCNC: 136 MMOL/L (ref 136–145)
SP GR UR REFRACTOMETRY: 1.01 (ref 1–1.03)
UROBILINOGEN UR QL STRIP.AUTO: 0.2 EU/DL (ref 0.2–1)
WBC URNS QL MICRO: ABNORMAL /HPF (ref 0–4)

## 2022-10-05 PROCEDURE — 81001 URINALYSIS AUTO W/SCOPE: CPT

## 2022-10-05 PROCEDURE — 84156 ASSAY OF PROTEIN URINE: CPT

## 2022-10-05 PROCEDURE — 80069 RENAL FUNCTION PANEL: CPT

## 2022-10-06 ENCOUNTER — HOSPITAL ENCOUNTER (OUTPATIENT)
Dept: PHYSICAL THERAPY | Age: 77
Discharge: HOME | End: 2022-10-06
Payer: MEDICARE

## 2022-10-06 PROCEDURE — 97110 THERAPEUTIC EXERCISES: CPT

## 2022-10-06 PROCEDURE — 97016 VASOPNEUMATIC DEVICE THERAPY: CPT

## 2022-10-06 NOTE — PROGRESS NOTES
PT DAILY TREATMENT NOTE 8-    Patient Name: Brianna Bangura  Date:10/6/2022  : 1945  [x]  Patient  Verified  Payor: [place] MEDICARE / Plan: [place] MEDICARE PART A & B / Product Type: Medicare /    In time:1323  Out time:1411  Total Treatment Time (min): 48  Total Timed Codes (min): 38  1:1 Treatment Time (min): 38   Visit #: 2     Treatment Area: Other fracture of upper and lower end of right fibula, subsequent encounter for closed fracture with routine healing [S85.545K]    SUBJECTIVE  Pt arrived with no AD, tennis shoes donned and slight antalgic gait. Pain Level (0-10 scale): 4    Any medication changes, allergies to medications, adverse drug reactions, diagnosis change, or new procedure performed?: [x] No    [] Yes (see summary sheet for update)        OBJECTIVE  Modality rationale: decrease inflammation and decrease pain to improve the patients ability to return to prior level of function before injury/illness with reduced pain, achieving optimal strength and function to perform household tasks, daily activities, and return to community events, and/or work.      Min Type Additional Details    [] Estim: []Att   []Unatt  []TENS instruct                 []IFC  []Premod []NMES                       []Other:  []w/US   []w/ice   []w/heat  Position:  Location:    []  Traction: [] Cervical       []Lumbar                       [] Prone          []Supine                       []Intermittent   []Continuous Lbs:  [] before manual  [] after manual    []  Ultrasound: []Continuous   [] Pulsed                           []1MHz   []3MHz Location:  W/cm2:    []  Iontophoresis with dexamethasone         Location: [] Take home patch   [] In clinic    []  Ice     []  heat  []  Ice massage Position:  Location:   10 [x]  Vasopneumatic Device Pressure: [x] lo [] med [] hi   Temp: [x] lo [] med [] hi   [] Skin assessment post-treatment:  []intact []redness- no adverse reaction       []redness - adverse reaction:           38 min Therapeutic Exercise:  [x] See flow sheet :   Rationale: increase ROM, increase strength, and improve balance to improve the patients ability to return to prior level of function before injury/illness with reduced pain, achieving optimal strength and function to perform household tasks, daily activities, and return to community events, and/or work. min Therapeutic Activity:  []  See flow sheet :   Rationale:       min Neuromuscular Re-education:  []  See flow sheet :   Rationale:        min Manual Therapy:     Rationale:      min Gait Training:  ___ feet with ___ device on level surfaces with ___ level of assist   Rationale: With TE  TA   NR  GT   Misc Patient Education: [x] Review HEP    [] Progressed/Changed HEP based on:   [] positioning   [] body mechanics   [] transfers   [] heat/ice application          Pain Level (0-10 scale) post treatment: 1    ASSESSMENT/Changes in Function: Session began with stepper for active warm up with no increased pain. Continued with review of HEP via gastroc stretch, soleus stretch on the R ankle with demonstration provided for optimal outcome. Ankle active ROM via ABC's with emphasis to involve the ankle and reduce knee involvement. Strength exercise included 4 way ankle using graded thera band (RTB). Standing exercise including calf/toe raises, step ups, sit to stand and SLS. No adverse reaction with exercise. Vaso compression applied to R ankle post exercise in effort to reduce pain. Pain decreased at end of treatment. Water break taken as needed. Continue with POC. Patient will continue to benefit from skilled PT services to modify and progress therapeutic interventions, address functional mobility deficits, address ROM deficits, address strength deficits, analyze and cue movement patterns, analyze and modify body mechanics/ergonomics, and assess and modify postural abnormalities to attain remaining goals.      [x]  See Plan of Care  []  See progress note/recertification  []  See Discharge Summary           PLAN  []  Upgrade activities as tolerated     [x]  Continue plan of care  []  Update interventions per flow sheet       []  Discharge due to:_  []  Other:_      Romayne Blackbird , LPTA 10/6/2022  2:27 PM

## 2022-10-10 ENCOUNTER — HOSPITAL ENCOUNTER (OUTPATIENT)
Dept: PHYSICAL THERAPY | Age: 77
Discharge: HOME | End: 2022-10-10
Payer: MEDICARE

## 2022-10-10 PROCEDURE — 97016 VASOPNEUMATIC DEVICE THERAPY: CPT

## 2022-10-10 PROCEDURE — 97110 THERAPEUTIC EXERCISES: CPT

## 2022-10-10 RX ORDER — LOSARTAN POTASSIUM 100 MG/1
TABLET ORAL
Qty: 90 TABLET | Refills: 0 | Status: SHIPPED | OUTPATIENT
Start: 2022-10-10

## 2022-10-10 RX ORDER — SPIRONOLACTONE 25 MG/1
TABLET ORAL
Qty: 90 TABLET | Refills: 0 | Status: SHIPPED | OUTPATIENT
Start: 2022-10-10

## 2022-10-10 NOTE — PROGRESS NOTES
PT DAILY TREATMENT NOTE 8-    Patient Name: Archie Caballero  Date:10/10/2022  : 1945  [x]  Patient  Verified  Payor: Litzy Sana / Plan: [place] MEDICARE PART A & B / Product Type: Medicare /    In time:1400  Out time:1458  Total Treatment Time (min): 58  Total Timed Codes (min): 48  1:1 Treatment Time (min): 48  Visit #: 3    Treatment Area: Other fracture of upper and lower end of right fibula, subsequent encounter for closed fracture with routine healing [S87.909C]    SUBJECTIVE  Pt arrived with no AD, tennis shoes donned and slight antalgic gait. Pain Level (0-10 scale): 3    Any medication changes, allergies to medications, adverse drug reactions, diagnosis change, or new procedure performed?: [x] No    [] Yes (see summary sheet for update)        OBJECTIVE  Modality rationale: decrease inflammation and decrease pain to improve the patients ability to return to prior level of function before injury/illness with reduced pain, achieving optimal strength and function to perform household tasks, daily activities, and return to community events, and/or work.      Min Type Additional Details    [] Estim: []Att   []Unatt  []TENS instruct                 []IFC  []Premod []NMES                       []Other:  []w/US   []w/ice   []w/heat  Position:  Location:    []  Traction: [] Cervical       []Lumbar                       [] Prone          []Supine                       []Intermittent   []Continuous Lbs:  [] before manual  [] after manual    []  Ultrasound: []Continuous   [] Pulsed                           []1MHz   []3MHz Location:  W/cm2:    []  Iontophoresis with dexamethasone         Location: [] Take home patch   [] In clinic    []  Ice     []  heat  []  Ice massage Position:  Location:   10 [x]  Vasopneumatic Device Pressure: [x] lo [] med [] hi   Temp: [x] lo [] med [] hi   [] Skin assessment post-treatment:  []intact []redness- no adverse reaction       []redness - adverse reaction:           48 min Therapeutic Exercise:  [x] See flow sheet :   Rationale: increase ROM, increase strength, and improve balance to improve the patients ability to return to prior level of function before injury/illness with reduced pain, achieving optimal strength and function to perform household tasks, daily activities, and return to community events, and/or work. min Therapeutic Activity:  []  See flow sheet :   Rationale:       min Neuromuscular Re-education:  []  See flow sheet :   Rationale:        min Manual Therapy:     Rationale:      min Gait Training:  ___ feet with ___ device on level surfaces with ___ level of assist   Rationale: With TE  TA   NR  GT   Misc Patient Education: [x] Review HEP    [] Progressed/Changed HEP based on:   [] positioning   [] body mechanics   [] transfers   [] heat/ice application          Pain Level (0-10 scale) post treatment: 2    ASSESSMENT/Changes in Function: Session began with stepper for active warm up with no increased pain. Followed by gastroc stretch, soleus stretch on the R ankle with demonstration provided for optimal outcome. Ankle active ROM and ABC's with emphasis to involve the ankle and reduce knee involvement. Strength exercise included 4 way ankle using graded thera band (RTB). Standing exercise including calf/toe raises, step ups, sit to stand and SLS. Introduced this date: Step ups laterally, mini squats,heel walking, seated Rocker board, R ankle isometrics and Lilian Stammer to work on stability, strength, range and balance. No adverse reaction with exercise. Vaso compression applied to R ankle post exercise in effort to reduce pain. Pain decreased at end of treatment. Water break taken as needed. Continue with POC.      Patient will continue to benefit from skilled PT services to modify and progress therapeutic interventions, address functional mobility deficits, address ROM deficits, address strength deficits, analyze and cue movement patterns, analyze and modify body mechanics/ergonomics, and assess and modify postural abnormalities to attain remaining goals.      [x]  See Plan of Care  []  See progress note/recertification  []  See Discharge Summary           PLAN  []  Upgrade activities as tolerated     [x]  Continue plan of care  []  Update interventions per flow sheet       []  Discharge due to:_  []  Other:_      KEZIA Marshall 10/10/2022  3:00 PM

## 2022-10-13 ENCOUNTER — HOSPITAL ENCOUNTER (OUTPATIENT)
Dept: PHYSICAL THERAPY | Age: 77
Discharge: HOME | End: 2022-10-13
Payer: MEDICARE

## 2022-10-13 PROCEDURE — 97016 VASOPNEUMATIC DEVICE THERAPY: CPT

## 2022-10-13 PROCEDURE — 97110 THERAPEUTIC EXERCISES: CPT

## 2022-10-13 NOTE — PROGRESS NOTES
PT DAILY TREATMENT NOTE     Patient Name: Kevin Batch  Date:10/13/2022  : 1945  [x]  Patient  Verified  Payor: [place] MEDICARE / Plan: [place] MEDICARE PART A & B / Product Type: Medicare /    In time:1402  Out time:1504  Total Treatment Time (min): 62  Total Timed Codes (min): 52  1:1 Treatment Time (min): 52  Visit #: 4    Treatment Area: Other fracture of upper and lower end of right fibula, subsequent encounter for closed fracture with routine healing [S86.426M]    SUBJECTIVE  Pt arrived with no AD, tennis shoes donned and slight antalgic gait, minimal c/o pain. Pain Level (0-10 scale): 3    Any medication changes, allergies to medications, adverse drug reactions, diagnosis change, or new procedure performed?: [x] No    [] Yes (see summary sheet for update)        OBJECTIVE  Modality rationale: decrease inflammation and decrease pain to improve the CP/Vaso post session to decrease pain and edema from exercises.        Min Type Additional Details    [] Estim: []Att   []Unatt  []TENS instruct                 []IFC  []Premod []NMES                       []Other:  []w/US   []w/ice   []w/heat  Position:  Location:    []  Traction: [] Cervical       []Lumbar                       [] Prone          []Supine                       []Intermittent   []Continuous Lbs:  [] before manual  [] after manual    []  Ultrasound: []Continuous   [] Pulsed                           []1MHz   []3MHz Location:  W/cm2:    []  Iontophoresis with dexamethasone         Location: [] Take home patch   [] In clinic    []  Ice     []  heat  []  Ice massage Position:  Location:   10 [x]  Vasopneumatic Device Pressure: [x] lo [] med [] hi   Temp: [x] lo [] med [] hi   [] Skin assessment post-treatment:  []intact []redness- no adverse reaction       []redness - adverse reaction:           52 min Therapeutic Exercise:  [x] See flow sheet :   Rationale: increase ROM, increase strength, and improve balance to improve the patients ability to return to prior level of function before injury/illness with reduced pain, achieving optimal strength and function to perform household tasks, daily activities, and return to community events, and/or work. With TE  TA   NR  GT   [facility] Patient Education: [x] Review HEP    [] Progressed/Changed HEP based on:   [] positioning   [] body mechanics   [] transfers   [] heat/ice application          Pain Level (0-10 scale) post treatment: 2    ASSESSMENT/Changes in Function: Session began with stepper for active warm up with no increased pain. Followed by gastroc stretch, soleus stretch on the R ankle with demonstration provided for optimal outcome. Ankle active ROM and ABC's with emphasis to involve the ankle and reduce knee involvement. Strength exercise included 4 way ankle using graded thera band (RTB). Standing exercise including calf/toe raises, step ups, sit to stand and SLS. Continued with step ups laterally, mini squats,heel walking, seated Rocker board, tandem stance, and SLS, for strength, range and balance. No adverse reaction with exercise. Vaso compression applied to R ankle post exercise in effort to reduce pain. Pain decreased at end of treatment. Water break taken as needed. Continue with POC. Patient will continue to benefit from skilled PT services to modify and progress therapeutic interventions, address functional mobility deficits, address ROM deficits, address strength deficits, analyze and cue movement patterns, analyze and modify body mechanics/ergonomics, and assess and modify postural abnormalities to attain remaining goals.      [x]  See Plan of Care  []  See progress note/recertification  []  See Discharge Summary           PLAN  []  Upgrade activities as tolerated     [x]  Continue plan of care  []  Update interventions per flow sheet       []  Discharge due to:_  []  Other:_      Rajat Long PTA, LPTA 10/13/2022  4:05 PM

## 2022-10-17 ENCOUNTER — HOSPITAL ENCOUNTER (OUTPATIENT)
Dept: PHYSICAL THERAPY | Age: 77
Discharge: HOME | End: 2022-10-17
Payer: MEDICARE

## 2022-10-17 PROCEDURE — 97530 THERAPEUTIC ACTIVITIES: CPT

## 2022-10-17 PROCEDURE — 97016 VASOPNEUMATIC DEVICE THERAPY: CPT

## 2022-10-17 PROCEDURE — 97110 THERAPEUTIC EXERCISES: CPT

## 2022-10-17 NOTE — PROGRESS NOTES
PT DAILY TREATMENT NOTE 8    Patient Name: Damir Courtney  Date:10/17/2022  : 1945  [x]  Patient  Verified  Payor: Chelsie Corea / Plan: [place] MEDICARE PART A & B / Product Type: Medicare /    In time: 1400  Out time: 1503  Total Treatment Time (min): 63  Total Timed Codes (min): 53  1:1 Treatment Time (min): 63   Visit # 5      Treatment Area: Other fracture of upper and lower end of right fibula, subsequent encounter for closed fracture with routine healing [S83.837I]    SUBJECTIVE  Pt reports reduced R ankle pain today. She is compliant with HEP.   Pain Level (0-10 scale): 2/10  Any medication changes, allergies to medications, adverse drug reactions, diagnosis change, or new procedure performed?: [x] No    [] Yes (see summary sheet for update)        OBJECTIVE  Modality rationale: decrease edema, decrease inflammation, and decrease pain to improve the patients ability to ambulate   Min Type Additional Details    [] Estim: []Att   []Unatt  []TENS instruct                 []IFC  []Premod []NMES                       []Other:  []w/US   []w/ice   []w/heat  Position:  Location:    []  Traction: [] Cervical       []Lumbar                       [] Prone          []Supine                       []Intermittent   []Continuous Lbs:  [] before manual  [] after manual    []  Ultrasound: []Continuous   [] Pulsed                           []1MHz   []3MHz Location:  W/cm2:         []  Ice     []  heat  []  Ice massage Position:  Location:   10 [x]  Vasopneumatic Device Pressure: [x] lo [] med [] hi   Temp: [x] lo [] med [] hi   [x] Skin assessment post-treatment:  [x]intact []redness- no adverse reaction       []redness - adverse reaction:       33 min Therapeutic Exercise:  [x] See flow sheet :   Rationale: increase ROM and increase strength to improve the patients ability to ambulate    20 min Therapeutic Activity:  [x]  See flow sheet :   Rationale: increase coordination to improve pt ability to transfer With TE Patient Education: [x] Review HEP    [] Progressed/Changed HEP based on:   [] positioning   [] body mechanics   [] transfers   [] heat/ice application          Pain Level (0-10 scale) post treatment: 0/10    ASSESSMENT/Changes in Function:   Session initiated on stepper followed by self-stretching and PRE. Today's Tx session emphasized exercises per POC to increase R ankle ROM and strength with Pt tolerating Tx with no adverse effects. Pt demonstrates increased activity tolerance when performing exercises during today's Tx session. PT will continue per POC, progressing as tolerated. Patient will continue to benefit from skilled PT services to modify and progress therapeutic interventions, address functional mobility deficits, address ROM deficits, address strength deficits, analyze and address soft tissue restrictions, analyze and cue movement patterns, analyze and modify body mechanics/ergonomics, and address imbalance/dizziness to attain remaining goals.      []  See Plan of Care  []  See progress note/recertification  []  See Discharge Summary             PLAN  [x]  Upgrade activities as tolerated     [x]  Continue plan of care  [x]  Update interventions per flow sheet       []  Discharge due to:_  []  Other:_      Carlos Gibbs, PT, DPT 10/17/2022  6:15 PM

## 2022-10-19 ENCOUNTER — OFFICE VISIT (OUTPATIENT)
Dept: NEPHROLOGY | Age: 77
End: 2022-10-19
Payer: MEDICARE

## 2022-10-19 VITALS
WEIGHT: 171.6 LBS | HEIGHT: 65 IN | OXYGEN SATURATION: 97 % | BODY MASS INDEX: 28.59 KG/M2 | HEART RATE: 70 BPM | DIASTOLIC BLOOD PRESSURE: 74 MMHG | SYSTOLIC BLOOD PRESSURE: 129 MMHG

## 2022-10-19 DIAGNOSIS — N17.9 AKI (ACUTE KIDNEY INJURY) (HCC): ICD-10-CM

## 2022-10-19 DIAGNOSIS — N18.31 STAGE 3A CHRONIC KIDNEY DISEASE (HCC): ICD-10-CM

## 2022-10-19 DIAGNOSIS — I10 ESSENTIAL HYPERTENSION: Primary | ICD-10-CM

## 2022-10-19 PROCEDURE — G8400 PT W/DXA NO RESULTS DOC: HCPCS | Performed by: INTERNAL MEDICINE

## 2022-10-19 PROCEDURE — 99213 OFFICE O/P EST LOW 20 MIN: CPT | Performed by: INTERNAL MEDICINE

## 2022-10-19 PROCEDURE — G8536 NO DOC ELDER MAL SCRN: HCPCS | Performed by: INTERNAL MEDICINE

## 2022-10-19 PROCEDURE — 1123F ACP DISCUSS/DSCN MKR DOCD: CPT | Performed by: INTERNAL MEDICINE

## 2022-10-19 PROCEDURE — 1101F PT FALLS ASSESS-DOCD LE1/YR: CPT | Performed by: INTERNAL MEDICINE

## 2022-10-19 PROCEDURE — G8754 DIAS BP LESS 90: HCPCS | Performed by: INTERNAL MEDICINE

## 2022-10-19 PROCEDURE — G8510 SCR DEP NEG, NO PLAN REQD: HCPCS | Performed by: INTERNAL MEDICINE

## 2022-10-19 PROCEDURE — 1090F PRES/ABSN URINE INCON ASSESS: CPT | Performed by: INTERNAL MEDICINE

## 2022-10-19 PROCEDURE — G8752 SYS BP LESS 140: HCPCS | Performed by: INTERNAL MEDICINE

## 2022-10-19 PROCEDURE — G8427 DOCREV CUR MEDS BY ELIG CLIN: HCPCS | Performed by: INTERNAL MEDICINE

## 2022-10-19 PROCEDURE — G8417 CALC BMI ABV UP PARAM F/U: HCPCS | Performed by: INTERNAL MEDICINE

## 2022-10-19 NOTE — PROGRESS NOTES
Uri Willingham presents today for a follow up. Patient states no concerns for today. Chief Complaint   Patient presents with    Follow-up     Essential Hypertension       Is someone accompanying this pt? No    Is the patient using any DME equipment during OV? No    Depression Screening:  3 most recent PHQ Screens 10/19/2022   Little interest or pleasure in doing things Not at all   Feeling down, depressed, irritable, or hopeless Not at all   Total Score PHQ 2 0       Learning Assessment:  Learning Assessment 9/7/2021   PRIMARY LEARNER Patient   HIGHEST LEVEL OF EDUCATION - PRIMARY LEARNER  GRADUATED HIGH SCHOOL OR GED   BARRIERS PRIMARY LEARNER NONE   CO-LEARNER CAREGIVER No   PRIMARY LANGUAGE ENGLISH   LEARNER PREFERENCE PRIMARY LISTENING   ANSWERED BY patient   RELATIONSHIP SELF       Abuse Screening:  Abuse Screening Questionnaire 1/28/2021   Do you ever feel afraid of your partner? N   Are you in a relationship with someone who physically or mentally threatens you? N   Is it safe for you to go home? Y       Fall Risk  Fall Risk Assessment, last 12 mths 4/19/2022   Able to walk? Yes   Fall in past 12 months? 0   Do you feel unsteady? 0   Are you worried about falling 0       ADL  No flowsheet data found. Health Maintenance reviewed and discussed and ordered per Provider. Health Maintenance Due   Topic Date Due    COVID-19 Vaccine (1) Never done    Shingrix Vaccine Age 50> (1 of 2) Never done    DTaP/Tdap/Td series (1 - Tdap) Never done    Bone Densitometry (Dexa) Screening  Never done    Medicare Yearly Exam  01/29/2022    Flu Vaccine (1) Never done   . Coordination of Care:  1. Have you been to the ER, urgent care clinic since your last visit? Hospitalized since your last visit? Yes, 8/12/22 broken leg. Heart Cath done in July    2. Have you seen or consulted any other health care providers outside of the [address] [place] [address] since your last visit? Include any pap smears or colon screening. NO

## 2022-10-19 NOTE — PROGRESS NOTES
Renal Progress Note           Daily Progress Note: 10/19/2022      Subjective: The patient is seen for follow  up of CKD. HPI : Patient is here for a follow-up. Denies any complaints today. Denies any shortness of breath or swelling in the legs. Denies any chest pain. Denies any dysuria  Blood pressure is very well controlled in the office today  Creatinine stable at 1.1  She is not vaccinated for Covid    Medications reviewed  Current Outpatient Medications   Medication Sig    losartan (COZAAR) 100 mg tablet Take 1 tablet by mouth once daily    spironolactone (ALDACTONE) 25 mg tablet Take 1 tablet by mouth once daily    LOSARTAN POTASSIUM, BULK, Take 100 mg by mouth daily. cholecalciferol (VITAMIN D3) 25 mcg (1,000 unit) cap Take 1,000 Units by mouth daily. simvastatin (ZOCOR) 20 mg tablet TAKE 1 TABLET BY MOUTH AT BEDTIME    folic acid-B complex,C no.17 5 mg tab Take  by mouth.    nilotinib (Tasigna) 150 mg cap capsule Take 300 mg by mouth every twelve (12) hours. Take 2 tabs every 12 hours    cetirizine (ZYRTEC) 10 mg tablet Take 10 mg by mouth daily. multivitamin-min-iron-FA-vit K (Multi-Day Plus Minerals) 18 mg iron-400 mcg-25 mcg tab Take 1 Tab by mouth daily. b complex vitamins tablet Take 1 Tab by mouth daily. vitamin E (AQUA GEMS) 400 unit capsule Take 400 Units by mouth daily. TURMERIC PO Take  by mouth daily. amLODIPine (NORVASC) 10 mg tablet Amlodipine Oral    daily    inactive (Patient not taking: Reported on 10/19/2022)     No current facility-administered medications for this visit. Review of Systems:     Constitutional: Negative for chills and fever. HENT: Negative. Eyes: Negative. Respiratory: Negative. Cardiovascular: Negative. Gastrointestinal: Negative for abdominal pain and nausea. Skin: Negative. Neurological: Negative.     Objective:   Physical Exam:     Visit Vitals  /74 (BP 1 Location: Right arm, BP Patient Position: Sitting, BP Cuff Size: Adult)   Pulse 70   Ht 5' 5\" (1.651 m)   Wt 77.8 kg (171 lb 9.6 oz)   SpO2 97%   BMI 28.56 kg/m²               General:  Alert, cooperative, no distress, appears stated age. Lungs:   Clear to auscultation bilaterally. Chest wall:  No tenderness or deformity. Heart:  Regular rate and rhythm, S1, S2 normal, no murmur, click, rub or gallop. Abdomen:   Soft, non-tender. Bowel sounds normal. No masses,  No organomegaly. Extremities: Extremities normal, atraumatic, no cyanosis or edema. Pulses: 2+ and symmetric all extremities. Skin: Skin color, texture, turgor normal. No rashes or lesions   Neurologic: CNII-XII intact. No gross sensory or motor deficits     Data Review:        RENAL FUNCTION PANEL   Collected: 10/05/22 1328  Final result  Specimen: Serum or Plasma     Sodium 136 mmol/L Creatinine 1.13 mg/dL   Potassium 4.2 mmol/L BUN/Creatinine ratio 13     Chloride 101 mmol/L eGFR 50 Low  ml/min/1.73m2    CO2 28 mmol/L Calcium 9.4 mg/dL   Anion gap 7 mmol/L Phosphorus 3.4 mg/dL   Glucose 97 mg/dL Albumin 3.9 g/dL   BUN 15 mg/dL            10/05/22 1606  PROTEIN/CREATININE RATIO, URINE   Collected: 10/05/22 1329  Final result  Specimen: Urine, random    Protein, urine random 8 mg/dL Protein/Creat. urine Ratio 0.1     Creatinine, urine random 105.00 mg/dL            10/05/22 1542  URINALYSIS W/MICROSCOPIC   Collected: 10/05/22 1328  Final result  Specimen: Urine    Color Dark Yellow    Urobilinogen 0.2 EU/dL   Appearance Clear   Nitrites Negative     Specific gravity 1.014   Leukocyte Esterase Trace Abnormal      pH (UA) 5.5   WBC 0-4 /hpf   Protein Negative mg/dL RBC 0-5 /hpf   Glucose Negative mg/dL Epithelial cells Few /lpf    Ketone Negative mg/dL Bacteria Negative Abnormal  /hpf   Bilirubin Negative   Amorphous Crystals 1+   Blood Negative                      Assessment/Plan:     1.   Acute kidney injury chronic kidney disease stage IIIa  -Creatinine is improved from 1.4-->1.0. Creatinine is stable at 1.1. This is likely her baseline  -I will continue losartan and spironolactone at current dose  -She denies any needs at home  -clinically euvolemic  -Urine is bland. Quantify proteinuria     2. Hypertension  -Blood pressure is very well controlled after changes were made at  Prior visit.    -Swelling has gone after discontinuing amlodipine  -Losartan was started instead of lisinopril because of dry cough. She has no dry cough now  -Hydrochlorothiazide was discontinued because of hypokalemia and spironolactone was decreased to 25 mg daily because of low blood pressure readings. Potassium is 4.2 on recent labs. 3.  Bilateral leg edema  -This is resolved after discontinuing non-dihydropyridine calcium channel blockers starting spironolactone     4. Chronic hypokalemia  -Was likely secondary to hydrochlorothiazide which was discontinued at the last visit and spironolactone was started  -Potassium is ok on recent labs. off potassium supplementation. Continue to hold  -Repeat labs prior to next visit     5.   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

## 2022-10-20 ENCOUNTER — HOSPITAL ENCOUNTER (OUTPATIENT)
Dept: PHYSICAL THERAPY | Age: 77
Discharge: HOME | End: 2022-10-20
Payer: MEDICARE

## 2022-10-20 PROCEDURE — 97110 THERAPEUTIC EXERCISES: CPT

## 2022-10-20 PROCEDURE — 97016 VASOPNEUMATIC DEVICE THERAPY: CPT

## 2022-10-24 ENCOUNTER — HOSPITAL ENCOUNTER (OUTPATIENT)
Dept: PHYSICAL THERAPY | Age: 77
Discharge: HOME | End: 2022-10-24
Payer: MEDICARE

## 2022-10-24 PROCEDURE — 97016 VASOPNEUMATIC DEVICE THERAPY: CPT

## 2022-10-24 PROCEDURE — 97110 THERAPEUTIC EXERCISES: CPT

## 2022-10-24 NOTE — PROGRESS NOTES
PT DAILY TREATMENT NOTE 8-    Patient Name: Kevin Batch  Date:10/24/2022  : 1945  [x]  Patient  Verified  Payor: Amelia Thompson / [place] / Product Type: Medicare /    In time:10:00  Out time:11:00  Total Treatment Time (min): 60  Total Timed Codes (min): 50  1:1 Treatment Time (min): 50  Visit #: 7    Treatment Area: Other fracture of upper and lower end of right fibula, subsequent encounter for closed fracture with routine healing [S84.693P]    SUBJECTIVE  Pt states her only difficulty is maintaining balance when walking. Pain Level (0-10 scale): 0    Any medication changes, allergies to medications, adverse drug reactions, diagnosis change, or new procedure performed?: [x] No    [] Yes (see summary sheet for update)    OBJECTIVE  Modality rationale: decrease edema, decrease inflammation, and decrease pain to improve the patients ability to optimally heal.    Min Type Additional Details    [] Estim: []Att   []Unatt  []TENS instruct                 []IFC  []Premod []NMES                       []Other:  []w/US   []w/ice   []w/heat  Position:  Location:    []  Traction: [] Cervical       []Lumbar                       [] Prone          []Supine                       []Intermittent   []Continuous Lbs:  [] before manual  [] after manual    []  Ultrasound: []Continuous   [] Pulsed                           []1MHz   []3MHz Location:  W/cm2:    []  Ice     []  heat  []  Ice massage Position:  Location:   10 [x]  Vasopneumatic Device Pressure: [x] lo [] med [] hi   Temp: [] lo [x] med [] hi   [] Skin assessment post-treatment:  []intact []redness- no adverse reaction       []redness - adverse reaction:     50 min Therapeutic Exercise:  [x] See flow sheet :   Rationale: increase ROM, increase strength, improve coordination, improve balance, and increase proprioception to improve the patients ability to restore achieve full AROM and strength.             With TE  TA   NR  GT   Misc Patient Education: [x] Review HEP    [] Progressed/Changed HEP based on:   [] positioning   [] body mechanics   [] transfers   [] heat/ice application        Pain Level (0-10 scale) post treatment: 0    ASSESSMENT/Changes in Function: Session began with stepper for active warm up. Continued balance board to challenge ankle stability and control. Followed by SLS noting improving stability with less ankle insuffiencey. Continued step ups in forward and lateral directions for functional activity endurance. All exercise documented on flow sheet. Continues to be challenged with heel walking and ankle eversion. Vaso compression applied post exercise. Will continue POC progressing as able. Patient will continue to benefit from skilled PT services to modify and progress therapeutic interventions, address functional mobility deficits, address ROM deficits, address strength deficits, analyze and address soft tissue restrictions, analyze and cue movement patterns, analyze and modify body mechanics/ergonomics, assess and modify postural abnormalities, and address imbalance/dizziness to attain remaining goals.      [x]  See Plan of Care  []  See progress note/recertification  []  See Discharge Summary         PLAN  []  Upgrade activities as tolerated     [x]  Continue plan of care  []  Update interventions per flow sheet       []  Discharge due to:_  []  Other:    Bianca Ahumada, LPTA  10/24/2022  11:00 AM

## 2022-11-01 ENCOUNTER — HOSPITAL ENCOUNTER (OUTPATIENT)
Dept: PHYSICAL THERAPY | Age: 77
Discharge: HOME | End: 2022-11-01
Payer: MEDICARE

## 2022-11-01 PROCEDURE — 97110 THERAPEUTIC EXERCISES: CPT

## 2022-11-01 PROCEDURE — 97016 VASOPNEUMATIC DEVICE THERAPY: CPT

## 2022-11-01 NOTE — PROGRESS NOTES
PT DAILY TREATMENT NOTE 8-    Patient Name: Pk Crow  Date:2022  : 1945  [x]  Patient  Verified  Payor: Nuno Sun / Plan: [place] / Product Type: Medicare /    In time:11:00  Out time:11:53  Total Treatment Time (min): 53  Total Timed Codes (min): 43  1:1 Treatment Time (min): 43  Visit #: 8    Treatment Area: Other fracture of upper and lower end of right fibula, subsequent encounter for closed fracture with routine healing [S8.015V]    SUBJECTIVE  Pt states that she is feeling that her ankle is getting stronger. Pain Level (0-10 scale): 0    Any medication changes, allergies to medications, adverse drug reactions, diagnosis change, or new procedure performed?: [x] No    [] Yes (see summary sheet for update)    OBJECTIVE  Modality rationale: decrease edema, decrease inflammation, and decrease pain to improve the patients ability to optimally heal.    Min Type Additional Details    [] Estim: []Att   []Unatt  []TENS instruct                 []IFC  []Premod []NMES                       []Other:  []w/US   []w/ice   []w/heat  Position:  Location:    []  Traction: [] Cervical       []Lumbar                       [] Prone          []Supine                       []Intermittent   []Continuous Lbs:  [] before manual  [] after manual    []  Ultrasound: []Continuous   [] Pulsed                           []1MHz   []3MHz Location:  W/cm2:    []  Ice     []  heat  []  Ice massage Position:  Location:   10 [x]  Vasopneumatic Device Pressure: [x] lo [] med [] hi   Temp: [] lo [x] med [] hi   [] Skin assessment post-treatment:  []intact []redness- no adverse reaction       []redness - adverse reaction:     43 min Therapeutic Exercise:  [x] See flow sheet :   Rationale: increase ROM, increase strength, improve coordination, improve balance, and increase proprioception to improve the patients ability to restore achieve full AROM and strength.             With TE  TA   NR  GT   Misc Patient Education: [x] Review HEP    [] Progressed/Changed HEP based on:   [] positioning   [] body mechanics   [] transfers   [] heat/ice application        Pain Level (0-10 scale) post treatment: 1    ASSESSMENT/Changes in Function: Session began with stepper for active warm up. Continued balance board to challenge ankle stability and control. Followed by SLS noting improving stability with less ankle insuffiencey. Continued step ups in forward and lateral directions for functional activity endurance. Added 2# weight resistance for ankle 4 way in seated position. All exercise documented on flow sheet. Continues to be challenged with heel walking and ankle eversion. Vaso compression applied post exercise. Will continue POC progressing as able. Patient will continue to benefit from skilled PT services to modify and progress therapeutic interventions, address functional mobility deficits, address ROM deficits, address strength deficits, analyze and address soft tissue restrictions, analyze and cue movement patterns, analyze and modify body mechanics/ergonomics, assess and modify postural abnormalities, and address imbalance/dizziness to attain remaining goals.      [x]  See Plan of Care  []  See progress note/recertification  []  See Discharge Summary         PLAN  []  Upgrade activities as tolerated     [x]  Continue plan of care  []  Update interventions per flow sheet       []  Discharge due to:_  []  Other:    KEZIA Castelan  11/1/2022  12:00 PM

## 2022-11-03 ENCOUNTER — HOSPITAL ENCOUNTER (OUTPATIENT)
Dept: PHYSICAL THERAPY | Age: 77
Discharge: HOME | End: 2022-11-03
Payer: MEDICARE

## 2022-11-03 PROCEDURE — 97016 VASOPNEUMATIC DEVICE THERAPY: CPT

## 2022-11-03 PROCEDURE — 97110 THERAPEUTIC EXERCISES: CPT

## 2022-11-03 NOTE — PROGRESS NOTES
PT DAILY TREATMENT NOTE 8-    Patient Name: Uri Willingham  Date:11/3/2022  : 1945  [x]  Patient  Verified  Payor: Winnie Nones / Plan: [place] / Product Type: Medicare /    In time:11:00  Out time:11:55  Total Treatment Time (min): 55  Total Timed Codes (min): 45  1:1 Treatment Time (min): 45  Visit #: 9    Treatment Area: Other fracture of upper and lower end of right fibula, subsequent encounter for closed fracture with routine healing [S82.450A]    SUBJECTIVE  Pt states that she is feeling that her ankle is getting stronger. No new complaints this date. Denies pain. Pain Level (0-10 scale): 0    Any medication changes, allergies to medications, adverse drug reactions, diagnosis change, or new procedure performed?: [x] No    [] Yes (see summary sheet for update)    OBJECTIVE  Modality rationale: decrease edema, decrease inflammation, and decrease pain to improve the patients ability to optimally heal.    Min Type Additional Details    [] Estim: []Att   []Unatt  []TENS instruct                 []IFC  []Premod []NMES                       []Other:  []w/US   []w/ice   []w/heat  Position:  Location:    []  Traction: [] Cervical       []Lumbar                       [] Prone          []Supine                       []Intermittent   []Continuous Lbs:  [] before manual  [] after manual    []  Ultrasound: []Continuous   [] Pulsed                           []1MHz   []3MHz Location:  W/cm2:    []  Ice     []  heat  []  Ice massage Position:  Location:   10 [x]  Vasopneumatic Device Pressure: [x] lo [] med [] hi   Temp: [] lo [x] med [] hi   [] Skin assessment post-treatment:  []intact []redness- no adverse reaction       []redness - adverse reaction:     45 min Therapeutic Exercise:  [x] See flow sheet :   Rationale: increase ROM, increase strength, improve coordination, improve balance, and increase proprioception to improve the patients ability to restore achieve full AROM and strength. With TE  TA   NR  GT   [facility] Patient Education: [x] Review HEP    [] Progressed/Changed HEP based on:   [] positioning   [] body mechanics   [] transfers   [] heat/ice application        Pain Level (0-10 scale) post treatment: 0    ASSESSMENT/Changes in Function: Session began with stepper for active warm up. Continued balance board to challenge ankle stability and control. Followed by SLS noting improving stability with less ankle insuffiencey. Challenged step ups in forward and lateral directions via use of Bosu. Continued 2# weight resistance for ankle 4 way in seated position. All exercise documented on flow sheet. Continues to be challenged with heel walking and ankle eversion. Vaso compression applied post exercise. Will continue POC  with reassessment next week with possible DC. Mica Garcia Patient will continue to benefit from skilled PT services to modify and progress therapeutic interventions, address functional mobility deficits, address ROM deficits, address strength deficits, analyze and address soft tissue restrictions, analyze and cue movement patterns, analyze and modify body mechanics/ergonomics, assess and modify postural abnormalities, and address imbalance/dizziness to attain remaining goals.      [x]  See Plan of Care  []  See progress note/recertification  []  See Discharge Summary         PLAN  []  Upgrade activities as tolerated     [x]  Continue plan of care  []  Update interventions per flow sheet       []  Discharge due to:_  []  Other:    KEZIA Newell  11/3/2022  11:58 AM

## 2022-11-07 ENCOUNTER — HOSPITAL ENCOUNTER (OUTPATIENT)
Dept: PHYSICAL THERAPY | Age: 77
Discharge: HOME | End: 2022-11-07
Payer: MEDICARE

## 2022-11-07 PROCEDURE — 97110 THERAPEUTIC EXERCISES: CPT

## 2022-11-07 PROCEDURE — 97016 VASOPNEUMATIC DEVICE THERAPY: CPT

## 2022-11-07 NOTE — PROGRESS NOTES
PT DAILY TREATMENT NOTE     Patient Name: Latonia Worthington  Date:2022  : 1945  [x]  Patient  Verified  Payor: Suad Grew / Plan: [place] / Product Type: Medicare /    In time:13:30  Out time:14:23  Total Treatment Time (min): 53  Total Timed Codes (min): 43  1:1 Treatment Time (min): 43  Visit #: 10    Treatment Area: Other fracture of upper and lower end of right fibula, subsequent encounter for closed fracture with routine healing [S86.625Q]    SUBJECTIVE  Pt states that she is feeling that her ankle is getting stronger. No new complaints this date. \"I had slight pain in my ankle yesterday and this morning. \" 'I am going to start taking the aquatic classes at the [facility]. \"    Pain Level (0-10 scale): 0    Any medication changes, allergies to medications, adverse drug reactions, diagnosis change, or new procedure performed?: [x] No    [] Yes (see summary sheet for update)    OBJECTIVE  Modality rationale: decrease edema, decrease inflammation, and decrease pain to improve the patients ability to optimally heal.    Min Type Additional Details    [] Estim: []Att   []Unatt  []TENS instruct                 []IFC  []Premod []NMES                       []Other:  []w/US   []w/ice   []w/heat  Position:  Location:    []  Traction: [] Cervical       []Lumbar                       [] Prone          []Supine                       []Intermittent   []Continuous Lbs:  [] before manual  [] after manual    []  Ultrasound: []Continuous   [] Pulsed                           []1MHz   []3MHz Location:  W/cm2:    []  Ice     []  heat  []  Ice massage Position:  Location:   10 [x]  Vasopneumatic Device Pressure: [x] lo [] med [] hi   Temp: [] lo [x] med [] hi   [] Skin assessment post-treatment:  []intact []redness- no adverse reaction       []redness - adverse reaction:     43 min Therapeutic Exercise:  [x] See flow sheet :   Rationale: increase ROM, increase strength, improve coordination, improve balance, and increase proprioception to improve the patients ability to restore achieve full AROM and strength. With TE  TA   NR  GT   [facility] Patient Education: [x] Review HEP    [] Progressed/Changed HEP based on:   [] positioning   [] body mechanics   [] transfers   [] heat/ice application        Pain Level (0-10 scale) post treatment: 0    ASSESSMENT/Changes in Function: Session began with bike for active warm up. Continued balance board to challenge ankle stability and control. Followed by SLS noting improving stability with less ankle insuffiencey. Challenged step ups in forward and lateral directions via use of Bosu. Added marching on BOSU for challenge with stability. Continued 2# weight resistance for ankle 4 way in seated position. All exercise documented on flow sheet. Continues to be challenged with heel walking and ankle eversion. Vaso compression applied post exercise. Will continue POC  with reassessment next visit with [facility] Cordoba Thomas Patient will continue to benefit from skilled PT services to modify and progress therapeutic interventions, address functional mobility deficits, address ROM deficits, address strength deficits, analyze and address soft tissue restrictions, analyze and cue movement patterns, analyze and modify body mechanics/ergonomics, assess and modify postural abnormalities, and address imbalance/dizziness to attain remaining goals.      [x]  See Plan of Care  []  See progress note/recertification  []  See Discharge Summary         PLAN  []  Upgrade activities as tolerated     [x]  Continue plan of care  []  Update interventions per flow sheet       []  Discharge due to:_  []  Other:    KEZIA De León  11/7/2022  3:45 PM

## 2022-11-09 ENCOUNTER — HOSPITAL ENCOUNTER (OUTPATIENT)
Dept: PHYSICAL THERAPY | Age: 77
Discharge: HOME | End: 2022-11-09
Payer: MEDICARE

## 2022-11-09 PROCEDURE — 97110 THERAPEUTIC EXERCISES: CPT

## 2022-11-09 PROCEDURE — 97164 PT RE-EVAL EST PLAN CARE: CPT

## 2022-11-09 NOTE — THERAPY DISCHARGE
[address] [place] [address] PHYSICAL THERAPY  [address] [place] [address] Dr HARRIS, [address] [place] [address], [facility] * Phone: [phone] * Fax: [phone]  DISCHARGE SUMMARY FOR PHYSICAL THERAPY            Patient Name: Esau Smith : 1945   Treatment/Medical Diagnosis: Other fracture of upper and lower end of right fibula, subsequent encounter for closed fracture with routine healing [S82.833Q]   Onset Date: 22    Referral Source: [facility]): 10/3/22   Prior Hospitalization: See Medical History Provider #: [id number]   Prior Level of Function: independent   Comorbidities: MELISA, arrhythmia, CAD, CA, HCL, HTN, MVP   Medications: Verified on Patient Summary List   Visits from [facility]: 11 Missed Visits: 0       Subjective:  Pt has met or is progressing toward their goals, they have improved functional mobility, and they are independent with their HEP. Objective:  FOTO: 67 (10/20/22);  57 (intake)  LEFS:  58.6 (10/20/22); 45.3 (intake)  SLS:  LLE: 30 seconds;  RLE = 9 seconds     ROM / Strength  [] Unable to assess                            AROM                         PROM                       Strength       Left Right Left Right Left Right   Hip Flexion                 Extension                 Abduction                 Adduction               Knee Flexion                 Extension               Ankle Plantarflexion 47 47     5/5 5/5     Dorsiflexion  Inversion  Eversion 14  34  22 16  32  21     5/5  5/5  5/5 5/5  5/5  5/5     Goal Status Final:   Short Term Goals: 3 weeks  Patient will report the knowledge of 3 exercises that can be used to help reduce symptoms to be able to ind reduce symptoms while at home. -MET  Patient will demonstrate a 1/2 grade improvement in R ankle MMT to be able to better ambulate with a normalized gait without pain.  -MET  Patient will demonstrate R ankle PROM improvement of 15 degrees in sagittal plane and 10 degrees in frontal plane to facilitate increased ability to ascend/descend stairs. -MET  Patient will increase LEFS > 10 points to facilitate increased ability to perform functional activities of choice. -MET     Long Term Goals: 6 weeks  Patient will demonstrate a 2 grade improvement in R ankle MMT to be able to better ambulate with a normalized gait without pain. -MET  Patient will demonstrate R ankle AROM of improvement of 20 degrees in sagittal plane and 10 degrees in frontal plane or greater to be able to return to normal ambulation on level and unlevel surfaces. -MET  Patient will demonstrate the ability to ambulate > 500 feet without an AD without evidence of antalgia to be able to return to an ind walking program following discharge. -MET  Patient will increase LEFS > 20 points to facilitate increased ability to perform leisure activities of choice. -NOT MET       Key Functional Changes/Progress: Pt has improved ROM, strength, and functional mobility    Assessments/Recommendations: Discontinue therapy. Progressing towards or have reached established goals. If you have any questions/comments please contact us directly at [phone]. Thank you for allowing us to assist in the care of your patient. Therapist Signature: Yudy Brown PT, DPT Date: 11/9/2022   Reporting Period: 10/3/22 - 11/9/22 Time: 5:05 PM          NOTE TO PHYSICIAN:  PLEASE COMPLETE THE ORDERS BELOW AND FAX TO   [facility]: [phone]. If you are unable to process this request in 24 hours please contact our office: [phone].    ___ I have read the above report and request that my patient be discharged from therapy.      Physician Signature:        Date:       Time:

## 2022-11-09 NOTE — PROGRESS NOTES
PT DAILY TREATMENT NOTE 8-    Patient Name: Niko Barber  Date:2022  : 1945  [x]  Patient  Verified  Payor: Austin Pastor / Plan: [facility] [place] [place] & B / Product Type: Medicare /    In time: 1500  Out time: 9654  Total Treatment Time (min): 35  Total Timed Codes (min): 20  1:1 Treatment Time (min): 35   Visit # 11      Treatment Area: Other fracture of upper and lower end of right fibula, subsequent encounter for closed fracture with routine healing [S82.947F]    SUBJECTIVE  Pt reports mild aching in the R ankle. She is compliant with HEP.   Pain Level (0-10 scale): 1/10  Any medication changes, allergies to medications, adverse drug reactions, diagnosis change, or new procedure performed?: [x] No    [] Yes (see summary sheet for update)        OBJECTIVE  Modality rationale:    Min Type Additional Details    [] Estim: []Att   []Unatt  []TENS instruct                 []IFC  []Premod []NMES                       []Other:  []w/US   []w/ice   []w/heat  Position:  Location:    []  Traction: [] Cervical       []Lumbar                       [] Prone          []Supine                       []Intermittent   []Continuous Lbs:  [] before manual  [] after manual    []  Ultrasound: []Continuous   [] Pulsed                           []1MHz   []3MHz Location:  W/cm2:         []  Ice     []  heat  []  Ice massage Position:  Location:    []  Vasopneumatic Device Pressure: [] lo [] med [] hi   Temp: [] lo [] med [] hi   [] Skin assessment post-treatment:  []intact []redness- no adverse reaction       []redness - adverse reaction:       20 min Therapeutic Exercise:  [x] See flow sheet :   Rationale: increase ROM and increase strength to improve the patients ability to ambulate    15 min PT Reassessment         With TE Patient Education: [x] Review HEP    [] Progressed/Changed HEP based on:   [] positioning   [] body mechanics   [] transfers   [] heat/ice application          Pain Level (0-10 scale) post treatment: 0/10    ASSESSMENT/Changes in Function:   Pt participates in PT reassessment today to assess pt progress in therapy this reporting period. Session initiated on stepper followed by self-stretching and PRE. Today's Tx session emphasized exercises per POC to increase R ankle ROM and strength with Pt tolerating Tx with no adverse effects. Pt demonstrates good recall when performing exercises during today's Tx session. Pt HEP updated and pt was educated on exercise dosing, DOMS, pain science, and safety awareness. Pt has met or is progressing toward their goals, they have improved functional mobility, and they are independent with their HEP. Patient will be DC following today's visit.      []  See Plan of Care  []  See progress note/recertification  [x]  See Discharge Summary             PLAN  []  Upgrade activities as tolerated     []  Continue plan of care  []  Update interventions per flow sheet       [x]  Discharge due to: Met/progressing toward goals  []  Other:_      Heather Braga, PT, DPT 11/9/2022  3:05 PM

## 2023-01-04 DIAGNOSIS — I10 ESSENTIAL HYPERTENSION: ICD-10-CM

## 2023-01-04 RX ORDER — SPIRONOLACTONE 25 MG/1
TABLET ORAL
Qty: 90 TABLET | Refills: 0 | Status: SHIPPED | OUTPATIENT
Start: 2023-01-04

## 2023-01-04 RX ORDER — LOSARTAN POTASSIUM 100 MG/1
TABLET ORAL
Qty: 90 TABLET | Refills: 0 | Status: SHIPPED | OUTPATIENT
Start: 2023-01-04

## 2023-02-04 DIAGNOSIS — N17.9 AKI (ACUTE KIDNEY INJURY) (HCC): ICD-10-CM

## 2023-02-04 DIAGNOSIS — N18.31 STAGE 3A CHRONIC KIDNEY DISEASE (HCC): ICD-10-CM

## 2023-02-04 DIAGNOSIS — I10 ESSENTIAL HYPERTENSION: Primary | ICD-10-CM

## 2023-02-05 DIAGNOSIS — N17.9 AKI (ACUTE KIDNEY INJURY) (HCC): ICD-10-CM

## 2023-02-05 DIAGNOSIS — N18.31 STAGE 3A CHRONIC KIDNEY DISEASE (HCC): ICD-10-CM

## 2023-02-05 DIAGNOSIS — I10 ESSENTIAL HYPERTENSION: Primary | ICD-10-CM

## 2023-02-14 DIAGNOSIS — S82.831D OTHER CLOSED FRACTURE OF DISTAL END OF RIGHT FIBULA WITH ROUTINE HEALING, SUBSEQUENT ENCOUNTER: Primary | ICD-10-CM

## 2023-02-23 ENCOUNTER — TRANSCRIBE ORDERS (OUTPATIENT)
Facility: HOSPITAL | Age: 78
End: 2023-02-23

## 2023-02-23 DIAGNOSIS — I35.0 AORTIC VALVE STENOSIS, ETIOLOGY OF CARDIAC VALVE DISEASE UNSPECIFIED: Primary | ICD-10-CM

## 2023-04-05 RX ORDER — SPIRONOLACTONE 25 MG/1
TABLET ORAL
Qty: 90 TABLET | Refills: 0 | Status: SHIPPED | OUTPATIENT
Start: 2023-04-05

## 2023-04-05 RX ORDER — LOSARTAN POTASSIUM 100 MG/1
TABLET ORAL
Qty: 90 TABLET | Refills: 0 | Status: SHIPPED | OUTPATIENT
Start: 2023-04-05

## 2023-04-07 ENCOUNTER — HOSPITAL ENCOUNTER (OUTPATIENT)
Age: 78
End: 2023-04-07
Payer: MEDICARE

## 2023-04-07 DIAGNOSIS — N18.31 STAGE 3A CHRONIC KIDNEY DISEASE (HCC): ICD-10-CM

## 2023-04-07 DIAGNOSIS — I10 ESSENTIAL HYPERTENSION: ICD-10-CM

## 2023-04-07 DIAGNOSIS — N17.9 AKI (ACUTE KIDNEY INJURY) (HCC): ICD-10-CM

## 2023-04-07 LAB
ALBUMIN SERPL-MCNC: 4 G/DL (ref 3.4–5)
ANION GAP SERPL CALC-SCNC: 8 MMOL/L (ref 3–18)
BASOPHILS # BLD: 0.1 K/UL (ref 0–0.1)
BASOPHILS NFR BLD: 1 % (ref 0–2)
BUN SERPL-MCNC: 15 MG/DL (ref 7–18)
BUN/CREAT SERPL: 13 (ref 12–20)
CA-I BLD-MCNC: 9.8 MG/DL (ref 8.5–10.1)
CHLORIDE SERPL-SCNC: 100 MMOL/L (ref 100–111)
CO2 SERPL-SCNC: 26 MMOL/L (ref 21–32)
CREAT SERPL-MCNC: 1.15 MG/DL (ref 0.6–1.3)
DIFFERENTIAL METHOD BLD: ABNORMAL
EOSINOPHIL # BLD: 0.1 K/UL (ref 0–0.4)
EOSINOPHIL NFR BLD: 1 % (ref 0–5)
ERYTHROCYTE [DISTWIDTH] IN BLOOD BY AUTOMATED COUNT: 12.3 % (ref 11.6–14.5)
GLUCOSE SERPL-MCNC: 123 MG/DL (ref 74–99)
HCT VFR BLD AUTO: 36.6 % (ref 35–45)
HGB BLD-MCNC: 12.6 G/DL (ref 12–16)
IMM GRANULOCYTES # BLD AUTO: 0 K/UL (ref 0–0.04)
IMM GRANULOCYTES NFR BLD AUTO: 0 % (ref 0–0.5)
LYMPHOCYTES # BLD: 1.5 K/UL (ref 0.9–3.6)
LYMPHOCYTES NFR BLD: 21 % (ref 21–52)
MCH RBC QN AUTO: 30.1 PG (ref 24–34)
MCHC RBC AUTO-ENTMCNC: 34.4 G/DL (ref 31–37)
MCV RBC AUTO: 87.6 FL (ref 78–100)
MONOCYTES # BLD: 0.6 K/UL (ref 0.05–1.2)
MONOCYTES NFR BLD: 9 % (ref 3–10)
NEUTS SEG # BLD: 4.8 K/UL (ref 1.8–8)
NEUTS SEG NFR BLD: 68 % (ref 40–73)
NRBC # BLD: 0 K/UL (ref 0–0.01)
NRBC BLD-RTO: 0 PER 100 WBC
PHOSPHATE SERPL-MCNC: 2.9 MG/DL (ref 2.5–4.9)
PLATELET # BLD AUTO: 321 K/UL (ref 135–420)
PMV BLD AUTO: 9.7 FL (ref 9.2–11.8)
POTASSIUM SERPL-SCNC: 3.7 MMOL/L (ref 3.5–5.5)
RBC # BLD AUTO: 4.18 M/UL (ref 4.2–5.3)
SODIUM SERPL-SCNC: 134 MMOL/L (ref 136–145)
WBC # BLD AUTO: 7 K/UL (ref 4.6–13.2)

## 2023-04-07 PROCEDURE — 83970 ASSAY OF PARATHORMONE: CPT

## 2023-04-07 PROCEDURE — 85025 COMPLETE CBC W/AUTO DIFF WBC: CPT

## 2023-04-07 PROCEDURE — 80069 RENAL FUNCTION PANEL: CPT

## 2023-04-07 PROCEDURE — 36415 COLL VENOUS BLD VENIPUNCTURE: CPT

## 2023-04-08 LAB
CA-I BLD-MCNC: 9.8 MG/DL (ref 8.5–10.1)
PTH-INTACT SERPL-MCNC: 34.2 PG/ML (ref 18.4–88)

## 2023-04-19 DIAGNOSIS — I10 ESSENTIAL HYPERTENSION: ICD-10-CM

## 2023-04-19 DIAGNOSIS — N17.9 AKI (ACUTE KIDNEY INJURY) (HCC): ICD-10-CM

## 2023-04-19 DIAGNOSIS — N18.31 STAGE 3A CHRONIC KIDNEY DISEASE (HCC): ICD-10-CM

## 2023-04-24 ENCOUNTER — OFFICE VISIT (OUTPATIENT)
Dept: FAMILY MEDICINE CLINIC | Facility: CLINIC | Age: 78
End: 2023-04-24
Payer: MEDICARE

## 2023-04-24 ENCOUNTER — HOSPITAL ENCOUNTER (OUTPATIENT)
Age: 78
Discharge: HOME | End: 2023-04-27
Payer: MEDICARE

## 2023-04-24 DIAGNOSIS — C92.10 CHRONIC MYELOID LEUKEMIA, BCR/ABL-POSITIVE, NOT HAVING ACHIEVED REMISSION (HCC): ICD-10-CM

## 2023-04-24 DIAGNOSIS — E53.8 B12 DEFICIENCY: ICD-10-CM

## 2023-04-24 DIAGNOSIS — D47.1 MYELOPROLIFERATIVE DISORDER (HCC): ICD-10-CM

## 2023-04-24 DIAGNOSIS — E55.9 VITAMIN D DEFICIENCY: ICD-10-CM

## 2023-04-24 DIAGNOSIS — E78.2 MIXED HYPERLIPIDEMIA: ICD-10-CM

## 2023-04-24 DIAGNOSIS — I10 ESSENTIAL (PRIMARY) HYPERTENSION: ICD-10-CM

## 2023-04-24 DIAGNOSIS — Z00.00 MEDICARE ANNUAL WELLNESS VISIT, SUBSEQUENT: Primary | ICD-10-CM

## 2023-04-24 DIAGNOSIS — E80.4 GILBERT'S SYNDROME: ICD-10-CM

## 2023-04-24 PROBLEM — F11.99 OPIOID USE, UNSPECIFIED WITH UNSPECIFIED OPIOID-INDUCED DISORDER (HCC): Status: RESOLVED | Noted: 2022-08-23 | Resolved: 2023-04-24

## 2023-04-24 LAB
ALBUMIN SERPL-MCNC: 4 G/DL (ref 3.4–5)
ALBUMIN/GLOB SERPL: 1 (ref 0.8–1.7)
ALP SERPL-CCNC: 87 U/L (ref 45–117)
ALT SERPL-CCNC: 41 U/L (ref 13–56)
ANION GAP SERPL CALC-SCNC: 6 MMOL/L (ref 3–18)
AST SERPL W P-5'-P-CCNC: 30 U/L (ref 10–38)
BILIRUB SERPL-MCNC: 1.4 MG/DL (ref 0.2–1)
BUN SERPL-MCNC: 12 MG/DL (ref 7–18)
BUN/CREAT SERPL: 11 (ref 12–20)
CA-I BLD-MCNC: 9.3 MG/DL (ref 8.5–10.1)
CHLORIDE SERPL-SCNC: 100 MMOL/L (ref 100–111)
CO2 SERPL-SCNC: 28 MMOL/L (ref 21–32)
CREAT SERPL-MCNC: 1.1 MG/DL (ref 0.6–1.3)
GLOBULIN SER CALC-MCNC: 4 G/DL (ref 2–4)
GLUCOSE SERPL-MCNC: 108 MG/DL (ref 74–99)
POTASSIUM SERPL-SCNC: 4.1 MMOL/L (ref 3.5–5.5)
PROT SERPL-MCNC: 8 G/DL (ref 6.4–8.2)
SODIUM SERPL-SCNC: 134 MMOL/L (ref 136–145)
TSH SERPL DL<=0.05 MIU/L-ACNC: 2.36 UIU/ML (ref 0.36–3.74)

## 2023-04-24 PROCEDURE — 84443 ASSAY THYROID STIM HORMONE: CPT

## 2023-04-24 PROCEDURE — 80061 LIPID PANEL: CPT

## 2023-04-24 PROCEDURE — 3077F SYST BP >= 140 MM HG: CPT | Performed by: NURSE PRACTITIONER

## 2023-04-24 PROCEDURE — 3078F DIAST BP <80 MM HG: CPT | Performed by: NURSE PRACTITIONER

## 2023-04-24 PROCEDURE — 1123F ACP DISCUSS/DSCN MKR DOCD: CPT | Performed by: NURSE PRACTITIONER

## 2023-04-24 PROCEDURE — 1090F PRES/ABSN URINE INCON ASSESS: CPT | Performed by: NURSE PRACTITIONER

## 2023-04-24 PROCEDURE — 83036 HEMOGLOBIN GLYCOSYLATED A1C: CPT

## 2023-04-24 PROCEDURE — G0439 PPPS, SUBSEQ VISIT: HCPCS | Performed by: NURSE PRACTITIONER

## 2023-04-24 PROCEDURE — 82607 VITAMIN B-12: CPT

## 2023-04-24 PROCEDURE — 80053 COMPREHEN METABOLIC PANEL: CPT

## 2023-04-24 PROCEDURE — 36415 COLL VENOUS BLD VENIPUNCTURE: CPT

## 2023-04-24 PROCEDURE — G8417 CALC BMI ABV UP PARAM F/U: HCPCS | Performed by: NURSE PRACTITIONER

## 2023-04-24 PROCEDURE — 99214 OFFICE O/P EST MOD 30 MIN: CPT | Performed by: NURSE PRACTITIONER

## 2023-04-24 PROCEDURE — 82306 VITAMIN D 25 HYDROXY: CPT

## 2023-04-24 PROCEDURE — G8400 PT W/DXA NO RESULTS DOC: HCPCS | Performed by: NURSE PRACTITIONER

## 2023-04-24 PROCEDURE — G8427 DOCREV CUR MEDS BY ELIG CLIN: HCPCS | Performed by: NURSE PRACTITIONER

## 2023-04-24 PROCEDURE — 4004F PT TOBACCO SCREEN RCVD TLK: CPT | Performed by: NURSE PRACTITIONER

## 2023-04-24 SDOH — ECONOMIC STABILITY: HOUSING INSECURITY
IN THE LAST 12 MONTHS, WAS THERE A TIME WHEN YOU DID NOT HAVE A STEADY PLACE TO SLEEP OR SLEPT IN A SHELTER (INCLUDING NOW)?: NO

## 2023-04-24 SDOH — ECONOMIC STABILITY: FOOD INSECURITY: WITHIN THE PAST 12 MONTHS, THE FOOD YOU BOUGHT JUST DIDN'T LAST AND YOU DIDN'T HAVE MONEY TO GET MORE.: NEVER TRUE

## 2023-04-24 SDOH — ECONOMIC STABILITY: INCOME INSECURITY: HOW HARD IS IT FOR YOU TO PAY FOR THE VERY BASICS LIKE FOOD, HOUSING, MEDICAL CARE, AND HEATING?: NOT HARD AT ALL

## 2023-04-24 SDOH — ECONOMIC STABILITY: FOOD INSECURITY: WITHIN THE PAST 12 MONTHS, YOU WORRIED THAT YOUR FOOD WOULD RUN OUT BEFORE YOU GOT MONEY TO BUY MORE.: NEVER TRUE

## 2023-04-24 ASSESSMENT — PATIENT HEALTH QUESTIONNAIRE - PHQ9
2. FEELING DOWN, DEPRESSED OR HOPELESS: 0
SUM OF ALL RESPONSES TO PHQ QUESTIONS 1-9: 0
SUM OF ALL RESPONSES TO PHQ QUESTIONS 1-9: 0
1. LITTLE INTEREST OR PLEASURE IN DOING THINGS: 0
SUM OF ALL RESPONSES TO PHQ9 QUESTIONS 1 & 2: 0
SUM OF ALL RESPONSES TO PHQ QUESTIONS 1-9: 0
SUM OF ALL RESPONSES TO PHQ QUESTIONS 1-9: 0

## 2023-04-24 ASSESSMENT — ENCOUNTER SYMPTOMS
SHORTNESS OF BREATH: 0
CHEST TIGHTNESS: 0

## 2023-04-24 ASSESSMENT — LIFESTYLE VARIABLES
HOW OFTEN DO YOU HAVE A DRINK CONTAINING ALCOHOL: NEVER
HOW MANY STANDARD DRINKS CONTAINING ALCOHOL DO YOU HAVE ON A TYPICAL DAY: PATIENT DOES NOT DRINK

## 2023-04-24 NOTE — PROGRESS NOTES
Eliot Freedman is a 68 y.o. female presents with   Chief Complaint   Patient presents with    Medicare AWV   54-year-old female presents today in office for routine yearly follow-up and [facility] wellness exam.  Patient has history significant for leukemia followed by oncology. She recently had 2 melanoma spots removed by dermatology. She had cardiac cath a couple of months ago which showed no overtly concerning findings however she does state that she has a \"valve that is collapsing \". She has echo scheduled for next month. She is seeing consistently by cardio. She has essential hypertension with blood pressure elevated in the office today however she states she is only have one of her blood pressure medications this morning. BP (!) 156/76 (Site: Left Upper Arm, Position: Sitting, Cuff Size: Medium Adult)   Pulse 68   Temp 97.8 °F (36.6 °C) (Temporal)   Resp 18   Ht 5' 5\" (1.651 m)   Wt 172 lb 3.2 oz (78.1 kg)   SpO2 95%   BMI 28.66 kg/m²   Subjective:     Past Medical History:   Diagnosis Date    Acute kidney injury ([address]     Ankle fracture 08/12/2022    right    Aortic valve calcification     Arrhythmia     Arthritis     bilateral thumbs    CAD (coronary artery disease) Need heart valve repleace    Cancer (HCC)     Leukemia     Hypercholesterolemia     Hypertension     MVP (mitral valve prolapse)      Past Surgical History:   Procedure Laterality Date    CATARACT REMOVAL  2008    CATARACT REMOVAL  2013    COLONOSCOPY  2008    1 polyp     COLONOSCOPY  2013    normal repeat 2018    MALIGNANT SKIN LESION EXCISION      TUBAL LIGATION       Social History     Socioeconomic History    Marital status:       Spouse name: None    Number of children: None    Years of education: None    Highest education level: None   Tobacco Use    Smoking status: Never    Smokeless tobacco: Never   Vaping Use    Vaping Use: Never used   Substance and Sexual Activity    Alcohol use: Not Currently     Alcohol/week: 0.0

## 2023-04-24 NOTE — PROGRESS NOTES
Munira Lowery presents today for   Chief Complaint   Patient presents with    Medicare AWV       Is someone accompanying this pt? no    Is the patient using any DME equipment during OV? no    Depression Screening:  PHQ-9 Questionaire 4/24/2023 10/19/2022 8/23/2022 4/19/2022 4/14/2022 10/7/2021 9/7/2021   Little interest or pleasure in doing things 0 0 0 0 0 0 0   Feeling down, depressed, or hopeless 0 0 0 0 0 0 0   PHQ-9 Total Score 0 0 0 0 0 0 0       Fall Risk  Fall Risk 4/24/2023   2 or more falls in past year? no   Fall with injury in past year? yes        Health Maintenance reviewed and discussed and ordered per Provider. Health Maintenance Due   Topic Date Due    COVID-19 Vaccine (1) Never done    DTaP/Tdap/Td vaccine (1 - Tdap) Never done    DEXA (modify frequency per FRAX score)  Never done    Shingles vaccine (2 of 3) 02/26/2013    Lipids  04/19/2023   . Coordination of Care:    1. \"Have you been to the ER, urgent care clinic since your last visit? Hospitalized since your last visit? \" No    2. \"Have you seen or consulted any other health care providers outside of the [address] [place] [address] since your last visit? \" No     3. For patients aged 39-70: Has the patient had a colonoscopy / FIT/ Cologuard? NA - based on age      If the patient is female:    4. For patients aged 41-77: Has the patient had a mammogram within the past 2 years? NA - based on age or sex      11. For patients aged 21-65: Has the patient had a pap smear?  NA - based on age or sex

## 2023-04-25 LAB
25(OH)D3 SERPL-MCNC: 60.4 NG/ML (ref 30–100)
CHOLEST SERPL-MCNC: 188 MG/DL
EST. AVERAGE GLUCOSE BLD GHB EST-MCNC: 103 MG/DL
HBA1C MFR BLD: 5.2 % (ref 4.2–5.6)
HDLC SERPL-MCNC: 78 MG/DL (ref 40–60)
HDLC SERPL: 2.4 (ref 0–5)
LDLC SERPL CALC-MCNC: 92.8 MG/DL (ref 0–100)
LIPID PANEL: ABNORMAL
TRIGL SERPL-MCNC: 86 MG/DL
VIT B12 SERPL-MCNC: >2000 PG/ML (ref 211–911)
VLDLC SERPL CALC-MCNC: 17.2 MG/DL

## 2023-04-26 ENCOUNTER — OFFICE VISIT (OUTPATIENT)
Facility: CLINIC | Age: 78
End: 2023-04-26
Payer: MEDICARE

## 2023-04-26 VITALS
BODY MASS INDEX: 28.89 KG/M2 | OXYGEN SATURATION: 92 % | DIASTOLIC BLOOD PRESSURE: 67 MMHG | SYSTOLIC BLOOD PRESSURE: 146 MMHG | HEART RATE: 70 BPM | HEIGHT: 65 IN | WEIGHT: 173.4 LBS

## 2023-04-26 DIAGNOSIS — I10 ESSENTIAL (PRIMARY) HYPERTENSION: Primary | ICD-10-CM

## 2023-04-26 DIAGNOSIS — N17.1 ACUTE RENAL FAILURE WITH ACUTE CORTICAL NECROSIS (HCC): ICD-10-CM

## 2023-04-26 DIAGNOSIS — N18.31 CHRONIC KIDNEY DISEASE, STAGE 3A (HCC): ICD-10-CM

## 2023-04-26 PROCEDURE — G8417 CALC BMI ABV UP PARAM F/U: HCPCS | Performed by: INTERNAL MEDICINE

## 2023-04-26 PROCEDURE — 1036F TOBACCO NON-USER: CPT | Performed by: INTERNAL MEDICINE

## 2023-04-26 PROCEDURE — G8400 PT W/DXA NO RESULTS DOC: HCPCS | Performed by: INTERNAL MEDICINE

## 2023-04-26 PROCEDURE — 3077F SYST BP >= 140 MM HG: CPT | Performed by: INTERNAL MEDICINE

## 2023-04-26 PROCEDURE — 3078F DIAST BP <80 MM HG: CPT | Performed by: INTERNAL MEDICINE

## 2023-04-26 PROCEDURE — 99213 OFFICE O/P EST LOW 20 MIN: CPT | Performed by: INTERNAL MEDICINE

## 2023-04-26 PROCEDURE — 1090F PRES/ABSN URINE INCON ASSESS: CPT | Performed by: INTERNAL MEDICINE

## 2023-04-26 PROCEDURE — 1123F ACP DISCUSS/DSCN MKR DOCD: CPT | Performed by: INTERNAL MEDICINE

## 2023-04-26 PROCEDURE — G8427 DOCREV CUR MEDS BY ELIG CLIN: HCPCS | Performed by: INTERNAL MEDICINE

## 2023-04-26 NOTE — PROGRESS NOTES
Progress  Notes by Simone Alvarez MD at 10/19/22 1000                    Author: Simone Alvarez MD  Service: --  Author Type: Physician       Filed: 10/19/22 1426  Encounter Date: 10/19/2022  Status: Signed                                                       Renal Progress Note             Daily Progress Note: 10/19/2022             Subjective: The patient is seen for follow  up of CKD. HPI : Patient is here for a follow-up. Denies any complaints today. Denies any shortness of breath or swelling in the legs. Denies any chest pain. Denies any dysuria   Blood pressure is very well controlled in the office today   Creatinine stable at 1.1         Medications reviewed       Current Outpatient Medications       Medication                                No current facility-administered medications for this visit. Review of Systems:        Constitutional: Negative for chills and fever. HENT: Negative. Eyes: Negative. Respiratory: Negative. Cardiovascular: Negative. Gastrointestinal: Negative for abdominal pain and nausea. Skin: Negative. Neurological: Negative. Objective:     Physical Exam:       Visit Vitals        BP  129/74 (BP 1 Location: Right arm, BP Patient Position: Sitting, BP Cuff Size: Adult)     Pulse  70     Ht  5' 5\" (1.651 m)     Wt  77.8 kg (171 lb 9.6 oz)     SpO2  97%        BMI                          General:    Alert, cooperative, no distress, appears stated age.        Lungs:         Chest wall:        Heart:        Abdomen:      Extremities:       Neurologic:            Data Review:             Vitamin D 25 Hydroxy   Collected: 04/24/23 1314  Final result  Specimen: Blood Serum     Vit D, 25-Hydroxy 60.4 ng/mL             04/25/23 1205  Hemoglobin A1C   Collected: 04/24/23 1314  Final result  Specimen: WHOLE BLOOD    Hemoglobin A1C 5.2 %  eAG 103 mg/dL           04/25/23 1134  Lipid Panel   Collected: 04/24/23 1314  Final

## 2023-04-26 NOTE — PROGRESS NOTES
Vivian Zhao presents today for a follow up. Chief Complaint   Patient presents with    Follow-up     Essential HTN       Is someone accompanying this pt? NO    Is the patient using any DME equipment during OV? NO    Depression Screening:  No flowsheet data found. Learning Assessment:  No flowsheet data found. Abuse Screening:  No flowsheet data found. Fall Risk  No flowsheet data found. ADL  No flowsheet data found. Health Maintenance reviewed and discussed and ordered per Provider. Health Maintenance Due   Topic Date Due    COVID-19 Vaccine (1) Never done    DTaP/Tdap/Td vaccine (1 - Tdap) Never done    DEXA (modify frequency per FRAX score)  Never done    Shingles vaccine (1 of 2) 02/26/2013   . Coordination of Care:  1. Have you been to the ER, urgent care clinic since your last visit? Hospitalized since your last visit? Yes, Broke her ankle in August and a Heart Cath in July. 2. Have you seen or consulted any other health care providers outside of the [address] [place] [address] since your last visit? Include any pap smears or colon screening.  NO

## 2023-04-27 ENCOUNTER — TELEPHONE (OUTPATIENT)
Dept: FAMILY MEDICINE CLINIC | Facility: CLINIC | Age: 78
End: 2023-04-27

## 2023-04-28 VITALS
SYSTOLIC BLOOD PRESSURE: 139 MMHG | DIASTOLIC BLOOD PRESSURE: 73 MMHG | TEMPERATURE: 97.8 F | HEART RATE: 68 BPM | HEIGHT: 65 IN | WEIGHT: 172.2 LBS | BODY MASS INDEX: 28.69 KG/M2 | RESPIRATION RATE: 18 BRPM | OXYGEN SATURATION: 95 %

## 2023-04-28 NOTE — PATIENT INSTRUCTIONS
important vitamins and minerals. High-fiber foods include whole-grain cereals and breads, oatmeal, beans, brown rice, citrus fruits, and apples.     Eat lean proteins. Heart-healthy proteins include seafood, lean meats and poultry, eggs, beans, peas, nuts, seeds, and soy products.     Limit drinks and foods with added sugar. These include candy, desserts, and soda pop. Lifestyle changes    If your doctor recommends it, get more exercise. Walking is a good choice. Bit by bit, increase the amount you walk every day. Try for at least 30 minutes on most days of the week. You also may want to swim, bike, or do other activities.     Do not smoke. If you need help quitting, talk to your doctor about stop-smoking programs and medicines. These can increase your chances of quitting for good. Quitting smoking may be the most important step you can take to protect your heart. It is never too late to quit.     Limit alcohol to 2 drinks a day for men and 1 drink a day for women. Too much alcohol can cause health problems.     Manage other health problems such as diabetes, high blood pressure, and high cholesterol. If you think you may have a problem with alcohol or drug use, talk to your doctor. Medicines    Take your medicines exactly as prescribed. Call your doctor if you think you are having a problem with your medicine.     If your doctor recommends aspirin, take the amount directed each day. Make sure you take aspirin and not another kind of pain reliever, such as acetaminophen (Tylenol). When should you call for help? Call 911 if you have symptoms of a heart attack. These may include:    Chest pain or pressure, or a strange feeling in the chest.     Sweating.     Shortness of breath.     Pain, pressure, or a strange feeling in the back, neck, jaw, or upper belly or in one or both shoulders or arms.     Lightheadedness or sudden weakness.     A fast or irregular heartbeat.    After you call 911, the  may tell

## 2023-05-12 ENCOUNTER — HOSPITAL ENCOUNTER (OUTPATIENT)
Age: 78
Discharge: HOME | End: 2023-05-12
Payer: MEDICARE

## 2023-05-12 VITALS
SYSTOLIC BLOOD PRESSURE: 158 MMHG | DIASTOLIC BLOOD PRESSURE: 78 MMHG | HEIGHT: 65 IN | BODY MASS INDEX: 28.82 KG/M2 | WEIGHT: 173 LBS

## 2023-05-12 DIAGNOSIS — I35.0 AORTIC VALVE STENOSIS, ETIOLOGY OF CARDIAC VALVE DISEASE UNSPECIFIED: ICD-10-CM

## 2023-05-12 LAB
ECHO AO ASC DIAM: 3.5 CM
ECHO AO ASCENDING AORTA INDEX: 1.88 CM/M2
ECHO AO ROOT DIAM: 3 CM
ECHO AO ROOT INDEX: 1.61 CM/M2
ECHO AR MAX VEL PISA: 3.8 M/S
ECHO AV AREA PEAK VELOCITY: 1.2 CM2
ECHO AV AREA VTI: 1.1 CM2
ECHO AV AREA/BSA PEAK VELOCITY: 0.6 CM2/M2
ECHO AV AREA/BSA VTI: 0.6 CM2/M2
ECHO AV MEAN GRADIENT: 35 MMHG
ECHO AV MEAN VELOCITY: 2.8 M/S
ECHO AV PEAK GRADIENT: 56 MMHG
ECHO AV PEAK VELOCITY: 3.7 M/S
ECHO AV REGURGITANT PHT: 708.7 MILLISECOND
ECHO AV VELOCITY RATIO: 0.41
ECHO AV VTI: 91.1 CM
ECHO BSA: 1.9 M2
ECHO EST RA PRESSURE: 3 MMHG
ECHO LA DIAMETER INDEX: 2.15 CM/M2
ECHO LA DIAMETER: 4 CM
ECHO LA TO AORTIC ROOT RATIO: 1.33
ECHO LA VOL 2C: 60 ML (ref 22–52)
ECHO LA VOL 4C: 61 ML (ref 22–52)
ECHO LA VOL BP: 60 ML (ref 22–52)
ECHO LA VOL/BSA BIPLANE: 32 ML/M2 (ref 16–34)
ECHO LA VOLUME AREA LENGTH: 63 ML
ECHO LA VOLUME INDEX A2C: 32 ML/M2 (ref 16–34)
ECHO LA VOLUME INDEX A4C: 33 ML/M2 (ref 16–34)
ECHO LA VOLUME INDEX AREA LENGTH: 34 ML/M2 (ref 16–34)
ECHO LV E' LATERAL VELOCITY: 10 CM/S
ECHO LV E' SEPTAL VELOCITY: 8 CM/S
ECHO LV FRACTIONAL SHORTENING: 43 % (ref 28–44)
ECHO LV INTERNAL DIMENSION DIASTOLE INDEX: 2.26 CM/M2
ECHO LV INTERNAL DIMENSION DIASTOLIC: 4.2 CM (ref 3.9–5.3)
ECHO LV INTERNAL DIMENSION SYSTOLIC INDEX: 1.29 CM/M2
ECHO LV INTERNAL DIMENSION SYSTOLIC: 2.4 CM
ECHO LV IVSD: 1.1 CM (ref 0.6–0.9)
ECHO LV MASS 2D: 167.4 G (ref 67–162)
ECHO LV MASS INDEX 2D: 90 G/M2 (ref 43–95)
ECHO LV POSTERIOR WALL DIASTOLIC: 1.2 CM (ref 0.6–0.9)
ECHO LV RELATIVE WALL THICKNESS RATIO: 0.57
ECHO LVOT AREA: 3.1 CM2
ECHO LVOT AV VTI INDEX: 0.35
ECHO LVOT DIAM: 2 CM
ECHO LVOT MEAN GRADIENT: 5 MMHG
ECHO LVOT PEAK GRADIENT: 8 MMHG
ECHO LVOT PEAK VELOCITY: 1.5 M/S
ECHO LVOT STROKE VOLUME INDEX: 54.5 ML/M2
ECHO LVOT SV: 101.4 ML
ECHO LVOT VTI: 32.3 CM
ECHO MV A VELOCITY: 1.41 M/S
ECHO MV AREA PHT: 3 CM2
ECHO MV AREA VTI: 2.9 CM2
ECHO MV E DECELERATION TIME (DT): 325 MS
ECHO MV E VELOCITY: 1.04 M/S
ECHO MV E/A RATIO: 0.74
ECHO MV E/E' LATERAL: 10.4
ECHO MV E/E' RATIO (AVERAGED): 11.7
ECHO MV E/E' SEPTAL: 13
ECHO MV LVOT VTI INDEX: 1.09
ECHO MV MAX VELOCITY: 1.2 M/S
ECHO MV MEAN GRADIENT: 2 MMHG
ECHO MV MEAN VELOCITY: 0.7 M/S
ECHO MV PEAK GRADIENT: 6 MMHG
ECHO MV PRESSURE HALF TIME (PHT): 73.1 MS
ECHO MV VTI: 35.1 CM
ECHO PV MAX VELOCITY: 1.5 M/S
ECHO PV MEAN GRADIENT: 5 MMHG
ECHO PV MEAN VELOCITY: 1 M/S
ECHO PV PEAK GRADIENT: 10 MMHG
ECHO RA END SYSTOLIC VOLUME APICAL 4 CHAMBER INDEX BSA: 19 ML/M2
ECHO RA VOLUME BIPLANE METHOD OF DISKS: 36 ML
ECHO RA VOLUME INDEX BP: 19 ML/M2
ECHO RA VOLUME: 35 ML
ECHO RIGHT VENTRICULAR SYSTOLIC PRESSURE (RVSP): 38 MMHG
ECHO RV TAPSE: 1.9 CM (ref 1.7–?)
ECHO TV REGURGITANT MAX VELOCITY: 2.97 M/S
ECHO TV REGURGITANT PEAK GRADIENT: 35 MMHG

## 2023-05-12 PROCEDURE — 93306 TTE W/DOPPLER COMPLETE: CPT

## 2023-07-06 RX ORDER — LOSARTAN POTASSIUM 100 MG/1
TABLET ORAL
Qty: 90 TABLET | Refills: 0 | Status: SHIPPED | OUTPATIENT
Start: 2023-07-06

## 2023-07-06 RX ORDER — SPIRONOLACTONE 25 MG/1
TABLET ORAL
Qty: 90 TABLET | Refills: 0 | Status: SHIPPED | OUTPATIENT
Start: 2023-07-06

## 2023-07-11 ENCOUNTER — ANESTHESIA EVENT (OUTPATIENT)
Facility: HOSPITAL | Age: 78
End: 2023-07-11

## 2023-07-11 ENCOUNTER — ANESTHESIA (OUTPATIENT)
Facility: HOSPITAL | Age: 78
End: 2023-07-11

## 2023-07-11 ENCOUNTER — HOSPITAL ENCOUNTER (OUTPATIENT)
Facility: HOSPITAL | Age: 78
Discharge: HOME | End: 2023-07-13
Attending: STUDENT IN AN ORGANIZED HEALTH CARE EDUCATION/TRAINING PROGRAM | Admitting: STUDENT IN AN ORGANIZED HEALTH CARE EDUCATION/TRAINING PROGRAM
Payer: MEDICARE

## 2023-07-11 VITALS
WEIGHT: 168 LBS | DIASTOLIC BLOOD PRESSURE: 79 MMHG | BODY MASS INDEX: 27.99 KG/M2 | OXYGEN SATURATION: 97 % | TEMPERATURE: 97.8 F | RESPIRATION RATE: 16 BRPM | HEIGHT: 65 IN | SYSTOLIC BLOOD PRESSURE: 141 MMHG | HEART RATE: 84 BPM

## 2023-07-11 DIAGNOSIS — I35.0 AORTIC STENOSIS: ICD-10-CM

## 2023-07-11 LAB
ALBUMIN SERPL-MCNC: 4.2 G/DL (ref 3.5–5)
ALBUMIN/GLOB SERPL: 1.2 (ref 1.1–2.2)
ALP SERPL-CCNC: 89 U/L (ref 45–117)
ALT SERPL-CCNC: 42 U/L (ref 12–78)
ANION GAP SERPL CALC-SCNC: 6 MMOL/L (ref 5–15)
APTT PPP: 30.4 SEC (ref 21.2–34.1)
AST SERPL W P-5'-P-CCNC: 30 U/L (ref 15–37)
BILIRUB SERPL-MCNC: 0.9 MG/DL (ref 0.2–1)
BUN SERPL-MCNC: 15 MG/DL (ref 6–20)
BUN/CREAT SERPL: 13 (ref 12–20)
CA-I BLD-MCNC: 9.9 MG/DL (ref 8.5–10.1)
CHLORIDE SERPL-SCNC: 103 MMOL/L (ref 97–108)
CO2 SERPL-SCNC: 24 MMOL/L (ref 21–32)
CREAT SERPL-MCNC: 1.15 MG/DL (ref 0.55–1.02)
ECHO TV REGURGITANT MAX VELOCITY: 11.55 M/S
ECHO TV REGURGITANT PEAK GRADIENT: 534 MMHG
ERYTHROCYTE [DISTWIDTH] IN BLOOD BY AUTOMATED COUNT: 11.7 % (ref 11.5–14.5)
GLOBULIN SER CALC-MCNC: 3.4 G/DL (ref 2–4)
GLUCOSE SERPL-MCNC: 112 MG/DL (ref 65–100)
HCT VFR BLD AUTO: 40.5 % (ref 35–47)
HGB BLD-MCNC: 14 G/DL (ref 11.5–16)
INR PPP: 1 (ref 0.9–1.1)
MCH RBC QN AUTO: 29.4 PG (ref 26–34)
MCHC RBC AUTO-ENTMCNC: 34.6 G/DL (ref 30–36.5)
MCV RBC AUTO: 84.9 FL (ref 80–99)
NRBC # BLD: 0 K/UL (ref 0–0.01)
NRBC BLD-RTO: 0 PER 100 WBC
PLATELET # BLD AUTO: 321 K/UL (ref 150–400)
PMV BLD AUTO: 9.3 FL (ref 8.9–12.9)
POTASSIUM SERPL-SCNC: 4.2 MMOL/L (ref 3.5–5.1)
PROT SERPL-MCNC: 7.6 G/DL (ref 6.4–8.2)
PROTHROMBIN TIME: 13.7 SEC (ref 11.9–14.6)
RBC # BLD AUTO: 4.77 M/UL (ref 3.8–5.2)
SODIUM SERPL-SCNC: 133 MMOL/L (ref 136–145)
THERAPEUTIC RANGE: NORMAL SEC (ref 82–109)
WBC # BLD AUTO: 9.7 K/UL (ref 3.6–11)

## 2023-07-11 PROCEDURE — 3700000000 HC ANESTHESIA ATTENDED CARE

## 2023-07-11 PROCEDURE — 85610 PROTHROMBIN TIME: CPT

## 2023-07-11 PROCEDURE — 3700000001 HC ADD 15 MINUTES (ANESTHESIA)

## 2023-07-11 PROCEDURE — 85027 COMPLETE CBC AUTOMATED: CPT

## 2023-07-11 PROCEDURE — 80053 COMPREHEN METABOLIC PANEL: CPT

## 2023-07-11 PROCEDURE — 85730 THROMBOPLASTIN TIME PARTIAL: CPT

## 2023-07-11 PROCEDURE — 7100000010 HC PHASE II RECOVERY - FIRST 15 MIN

## 2023-07-11 PROCEDURE — 36415 COLL VENOUS BLD VENIPUNCTURE: CPT

## 2023-07-11 PROCEDURE — 7100000011 HC PHASE II RECOVERY - ADDTL 15 MIN

## 2023-07-11 PROCEDURE — 2580000003 HC RX 258: Performed by: STUDENT IN AN ORGANIZED HEALTH CARE EDUCATION/TRAINING PROGRAM

## 2023-07-11 PROCEDURE — 93312 ECHO TRANSESOPHAGEAL: CPT

## 2023-07-11 RX ORDER — SODIUM CHLORIDE 9 MG/ML
INJECTION, SOLUTION INTRAVENOUS CONTINUOUS
Status: DISCONTINUED | OUTPATIENT
Start: 2023-07-11 | End: 2023-07-14 | Stop reason: HOSPADM

## 2023-07-11 RX ORDER — LIDOCAINE HYDROCHLORIDE 20 MG/ML
INJECTION, SOLUTION EPIDURAL; INFILTRATION; INTRACAUDAL; PERINEURAL PRN
Status: DISCONTINUED | OUTPATIENT
Start: 2023-07-11 | End: 2023-07-11 | Stop reason: SDUPTHER

## 2023-07-11 RX ORDER — PHENYLEPHRINE HCL IN 0.9% NACL 0.4MG/10ML
SYRINGE (ML) INTRAVENOUS PRN
Status: DISCONTINUED | OUTPATIENT
Start: 2023-07-11 | End: 2023-07-11 | Stop reason: SDUPTHER

## 2023-07-11 RX ORDER — 0.9 % SODIUM CHLORIDE 0.9 %
INTRAVENOUS SOLUTION INTRAVENOUS PRN
Status: DISCONTINUED | OUTPATIENT
Start: 2023-07-11 | End: 2023-07-11 | Stop reason: SDUPTHER

## 2023-07-11 RX ADMIN — LIDOCAINE HYDROCHLORIDE 100 MG: 20 INJECTION, SOLUTION EPIDURAL; INFILTRATION; INTRACAUDAL; PERINEURAL at 11:18

## 2023-07-11 RX ADMIN — Medication 100 MCG: at 11:18

## 2023-07-11 RX ADMIN — SODIUM CHLORIDE: 9 INJECTION, SOLUTION INTRAVENOUS at 09:48

## 2023-07-11 RX ADMIN — Medication 10 ML: at 11:05

## 2023-07-11 RX ADMIN — Medication 200 ML: at 11:27

## 2023-07-11 RX ADMIN — Medication 100 MCG: at 11:25

## 2023-07-11 RX ADMIN — Medication 100 MCG: at 11:21

## 2023-07-11 ASSESSMENT — PAIN - FUNCTIONAL ASSESSMENT: PAIN_FUNCTIONAL_ASSESSMENT: 0-10

## 2023-07-11 ASSESSMENT — ENCOUNTER SYMPTOMS: SHORTNESS OF BREATH: 1

## 2023-07-11 NOTE — ANESTHESIA POSTPROCEDURE EVALUATION
Department of Anesthesiology  Postprocedure Note    Patient: Tigist Owens  MRN: [id number]  YOB: 1945  Date of evaluation: 7/11/2023      Procedure Summary     Date: 07/11/23 Room / Location: [address] [place] [address] NON-INVASIVE CARDIOLOGY; [facility] EKG    Anesthesia Start: 1109 Anesthesia Stop: 1130    Procedure: TRANSESOPHAGEAL ECHOCARDIOGRAM (CONTRAST PRN) W/ POSSIBLE CARDIOVERSION Diagnosis:       Aortic stenosis      Aortic stenosis, moderate    Scheduled Providers: Estephania Kat MD; ALBERTA Bro - JUSTIN;  Britton Mccartney MD Responsible Provider: Britton Mccartney MD    Anesthesia Type: MAC ASA Status: Not recorded          Anesthesia Type: MAC    Hector Phase I: Hector Score: 10    Hector Phase II: Hector Score: 10      Anesthesia Post Evaluation    Patient location during evaluation: PACU  Patient participation: complete - patient cannot participate  Level of consciousness: awake  Pain score: 0  Airway patency: patent  Nausea & Vomiting: no nausea and no vomiting  Complications: no  Cardiovascular status: hemodynamically stable  Respiratory status: acceptable  Hydration status: stable  Multimodal analgesia pain management approach

## 2023-07-11 NOTE — ANESTHESIA PRE PROCEDURE
LLY0ARB, BEART, P2NOSXBG     Type & Screen (If Applicable):  No results found for: LABABO, LABRH    Drug/Infectious Status (If Applicable):  Lab Results   Component Value Date/Time    HEPCAB <0.1 01/28/2021 10:30 AM       COVID-19 Screening (If Applicable): No results found for: COVID19        Anesthesia Evaluation  Patient summary reviewed and Nursing notes reviewed no history of anesthetic complications:   Airway: Mallampati: II  TM distance: >3 FB   Neck ROM: full  Mouth opening: > = 3 FB   Dental: normal exam         Pulmonary:normal exam  breath sounds clear to auscultation  (+) shortness of breath:                             Cardiovascular:    (+) hypertension:, CAD:, dysrhythmias: atrial fibrillation,         Rhythm: regular  Rate: normal                    Neuro/Psych:   Negative Neuro/Psych ROS              GI/Hepatic/Renal: Neg GI/Hepatic/Renal ROS            Endo/Other: Negative Endo/Other ROS                    Abdominal:              PE comment: Deferred. Vascular: negative vascular ROS. Other Findings:           Anesthesia Plan      MAC and TIVA     ASA 3     (Standard ASA monitors: continuous EKG, BP, HR, pulse oximeter, and capnography.)  Induction: intravenous. MIPS: Prophylactic antiemetics administered. Anesthetic plan and risks discussed with patient (and family, if present. ). Plan discussed with CRNA.     Attending anesthesiologist reviewed and agrees with Preprocedure content                Angelita Ronquillo MD   7/11/2023

## 2023-07-11 NOTE — PROGRESS NOTES
Discharge instructions reviewed with julissa cardenas verbalized understanding, IV out and wheeled out to car.

## 2023-08-15 ENCOUNTER — TRANSCRIBE ORDERS (OUTPATIENT)
Facility: HOSPITAL | Age: 78
End: 2023-08-15

## 2023-08-15 DIAGNOSIS — I06.0 AORTIC VALVE STENOSIS, RHEUMATIC: Primary | ICD-10-CM

## 2023-09-14 RX ORDER — SIMVASTATIN 20 MG
TABLET ORAL
Qty: 90 TABLET | Refills: 0 | Status: SHIPPED | OUTPATIENT
Start: 2023-09-14

## 2023-10-11 ENCOUNTER — TELEPHONE (OUTPATIENT)
Age: 78
End: 2023-10-11

## 2023-10-11 RX ORDER — LOSARTAN POTASSIUM 100 MG/1
100 TABLET ORAL DAILY
Qty: 30 TABLET | Refills: 5 | Status: SHIPPED | OUTPATIENT
Start: 2023-10-11

## 2023-10-23 ENCOUNTER — HOSPITAL ENCOUNTER (OUTPATIENT)
Age: 78
Discharge: HOME | End: 2023-10-26
Payer: MEDICARE

## 2023-10-23 DIAGNOSIS — I10 ESSENTIAL (PRIMARY) HYPERTENSION: ICD-10-CM

## 2023-10-23 DIAGNOSIS — N17.1 ACUTE RENAL FAILURE WITH ACUTE CORTICAL NECROSIS (HCC): ICD-10-CM

## 2023-10-23 DIAGNOSIS — N18.31 CHRONIC KIDNEY DISEASE, STAGE 3A (HCC): ICD-10-CM

## 2023-10-23 LAB
ALBUMIN SERPL-MCNC: 3.9 G/DL (ref 3.4–5)
ANION GAP SERPL CALC-SCNC: 5 MMOL/L (ref 3–18)
APPEARANCE UR: CLEAR
BACTERIA URNS QL MICRO: NEGATIVE /HPF
BASOPHILS # BLD: 0.1 K/UL (ref 0–0.1)
BASOPHILS NFR BLD: 1 % (ref 0–2)
BILIRUB UR QL: NEGATIVE
BUN SERPL-MCNC: 13 MG/DL (ref 7–18)
BUN/CREAT SERPL: 12 (ref 12–20)
CA-I BLD-MCNC: 9 MG/DL (ref 8.5–10.1)
CHLORIDE SERPL-SCNC: 102 MMOL/L (ref 100–111)
CO2 SERPL-SCNC: 27 MMOL/L (ref 21–32)
COLOR UR: YELLOW
CREAT SERPL-MCNC: 1.08 MG/DL (ref 0.6–1.3)
DIFFERENTIAL METHOD BLD: ABNORMAL
EOSINOPHIL # BLD: 0.1 K/UL (ref 0–0.4)
EOSINOPHIL NFR BLD: 1 % (ref 0–5)
EPITH CASTS URNS QL MICRO: ABNORMAL /LPF (ref 0–20)
ERYTHROCYTE [DISTWIDTH] IN BLOOD BY AUTOMATED COUNT: 12.4 % (ref 11.6–14.5)
GLUCOSE SERPL-MCNC: 99 MG/DL (ref 74–99)
GLUCOSE UR STRIP.AUTO-MCNC: NEGATIVE MG/DL
HCT VFR BLD AUTO: 36.6 % (ref 35–45)
HGB BLD-MCNC: 12.6 G/DL (ref 12–16)
HGB UR QL STRIP: NEGATIVE
IMM GRANULOCYTES # BLD AUTO: 0 K/UL (ref 0–0.04)
IMM GRANULOCYTES NFR BLD AUTO: 1 % (ref 0–0.5)
KETONES UR QL STRIP.AUTO: NEGATIVE MG/DL
LEUKOCYTE ESTERASE UR QL STRIP.AUTO: ABNORMAL
LYMPHOCYTES # BLD: 1.8 K/UL (ref 0.9–3.6)
LYMPHOCYTES NFR BLD: 27 % (ref 21–52)
MCH RBC QN AUTO: 30.5 PG (ref 24–34)
MCHC RBC AUTO-ENTMCNC: 34.4 G/DL (ref 31–37)
MCV RBC AUTO: 88.6 FL (ref 78–100)
MONOCYTES # BLD: 0.6 K/UL (ref 0.05–1.2)
MONOCYTES NFR BLD: 9 % (ref 3–10)
NEUTS SEG # BLD: 4.1 K/UL (ref 1.8–8)
NEUTS SEG NFR BLD: 61 % (ref 40–73)
NITRITE UR QL STRIP.AUTO: NEGATIVE
NRBC # BLD: 0 K/UL (ref 0–0.01)
NRBC BLD-RTO: 0 PER 100 WBC
PH UR STRIP: 6 (ref 5–8)
PHOSPHATE SERPL-MCNC: 3.1 MG/DL (ref 2.5–4.9)
PLATELET # BLD AUTO: 343 K/UL (ref 135–420)
PMV BLD AUTO: 9.3 FL (ref 9.2–11.8)
POTASSIUM SERPL-SCNC: 3.9 MMOL/L (ref 3.5–5.5)
PROT UR STRIP-MCNC: NEGATIVE MG/DL
RBC # BLD AUTO: 4.13 M/UL (ref 4.2–5.3)
RBC #/AREA URNS HPF: ABNORMAL /HPF (ref 0–2)
SODIUM SERPL-SCNC: 134 MMOL/L (ref 136–145)
SP GR UR REFRACTOMETRY: 1.01 (ref 1–1.03)
UROBILINOGEN UR QL STRIP.AUTO: 1 EU/DL (ref 0.2–1)
WBC # BLD AUTO: 6.7 K/UL (ref 4.6–13.2)
WBC URNS QL MICRO: ABNORMAL /HPF (ref 0–4)

## 2023-10-23 PROCEDURE — 80069 RENAL FUNCTION PANEL: CPT

## 2023-10-23 PROCEDURE — 85025 COMPLETE CBC W/AUTO DIFF WBC: CPT

## 2023-10-23 PROCEDURE — 36415 COLL VENOUS BLD VENIPUNCTURE: CPT

## 2023-10-23 PROCEDURE — 81001 URINALYSIS AUTO W/SCOPE: CPT

## 2023-10-23 PROCEDURE — 83970 ASSAY OF PARATHORMONE: CPT

## 2023-10-24 LAB
CA-I BLD-MCNC: 8.9 MG/DL (ref 8.5–10.1)
PTH-INTACT SERPL-MCNC: 80 PG/ML (ref 18.4–88)

## 2023-11-01 ENCOUNTER — OFFICE VISIT (OUTPATIENT)
Facility: CLINIC | Age: 78
End: 2023-11-01
Payer: MEDICARE

## 2023-11-01 VITALS
WEIGHT: 172 LBS | TEMPERATURE: 98.3 F | BODY MASS INDEX: 28.62 KG/M2 | DIASTOLIC BLOOD PRESSURE: 69 MMHG | HEART RATE: 75 BPM | SYSTOLIC BLOOD PRESSURE: 132 MMHG | OXYGEN SATURATION: 98 %

## 2023-11-01 DIAGNOSIS — N18.31 CHRONIC KIDNEY DISEASE, STAGE 3A (HCC): ICD-10-CM

## 2023-11-01 DIAGNOSIS — I10 ESSENTIAL (PRIMARY) HYPERTENSION: Primary | ICD-10-CM

## 2023-11-01 DIAGNOSIS — N17.1 ACUTE RENAL FAILURE WITH ACUTE CORTICAL NECROSIS (HCC): ICD-10-CM

## 2023-11-01 PROCEDURE — 1124F ACP DISCUSS-NO DSCNMKR DOCD: CPT | Performed by: INTERNAL MEDICINE

## 2023-11-01 PROCEDURE — 99214 OFFICE O/P EST MOD 30 MIN: CPT | Performed by: INTERNAL MEDICINE

## 2023-11-01 PROCEDURE — 3075F SYST BP GE 130 - 139MM HG: CPT | Performed by: INTERNAL MEDICINE

## 2023-11-01 PROCEDURE — 1090F PRES/ABSN URINE INCON ASSESS: CPT | Performed by: INTERNAL MEDICINE

## 2023-11-01 PROCEDURE — G8417 CALC BMI ABV UP PARAM F/U: HCPCS | Performed by: INTERNAL MEDICINE

## 2023-11-01 PROCEDURE — G8427 DOCREV CUR MEDS BY ELIG CLIN: HCPCS | Performed by: INTERNAL MEDICINE

## 2023-11-01 PROCEDURE — G8484 FLU IMMUNIZE NO ADMIN: HCPCS | Performed by: INTERNAL MEDICINE

## 2023-11-01 PROCEDURE — 1036F TOBACCO NON-USER: CPT | Performed by: INTERNAL MEDICINE

## 2023-11-01 PROCEDURE — 3078F DIAST BP <80 MM HG: CPT | Performed by: INTERNAL MEDICINE

## 2023-11-01 PROCEDURE — G8400 PT W/DXA NO RESULTS DOC: HCPCS | Performed by: INTERNAL MEDICINE

## 2023-11-01 RX ORDER — LOSARTAN POTASSIUM 100 MG/1
100 TABLET ORAL DAILY
Qty: 90 TABLET | Refills: 2 | Status: SHIPPED | OUTPATIENT
Start: 2023-11-01

## 2023-11-01 NOTE — PROGRESS NOTES
Progress  Notes by Christy Duncan MD at 10/19/22 1000                    Author: Christy Duncan MD  Service: --  Author Type: Physician       Filed: 10/19/22 1426  Encounter Date: 10/19/2022  Status: Signed                                                       Renal Progress Note             Daily Progress Note: 10/19/2022             Subjective: The patient is seen for follow  up of CKD. HPI : Patient is here for a follow-up. Denies any complaints today. Denies any shortness of breath or swelling in the legs. Denies any chest pain. Denies any dysuria   Blood pressure is very well controlled in the office today   Creatinine stable at 1.0         Medications reviewed       Current Outpatient Medications       Medication                                No current facility-administered medications for this visit. Review of Systems:        Constitutional: Negative for chills and fever. HENT: Negative. Eyes: Negative. Respiratory: Negative. Cardiovascular: Negative. Gastrointestinal: Negative for abdominal pain and nausea. Skin: Negative. Neurological: Negative. Objective:     Physical Exam:       Visit Vitals        BP  129/74 (BP 1 Location: Right arm, BP Patient Position: Sitting, BP Cuff Size: Adult)     Pulse  70     Ht  5' 5\" (1.651 m)     Wt  77.8 kg (171 lb 9.6 oz)     SpO2  97%        BMI                          General:    Alert, cooperative, no distress, appears stated age.        Lungs:         Chest wall:        Heart:        Abdomen:      Extremities:       Neurologic:            Data Review:             PTH, Intact   Collected: 10/23/23 1502  Final result  Specimen: Blood Serum     Calcium 8.9 mg/dL Pth Intact 80.0 pg/mL          10/23/23 1713  Urinalysis   Collected: 10/23/23 1502  Final result  Specimen: Urine    Color, UA Yellow    Ketones, Urine Negative mg/dL   Appearance Clear   Bilirubin Urine Negative     Specific Gravity, UA

## 2023-11-01 NOTE — PROGRESS NOTES
Dom Leung presents today for   Chief Complaint   Patient presents with    Follow-up       Is someone accompanying this pt? no    Is the patient using any DME equipment during OV? no    Depression Screenin/24/2023    11:31 AM 10/19/2022    10:24 AM 2022     2:54 PM 2022    10:24 AM 2022    11:19 AM 10/7/2021    11:05 AM 2021    10:43 AM   PHQ-9 Questionaire   Little interest or pleasure in doing things 0 0 0 0 0 0 0   Feeling down, depressed, or hopeless 0 0 0 0 0 0 0   PHQ-9 Total Score 0 0 0 0 0 0 0       Fall Risk      2023    11:31 AM   Fall Risk   2 or more falls in past year? no   Fall with injury in past year? yes        Health Maintenance reviewed and discussed and ordered per Provider. Health Maintenance Due   Topic Date Due    COVID-19 Vaccine (1) Never done    DTaP/Tdap/Td vaccine (1 - Tdap) Never done    DEXA (modify frequency per FRAX score)  Never done    Shingles vaccine (1 of 2) 2013    Flu vaccine (1) Never done   . Coordination of Care:    1. \"Have you been to the ER, urgent care clinic since your last visit? Hospitalized since your last visit? \" No    2. \"Have you seen or consulted any other health care providers outside of the [address] [place] [address] since your last visit? \" No

## 2023-11-07 ENCOUNTER — HOSPITAL ENCOUNTER (OUTPATIENT)
Age: 78
Discharge: HOME | End: 2023-11-09
Payer: MEDICARE

## 2023-11-07 VITALS
WEIGHT: 172 LBS | BODY MASS INDEX: 28.66 KG/M2 | HEIGHT: 65 IN | DIASTOLIC BLOOD PRESSURE: 75 MMHG | SYSTOLIC BLOOD PRESSURE: 147 MMHG

## 2023-11-07 DIAGNOSIS — I06.0 AORTIC VALVE STENOSIS, RHEUMATIC: ICD-10-CM

## 2023-11-07 LAB
ECHO AO ROOT DIAM: 3.2 CM
ECHO AO ROOT INDEX: 1.72 CM/M2
ECHO AR MAX VEL PISA: 3.5 M/S
ECHO AV AREA PEAK VELOCITY: 1 CM2
ECHO AV AREA VTI: 0.8 CM2
ECHO AV AREA/BSA PEAK VELOCITY: 0.5 CM2/M2
ECHO AV AREA/BSA VTI: 0.4 CM2/M2
ECHO AV MEAN GRADIENT: 39 MMHG
ECHO AV MEAN VELOCITY: 2.9 M/S
ECHO AV PEAK GRADIENT: 65 MMHG
ECHO AV PEAK VELOCITY: 4 M/S
ECHO AV REGURGITANT PHT: 541.7 MILLISECOND
ECHO AV VELOCITY RATIO: 0.38
ECHO AV VTI: 94.1 CM
ECHO BSA: 1.89 M2
ECHO EST RA PRESSURE: 3 MMHG
ECHO LA DIAMETER INDEX: 2.26 CM/M2
ECHO LA DIAMETER: 4.2 CM
ECHO LA TO AORTIC ROOT RATIO: 1.31
ECHO LA VOL A-L A2C: 59 ML (ref 22–52)
ECHO LA VOL A-L A4C: 67 ML (ref 22–52)
ECHO LA VOL BP: 64 ML (ref 22–52)
ECHO LA VOL/BSA BIPLANE: 34 ML/M2 (ref 16–34)
ECHO LA VOLUME AREA LENGTH: 68 ML
ECHO LA VOLUME INDEX A-L A2C: 32 ML/M2 (ref 16–34)
ECHO LA VOLUME INDEX A-L A4C: 36 ML/M2 (ref 16–34)
ECHO LA VOLUME INDEX AREA LENGTH: 37 ML/M2 (ref 16–34)
ECHO LV E' LATERAL VELOCITY: 9 CM/S
ECHO LV E' SEPTAL VELOCITY: 8 CM/S
ECHO LV EJECTION FRACTION A2C: 69 %
ECHO LV EJECTION FRACTION A4C: 64 %
ECHO LV EJECTION FRACTION BIPLANE: 68 % (ref 55–100)
ECHO LV FRACTIONAL SHORTENING: 31 % (ref 28–44)
ECHO LV GLOBAL LONGITUDINAL STRAIN (GLS): -18.4 %
ECHO LV GLOBAL LONGITUDINAL STRAIN (GLS): -19.6 %
ECHO LV GLOBAL LONGITUDINAL STRAIN (GLS): -20.4 %
ECHO LV GLOBAL LONGITUDINAL STRAIN (GLS): -23.3 %
ECHO LV INTERNAL DIMENSION DIASTOLE INDEX: 2.1 CM/M2
ECHO LV INTERNAL DIMENSION DIASTOLIC: 3.9 CM (ref 3.9–5.3)
ECHO LV INTERNAL DIMENSION SYSTOLIC INDEX: 1.45 CM/M2
ECHO LV INTERNAL DIMENSION SYSTOLIC: 2.7 CM
ECHO LV IVSD: 1.1 CM (ref 0.6–0.9)
ECHO LV MASS 2D: 140.1 G (ref 67–162)
ECHO LV MASS INDEX 2D: 75.3 G/M2 (ref 43–95)
ECHO LV POSTERIOR WALL DIASTOLIC: 1.1 CM (ref 0.6–0.9)
ECHO LV RELATIVE WALL THICKNESS RATIO: 0.56
ECHO LVOT AREA: 2.8 CM2
ECHO LVOT AV VTI INDEX: 0.28
ECHO LVOT DIAM: 1.9 CM
ECHO LVOT MEAN GRADIENT: 5 MMHG
ECHO LVOT PEAK GRADIENT: 9 MMHG
ECHO LVOT PEAK VELOCITY: 1.5 M/S
ECHO LVOT STROKE VOLUME INDEX: 40.4 ML/M2
ECHO LVOT SV: 75.1 ML
ECHO LVOT VTI: 26.5 CM
ECHO MV A VELOCITY: 1.55 M/S
ECHO MV AREA PHT: 4.7 CM2
ECHO MV AREA VTI: 2.3 CM2
ECHO MV E DECELERATION TIME (DT): 249.4 MS
ECHO MV E VELOCITY: 1.1 M/S
ECHO MV E/A RATIO: 0.71
ECHO MV E/E' LATERAL: 12.22
ECHO MV E/E' RATIO (AVERAGED): 12.99
ECHO MV E/E' SEPTAL: 13.75
ECHO MV LVOT VTI INDEX: 1.26
ECHO MV MAX VELOCITY: 1.4 M/S
ECHO MV MEAN GRADIENT: 4 MMHG
ECHO MV MEAN VELOCITY: 1 M/S
ECHO MV PEAK GRADIENT: 8 MMHG
ECHO MV PRESSURE HALF TIME (PHT): 47 MS
ECHO MV VTI: 33.3 CM
ECHO PV MAX VELOCITY: 1.5 M/S
ECHO PV MEAN GRADIENT: 4 MMHG
ECHO PV MEAN VELOCITY: 0.9 M/S
ECHO PV PEAK GRADIENT: 9 MMHG
ECHO RA END SYSTOLIC VOLUME APICAL 4 CHAMBER INDEX BSA: 13 ML/M2
ECHO RA VOLUME BIPLANE METHOD OF DISKS: 26 ML
ECHO RA VOLUME INDEX BP: 14 ML/M2
ECHO RA VOLUME: 25 ML
ECHO RIGHT VENTRICULAR SYSTOLIC PRESSURE (RVSP): 39 MMHG
ECHO RV INTERNAL DIMENSION: 2.8 CM
ECHO RV TAPSE: 2.5 CM (ref 1.7–?)
ECHO TV REGURGITANT MAX VELOCITY: 3.01 M/S
ECHO TV REGURGITANT PEAK GRADIENT: 36 MMHG

## 2023-11-07 PROCEDURE — 76376 3D RENDER W/INTRP POSTPROCES: CPT

## 2024-04-11 ENCOUNTER — HOSPITAL ENCOUNTER (EMERGENCY)
Age: 79
Discharge: HOME | End: 2024-04-12
Attending: EMERGENCY MEDICINE
Payer: MEDICARE

## 2024-04-11 VITALS
SYSTOLIC BLOOD PRESSURE: 175 MMHG | WEIGHT: 180 LBS | OXYGEN SATURATION: 98 % | RESPIRATION RATE: 16 BRPM | BODY MASS INDEX: 29.99 KG/M2 | HEIGHT: 65 IN | DIASTOLIC BLOOD PRESSURE: 74 MMHG | HEART RATE: 89 BPM | TEMPERATURE: 98 F

## 2024-04-11 DIAGNOSIS — S81.802A WOUND OF LEFT LOWER EXTREMITY, INITIAL ENCOUNTER: Primary | ICD-10-CM

## 2024-04-11 PROCEDURE — 90715 TDAP VACCINE 7 YRS/> IM: CPT | Performed by: EMERGENCY MEDICINE

## 2024-04-11 PROCEDURE — 2500000003 HC RX 250 WO HCPCS

## 2024-04-11 PROCEDURE — 99284 EMERGENCY DEPT VISIT MOD MDM: CPT

## 2024-04-11 PROCEDURE — 90471 IMMUNIZATION ADMIN: CPT | Performed by: EMERGENCY MEDICINE

## 2024-04-11 PROCEDURE — 6360000002 HC RX W HCPCS: Performed by: EMERGENCY MEDICINE

## 2024-04-11 RX ORDER — LIDOCAINE HYDROCHLORIDE 10 MG/ML
5 INJECTION, SOLUTION INFILTRATION; PERINEURAL
Status: COMPLETED | OUTPATIENT
Start: 2024-04-11 | End: 2024-04-11

## 2024-04-11 RX ORDER — LIDOCAINE HYDROCHLORIDE 10 MG/ML
INJECTION, SOLUTION INFILTRATION; PERINEURAL
Status: COMPLETED
Start: 2024-04-11 | End: 2024-04-11

## 2024-04-11 RX ORDER — CLOPIDOGREL BISULFATE 75 MG/1
75 TABLET ORAL DAILY
COMMUNITY

## 2024-04-11 RX ADMIN — TETANUS TOXOID, REDUCED DIPHTHERIA TOXOID AND ACELLULAR PERTUSSIS VACCINE, ADSORBED 0.5 ML: 5; 2.5; 8; 8; 2.5 SUSPENSION INTRAMUSCULAR at 22:41

## 2024-04-11 RX ADMIN — LIDOCAINE HYDROCHLORIDE 5 ML: 10 INJECTION, SOLUTION INFILTRATION; PERINEURAL at 22:41

## 2024-04-12 NOTE — DISCHARGE INSTRUCTIONS
Return for pain, bleeding, fever not resolving with motrin or tylenol, shortness of breath, vomiting, decreased fluid intake, weakness, numbness, dizziness, or any change or concerns.      Suture needs to be removed in 10 days.

## 2024-04-12 NOTE — ED PROVIDER NOTES
[facility] EMERGENCY DEPT  EMERGENCY DEPARTMENT ENCOUNTER      Pt Name: Karla Rossi  MRN: [id number]  Birthdate 1945  Date of evaluation: 4/11/2024  Provider: Marek Norris MD    CHIEF COMPLAINT       Chief Complaint   Patient presents with    Leg Injury       HPI:  Karla Rossi is a 78 y.o. female who presents to the emergency department pt c/o bleeding, left lower leg, small area, keeps spraying lbood x one day. On eliquis. No weakness. No cp or sob.  No injury.  Unsure last tetanus     HPI    Nursing Notes were reviewed.    REVIEW OF SYSTEMS    (2-9 systems for level 4, 10 or more for level 5)     Review of Systems    Except as noted above the remainder of the review of systems was reviewed and negative.       PAST MEDICAL HISTORY     Past Medical History:   Diagnosis Date    Acute kidney injury (HCC)     Ankle fracture 08/12/2022    right    Aortic valve calcification     Arrhythmia     Arthritis     bilateral thumbs    CAD (coronary artery disease) Need heart valve repleace    Cancer (HCC)     Leukemia     CML (chronic myeloid leukemia) (HCC)     Hypercholesterolemia     Hypertension     MVP (mitral valve prolapse)          SURGICAL HISTORY       Past Surgical History:   Procedure Laterality Date    CATARACT REMOVAL  2008    CATARACT REMOVAL  2013    COLONOSCOPY  2008    1 polyp     COLONOSCOPY  2013    normal repeat 2018    CT BONE MARROW BIOPSY  9/21/2016    CT BONE MARROW BIOPSY 9/21/2016    MALIGNANT SKIN LESION EXCISION      TUBAL LIGATION           CURRENT MEDICATIONS       Discharge Medication List as of 4/12/2024 12:03 AM        CONTINUE these medications which have NOT CHANGED    Details   clopidogrel (PLAVIX) 75 MG tablet Take 1 tablet by mouth dailyHistorical Med      apixaban (ELIQUIS) 5 MG TABS tablet Take 1 tablet by mouth 2 times dailyHistorical Med      simvastatin (ZOCOR) 20 MG tablet TAKE 1 TABLET BY MOUTH AT BEDTIME, Disp-90 tablet, R-1Normal      losartan (COZAAR) 100 MG tablet

## 2024-04-12 NOTE — ED TRIAGE NOTES
Patient reports she has a small spot on her left leg that will not stop bleeding. Patient reports she has had to change bandages on the area every 30 minutes since last night. Patient states she is on Eliquis and Plavix.

## 2024-04-22 ENCOUNTER — NURSE ONLY (OUTPATIENT)
Dept: FAMILY MEDICINE CLINIC | Facility: CLINIC | Age: 79
End: 2024-04-22

## 2024-04-22 DIAGNOSIS — E55.9 VITAMIN D DEFICIENCY: ICD-10-CM

## 2024-04-22 DIAGNOSIS — E53.8 B12 DEFICIENCY: ICD-10-CM

## 2024-04-22 DIAGNOSIS — I10 ESSENTIAL (PRIMARY) HYPERTENSION: ICD-10-CM

## 2024-04-22 DIAGNOSIS — E78.2 MIXED HYPERLIPIDEMIA: Primary | ICD-10-CM

## 2024-04-22 NOTE — PROGRESS NOTES
I removed 1 suture (s) per provider instructions without difficulty.  Wound edges were well approximated.  Steri-strips were not used.  There was not evidence of infection.  Patient did tolerate well.

## 2024-04-26 ENCOUNTER — HOSPITAL ENCOUNTER (OUTPATIENT)
Age: 79
End: 2024-04-26
Payer: MEDICARE

## 2024-04-26 DIAGNOSIS — E55.9 VITAMIN D DEFICIENCY: ICD-10-CM

## 2024-04-26 DIAGNOSIS — E78.2 MIXED HYPERLIPIDEMIA: ICD-10-CM

## 2024-04-26 DIAGNOSIS — I10 ESSENTIAL (PRIMARY) HYPERTENSION: ICD-10-CM

## 2024-04-26 DIAGNOSIS — E53.8 B12 DEFICIENCY: ICD-10-CM

## 2024-04-26 LAB
ALBUMIN SERPL-MCNC: 4 G/DL (ref 3.4–5)
ALBUMIN/GLOB SERPL: 1.1 (ref 0.8–1.7)
ALP SERPL-CCNC: 72 U/L (ref 45–117)
ALT SERPL-CCNC: 25 U/L (ref 13–56)
ANION GAP SERPL CALC-SCNC: 8 MMOL/L (ref 3–18)
AST SERPL W P-5'-P-CCNC: 24 U/L (ref 10–38)
BILIRUB SERPL-MCNC: 1.5 MG/DL (ref 0.2–1)
BUN SERPL-MCNC: 22 MG/DL (ref 7–18)
BUN/CREAT SERPL: 14 (ref 12–20)
CA-I BLD-MCNC: 9.6 MG/DL (ref 8.5–10.1)
CHLORIDE SERPL-SCNC: 101 MMOL/L (ref 100–111)
CO2 SERPL-SCNC: 23 MMOL/L (ref 21–32)
CREAT SERPL-MCNC: 1.53 MG/DL (ref 0.6–1.3)
ERYTHROCYTE [DISTWIDTH] IN BLOOD BY AUTOMATED COUNT: 12.1 % (ref 11.6–14.5)
GLOBULIN SER CALC-MCNC: 3.6 G/DL (ref 2–4)
GLUCOSE SERPL-MCNC: 127 MG/DL (ref 74–99)
HCT VFR BLD AUTO: 34.7 % (ref 35–45)
HGB BLD-MCNC: 12 G/DL (ref 12–16)
MCH RBC QN AUTO: 29.3 PG (ref 24–34)
MCHC RBC AUTO-ENTMCNC: 34.6 G/DL (ref 31–37)
MCV RBC AUTO: 84.6 FL (ref 78–100)
NRBC # BLD: 0 K/UL (ref 0–0.01)
NRBC BLD-RTO: 0 PER 100 WBC
PLATELET # BLD AUTO: 256 K/UL (ref 135–420)
PMV BLD AUTO: 8.8 FL (ref 9.2–11.8)
POTASSIUM SERPL-SCNC: 4.5 MMOL/L (ref 3.5–5.5)
PROT SERPL-MCNC: 7.6 G/DL (ref 6.4–8.2)
RBC # BLD AUTO: 4.1 M/UL (ref 4.2–5.3)
SODIUM SERPL-SCNC: 132 MMOL/L (ref 136–145)
TSH SERPL DL<=0.05 MIU/L-ACNC: 1.57 UIU/ML (ref 0.36–3.74)
WBC # BLD AUTO: 7.7 K/UL (ref 4.6–13.2)

## 2024-04-26 PROCEDURE — 80053 COMPREHEN METABOLIC PANEL: CPT

## 2024-04-26 PROCEDURE — 82306 VITAMIN D 25 HYDROXY: CPT

## 2024-04-26 PROCEDURE — 85027 COMPLETE CBC AUTOMATED: CPT

## 2024-04-26 PROCEDURE — 84443 ASSAY THYROID STIM HORMONE: CPT

## 2024-04-26 PROCEDURE — 36415 COLL VENOUS BLD VENIPUNCTURE: CPT

## 2024-04-26 PROCEDURE — 82607 VITAMIN B-12: CPT

## 2024-04-26 PROCEDURE — 80061 LIPID PANEL: CPT

## 2024-04-27 LAB
25(OH)D3 SERPL-MCNC: 68.6 NG/ML (ref 30–100)
CHOLEST SERPL-MCNC: 177 MG/DL
HDLC SERPL-MCNC: 75 MG/DL (ref 40–60)
HDLC SERPL: 2.4 (ref 0–5)
LDLC SERPL CALC-MCNC: 84.2 MG/DL (ref 0–100)
LIPID PANEL: ABNORMAL
TRIGL SERPL-MCNC: 89 MG/DL
VIT B12 SERPL-MCNC: 1636 PG/ML (ref 211–911)
VLDLC SERPL CALC-MCNC: 17.8 MG/DL

## 2024-05-03 DIAGNOSIS — I10 ESSENTIAL (PRIMARY) HYPERTENSION: Primary | ICD-10-CM

## 2024-05-08 SDOH — ECONOMIC STABILITY: FOOD INSECURITY: WITHIN THE PAST 12 MONTHS, THE FOOD YOU BOUGHT JUST DIDN'T LAST AND YOU DIDN'T HAVE MONEY TO GET MORE.: NEVER TRUE

## 2024-05-08 SDOH — ECONOMIC STABILITY: INCOME INSECURITY: HOW HARD IS IT FOR YOU TO PAY FOR THE VERY BASICS LIKE FOOD, HOUSING, MEDICAL CARE, AND HEATING?: NOT HARD AT ALL

## 2024-05-08 SDOH — HEALTH STABILITY: PHYSICAL HEALTH: ON AVERAGE, HOW MANY MINUTES DO YOU ENGAGE IN EXERCISE AT THIS LEVEL?: 50 MIN

## 2024-05-08 SDOH — HEALTH STABILITY: PHYSICAL HEALTH: ON AVERAGE, HOW MANY DAYS PER WEEK DO YOU ENGAGE IN MODERATE TO STRENUOUS EXERCISE (LIKE A BRISK WALK)?: 3 DAYS

## 2024-05-08 SDOH — ECONOMIC STABILITY: FOOD INSECURITY: WITHIN THE PAST 12 MONTHS, YOU WORRIED THAT YOUR FOOD WOULD RUN OUT BEFORE YOU GOT MONEY TO BUY MORE.: NEVER TRUE

## 2024-05-08 SDOH — ECONOMIC STABILITY: TRANSPORTATION INSECURITY
IN THE PAST 12 MONTHS, HAS LACK OF TRANSPORTATION KEPT YOU FROM MEETINGS, WORK, OR FROM GETTING THINGS NEEDED FOR DAILY LIVING?: NO

## 2024-05-08 ASSESSMENT — PATIENT HEALTH QUESTIONNAIRE - PHQ9
SUM OF ALL RESPONSES TO PHQ9 QUESTIONS 1 & 2: 0
2. FEELING DOWN, DEPRESSED OR HOPELESS: NOT AT ALL
SUM OF ALL RESPONSES TO PHQ QUESTIONS 1-9: 0
1. LITTLE INTEREST OR PLEASURE IN DOING THINGS: NOT AT ALL
SUM OF ALL RESPONSES TO PHQ QUESTIONS 1-9: 0

## 2024-05-08 ASSESSMENT — LIFESTYLE VARIABLES
HOW OFTEN DO YOU HAVE A DRINK CONTAINING ALCOHOL: MONTHLY OR LESS
HOW MANY STANDARD DRINKS CONTAINING ALCOHOL DO YOU HAVE ON A TYPICAL DAY: 1 OR 2
HOW OFTEN DO YOU HAVE SIX OR MORE DRINKS ON ONE OCCASION: 1
HOW OFTEN DO YOU HAVE A DRINK CONTAINING ALCOHOL: 2
HOW MANY STANDARD DRINKS CONTAINING ALCOHOL DO YOU HAVE ON A TYPICAL DAY: 1

## 2024-05-09 ENCOUNTER — OFFICE VISIT (OUTPATIENT)
Dept: FAMILY MEDICINE CLINIC | Facility: CLINIC | Age: 79
End: 2024-05-09

## 2024-05-09 VITALS
DIASTOLIC BLOOD PRESSURE: 73 MMHG | RESPIRATION RATE: 18 BRPM | HEIGHT: 65 IN | TEMPERATURE: 97.3 F | HEART RATE: 75 BPM | SYSTOLIC BLOOD PRESSURE: 123 MMHG | WEIGHT: 168.4 LBS | BODY MASS INDEX: 28.06 KG/M2 | OXYGEN SATURATION: 96 %

## 2024-05-09 DIAGNOSIS — Z95.5 HISTORY OF HEART ARTERY STENT: ICD-10-CM

## 2024-05-09 DIAGNOSIS — D47.1 MYELOPROLIFERATIVE DISORDER (HCC): ICD-10-CM

## 2024-05-09 DIAGNOSIS — L60.9 NAIL ABNORMALITIES: ICD-10-CM

## 2024-05-09 DIAGNOSIS — N18.31 CHRONIC KIDNEY DISEASE, STAGE 3A (HCC): ICD-10-CM

## 2024-05-09 DIAGNOSIS — I25.10 CORONARY ARTERY DISEASE INVOLVING NATIVE CORONARY ARTERY OF NATIVE HEART WITHOUT ANGINA PECTORIS: ICD-10-CM

## 2024-05-09 DIAGNOSIS — Z79.01 CURRENT USE OF LONG TERM ANTICOAGULATION: ICD-10-CM

## 2024-05-09 DIAGNOSIS — M65.352 TRIGGER LITTLE FINGER OF LEFT HAND: ICD-10-CM

## 2024-05-09 DIAGNOSIS — Z78.0 POST-MENOPAUSAL: ICD-10-CM

## 2024-05-09 DIAGNOSIS — Z00.00 MEDICARE ANNUAL WELLNESS VISIT, SUBSEQUENT: Primary | ICD-10-CM

## 2024-05-09 DIAGNOSIS — C92.10 CHRONIC MYELOID LEUKEMIA, BCR/ABL-POSITIVE, NOT HAVING ACHIEVED REMISSION (HCC): ICD-10-CM

## 2024-05-09 RX ORDER — BIOTIN 10 MG
1 TABLET ORAL DAILY
COMMUNITY

## 2024-05-09 RX ORDER — SAME BUTANEDISULFONATE/BETAINE 400-600 MG
1 POWDER IN PACKET (EA) ORAL DAILY
COMMUNITY

## 2024-05-09 RX ORDER — MULTIVITAMIN WITH IRON
250 TABLET ORAL DAILY
COMMUNITY

## 2024-05-09 ASSESSMENT — ENCOUNTER SYMPTOMS
CHEST TIGHTNESS: 0
SHORTNESS OF BREATH: 0

## 2024-05-09 NOTE — PATIENT INSTRUCTIONS
with age. Having high blood pressure or having a family history of MCI may also increase your risk.  How is it diagnosed?  Your doctor will do a physical exam.  You may be asked questions to check your memory and other mental skills. Your doctor may also talk to close friends and family members. This can help the doctor figure out how your memory and other mental skills have changed.  You may get blood tests and tests that look at your brain.  These questions and tests can make sure you don't have other conditions that can cause symptoms like MCI. These include depression, sleep problems, and side effects from medicines.  How is it treated?  There are no medicines to treat MCI or to keep it from progressing to dementia. But treating conditions like high blood pressure and diabetes may help. A person with MCI needs routine follow-up visits with their doctor to check on changes in the person's mental skills.  How can you care for yourself at home?  Keeping your body active can help slow MCI. Exercises like walking can help. Try to stay active mentally too. Read or do things like crossword puzzles if you enjoy doing them.  If you need help coping with MCI, you may want to get support from family, friends, a support group, or a counselor who works with people who have MCI.  Though the future isn't always clear, it can be good to plan ahead with instructions for your care. These are called advanced directives. Having a plan can help make sure that you get the care you want.  Current as of: December 20, 2023               Content Version: 14.0  © 2006-2024 [employer].   Care instructions adapted under license by [employer]. If you have questions about a medical condition or this instruction, always ask your healthcare professional. [employer] disclaims any warranty or liability for your use of this information.           A Healthy Heart: Care Instructions  Overview     Coronary artery

## 2024-05-09 NOTE — PROGRESS NOTES
Karla Rossi presents today for   Chief Complaint   Patient presents with    Medicare AWV       Is someone accompanying this pt? no    Is the patient using any DME equipment during OV? no    Depression Screenin/8/2024     9:13 PM 2023    11:31 AM 10/19/2022    10:24 AM 2022     2:54 PM 2022    10:24 AM 2022    11:19 AM 10/7/2021    11:05 AM   PHQ-9 Questionaire   Little interest or pleasure in doing things 0 0 0 0 0 0 0   Feeling down, depressed, or hopeless 0 0 0 0 0 0 0   PHQ-9 Total Score 0 0 0 0 0 0 0       Fall Risk      2024     9:13 PM 2023    11:31 AM   Fall Risk   2 or more falls in past year? no no   Fall with injury in past year? no yes        Health Maintenance reviewed and discussed and ordered per Provider.    Health Maintenance Due   Topic Date Due    COVID-19 Vaccine (1) Never done    DEXA (modify frequency per FRAX score)  Never done    Respiratory Syncytial Virus (RSV) Pregnant or age 60 yrs+ (1 - 1-dose 60+ series) Never done    Shingles vaccine (2 of 3) 2013   .        \"Have you been to the ER, urgent care clinic since your last visit?  Hospitalized since your last visit?\"    YES - When: approximately 1 months ago.  Where and Why: wound on leg, notes in chart.    “Have you seen or consulted any other health care providers outside of [facility] since your last visit?”    NO

## 2024-05-09 NOTE — PROGRESS NOTES
Karla Rossi is a 78 y.o. female presents with   Chief Complaint   Patient presents with    Medicare AW        Diagnosis   1. Medicare annual wellness visit, subsequent       2. Post-menopausal       3. Chronic myeloid leukemia, BCR/ABL-positive, not having achieved remission (HCC)    Followed by oncology every 6 months on to [employer]a 300 mg twice daily   4. Chronic kidney disease, stage 3a (HCC)    Kidney function decreased on recent labs I requested patient have repeat BMP before today's visit however she has been out of town.  She has followed by Dr. Smith and has an appointment set up for the summer.   5. Myeloproliferative disorder (HCC)    Followed by oncology every 6 months on to Cigna 300 mg twice daily   6. Trigger little finger of left hand    Patient complains of left fifth digit \"curling up with intermittent pain \"for the past 3 months.  She relates she has to keep the finger taped otherwise it will curl and becomes difficult to open   7. Nail abnormalities    Patient complains of abnormal discolorations to all fingernails.  She states she had Derm appointment recently however she failed to mention that.  She states she has noticed a change in her nails over the past 6 months.   8. Coronary artery disease involving native coronary artery of native heart without angina pectoris    Patient has coronary artery disease is currently on clopidogrel 75 mg once daily she was previously on ASA 81 however that was DC'd because she was placed then on apixaban 5 mg twice daily as she was recently told the cardiac stent that was placed March 13, 2024 \"was too small \".  She states she had echocardiogram yesterday and has upcoming appointment with cardiology.  She relates that there is a large expense with the apixaban and has been to medication assistance in [place] with no financial help for the medication.   9. History of heart artery stent    3/11/24   10. Current use of long term anticoagulation    Currently

## 2024-05-13 ENCOUNTER — HOSPITAL ENCOUNTER (OUTPATIENT)
Age: 79
Discharge: HOME | End: 2024-05-16
Payer: MEDICARE

## 2024-05-13 ENCOUNTER — TELEPHONE (OUTPATIENT)
Dept: FAMILY MEDICINE CLINIC | Facility: CLINIC | Age: 79
End: 2024-05-13

## 2024-05-13 DIAGNOSIS — E87.1 HYPONATREMIA: Primary | ICD-10-CM

## 2024-05-13 DIAGNOSIS — I10 ESSENTIAL (PRIMARY) HYPERTENSION: ICD-10-CM

## 2024-05-13 LAB
ANION GAP SERPL CALC-SCNC: 8 MMOL/L (ref 3–18)
BUN SERPL-MCNC: 17 MG/DL (ref 7–18)
BUN/CREAT SERPL: 14 (ref 12–20)
CA-I BLD-MCNC: 9.5 MG/DL (ref 8.5–10.1)
CHLORIDE SERPL-SCNC: 95 MMOL/L (ref 100–111)
CO2 SERPL-SCNC: 24 MMOL/L (ref 21–32)
CREAT SERPL-MCNC: 1.21 MG/DL (ref 0.6–1.3)
GLUCOSE SERPL-MCNC: 80 MG/DL (ref 74–99)
POTASSIUM SERPL-SCNC: 4.6 MMOL/L (ref 3.5–5.5)
SODIUM SERPL-SCNC: 127 MMOL/L (ref 136–145)

## 2024-05-13 PROCEDURE — 80048 BASIC METABOLIC PNL TOTAL CA: CPT

## 2024-05-13 PROCEDURE — 36415 COLL VENOUS BLD VENIPUNCTURE: CPT

## 2024-05-13 NOTE — TELEPHONE ENCOUNTER
Sodium level has decreased once again.  Please order:    -repeat stat BMP   - stat serum osmolality  -stat urine osmolality  -stat urine Na      Please see if she could go first thing in the Tuesday morning to have these done.  If  at anytime she develops any increased weakness, dizziness lethargy headaches nausea vomiting or other concerning signs or symptoms she is to go immediately to the ED.

## 2024-05-14 ENCOUNTER — HOSPITAL ENCOUNTER (OUTPATIENT)
Age: 79
Discharge: HOME | End: 2024-05-17
Payer: MEDICARE

## 2024-05-14 ENCOUNTER — TELEPHONE (OUTPATIENT)
Dept: FAMILY MEDICINE CLINIC | Facility: CLINIC | Age: 79
End: 2024-05-14

## 2024-05-14 DIAGNOSIS — E87.1 HYPONATREMIA: ICD-10-CM

## 2024-05-14 LAB
ANION GAP SERPL CALC-SCNC: 8 MMOL/L (ref 3–18)
BUN SERPL-MCNC: 16 MG/DL (ref 7–18)
BUN/CREAT SERPL: 12 (ref 12–20)
CA-I BLD-MCNC: 9 MG/DL (ref 8.5–10.1)
CHLORIDE SERPL-SCNC: 96 MMOL/L (ref 100–111)
CO2 SERPL-SCNC: 25 MMOL/L (ref 21–32)
CREAT SERPL-MCNC: 1.29 MG/DL (ref 0.6–1.3)
GLUCOSE SERPL-MCNC: 116 MG/DL (ref 74–99)
POTASSIUM SERPL-SCNC: 4.6 MMOL/L (ref 3.5–5.5)
SODIUM SERPL-SCNC: 129 MMOL/L (ref 136–145)

## 2024-05-14 PROCEDURE — 84300 ASSAY OF URINE SODIUM: CPT

## 2024-05-14 PROCEDURE — 80048 BASIC METABOLIC PNL TOTAL CA: CPT

## 2024-05-14 PROCEDURE — 83930 ASSAY OF BLOOD OSMOLALITY: CPT

## 2024-05-14 PROCEDURE — 83935 ASSAY OF URINE OSMOLALITY: CPT

## 2024-05-14 PROCEDURE — 36415 COLL VENOUS BLD VENIPUNCTURE: CPT

## 2024-05-14 NOTE — TELEPHONE ENCOUNTER
Please retry both of patient's phone numbers if you are unable to reach her on either you need to reach out to her emergency contact it is imperative that she gets these labs done

## 2024-05-14 NOTE — TELEPHONE ENCOUNTER
Attempted to call patient, no answer. Voicemail did not  and I was unable to leave a message.    All stat labs ordered.

## 2024-05-15 ENCOUNTER — OFFICE VISIT (OUTPATIENT)
Age: 79
End: 2024-05-15
Payer: MEDICARE

## 2024-05-15 VITALS — HEIGHT: 65 IN | BODY MASS INDEX: 27.99 KG/M2 | WEIGHT: 168 LBS

## 2024-05-15 DIAGNOSIS — M65.352 TRIGGER FINGER, LEFT LITTLE FINGER: Primary | ICD-10-CM

## 2024-05-15 LAB
OSMOLALITY SERPL: 269 MOSM/KG H2O
OSMOLALITY UR: 301 MOSM/KG H2O
SODIUM UR-SCNC: 55 MMOL/L (ref 20–110)

## 2024-05-15 PROCEDURE — 1124F ACP DISCUSS-NO DSCNMKR DOCD: CPT

## 2024-05-15 PROCEDURE — G8417 CALC BMI ABV UP PARAM F/U: HCPCS

## 2024-05-15 PROCEDURE — G8400 PT W/DXA NO RESULTS DOC: HCPCS

## 2024-05-15 PROCEDURE — 1090F PRES/ABSN URINE INCON ASSESS: CPT

## 2024-05-15 PROCEDURE — 99213 OFFICE O/P EST LOW 20 MIN: CPT

## 2024-05-15 PROCEDURE — G8427 DOCREV CUR MEDS BY ELIG CLIN: HCPCS

## 2024-05-15 PROCEDURE — 20550 NJX 1 TENDON SHEATH/LIGAMENT: CPT

## 2024-05-15 PROCEDURE — 1036F TOBACCO NON-USER: CPT

## 2024-05-15 RX ORDER — LIDOCAINE HYDROCHLORIDE 10 MG/ML
0.5 INJECTION, SOLUTION INFILTRATION; PERINEURAL ONCE
Status: COMPLETED | OUTPATIENT
Start: 2024-05-15 | End: 2024-05-15

## 2024-05-15 RX ADMIN — LIDOCAINE HYDROCHLORIDE 0.5 ML: 10 INJECTION, SOLUTION INFILTRATION; PERINEURAL at 11:23

## 2024-05-15 NOTE — PROGRESS NOTES
Karla Rossi is a 78 y.o. female retiree.  Worker's Compensation and legal considerations: none    Chief Complaint   Patient presents with    Finger Pain     Left little finger locking     Pain Score:   0 - No pain    Subjective:     5/15/2024 HPI: Patient presents today with a concern of left little finger pain and locking over the last 3 or so months. She has been wearing coban at the PIP and DIP joints to prevent it from locking.     Date of onset:  Jan or Feb 2024  Injury: No  Prior Treatment:  Taping  Contributory history: n/a    ROS: Review of Systems - General ROS: negative except HPI    Past Medical History:   Diagnosis Date    Acute kidney injury (HCC)     Ankle fracture 08/12/2022    right    Aortic valve calcification     Arrhythmia     Arthritis     bilateral thumbs    CAD (coronary artery disease) Need heart valve repleace    Cancer (HCC)     Leukemia     CML (chronic myeloid leukemia) (HCC)     Hypercholesterolemia     Hypertension     MVP (mitral valve prolapse)        Past Surgical History:   Procedure Laterality Date    CATARACT REMOVAL  2008    CATARACT REMOVAL  2013    COLONOSCOPY  2008    1 polyp     COLONOSCOPY  2013    normal repeat 2018    CT BONE MARROW BIOPSY  9/21/2016    CT BONE MARROW BIOPSY 9/21/2016    MALIGNANT SKIN LESION EXCISION      TUBAL LIGATION          Current Outpatient Medications   Medication Sig Dispense Refill    Vitamin D-Vitamin K (K2-D3 5000) 5000-90 UNIT-MCG CAPS Take 1 capsule by mouth daily      magnesium (MAGNESIUM-OXIDE) 250 MG TABS tablet Take 1 tablet by mouth daily      Multiple Vitamins-Minerals (MULTIVITAMIN ADULT) CHEW Take 1 tablet by mouth daily      clopidogrel (PLAVIX) 75 MG tablet Take 1 tablet by mouth daily      apixaban (ELIQUIS) 5 MG TABS tablet Take 1 tablet by mouth 2 times daily      simvastatin (ZOCOR) 20 MG tablet TAKE 1 TABLET BY MOUTH AT BEDTIME 90 tablet 1    losartan (COZAAR) 100 MG tablet Take 1 tablet by mouth daily 90 tablet 2

## 2024-05-16 ENCOUNTER — HOSPITAL ENCOUNTER (OUTPATIENT)
Age: 79
Discharge: HOME | End: 2024-05-16
Payer: MEDICARE

## 2024-05-16 DIAGNOSIS — E87.1 HYPONATREMIA: Primary | ICD-10-CM

## 2024-05-16 DIAGNOSIS — Z78.0 POST-MENOPAUSAL: ICD-10-CM

## 2024-05-16 PROCEDURE — 77080 DXA BONE DENSITY AXIAL: CPT

## 2024-05-20 ENCOUNTER — TELEPHONE (OUTPATIENT)
Dept: FAMILY MEDICINE CLINIC | Facility: CLINIC | Age: 79
End: 2024-05-20

## 2024-05-20 NOTE — TELEPHONE ENCOUNTER
Can you call and do a TCM?  She was d/c from [facility] this weekend.  I scheduled her for Friday.

## 2024-05-21 ENCOUNTER — TELEPHONE (OUTPATIENT)
Dept: FAMILY MEDICINE CLINIC | Facility: CLINIC | Age: 79
End: 2024-05-21

## 2024-05-21 NOTE — TELEPHONE ENCOUNTER
Care Transitions Initial Follow Up Call    Outreach made within 2 business days of discharge: No    Patient: Karla Rossi Patient : 1945   MRN: [id number]  Reason for Admission: There are no discharge diagnoses documented for the most recent discharge.  Discharge Date: 24       Spoke with: Patient    Discharge department/facility: [facility] Interactive Patient Contact:  Was patient able to fill all prescriptions: Yes  Was patient instructed to bring all medications to the follow-up visit: No: patient now aware.  Is patient taking all medications as directed in the discharge summary? Yes  Does patient understand their discharge instructions: Yes  Does patient have questions or concerns that need addressed prior to 7-14 day follow up office visit: no    Scheduled appointment with PCP within 7-14 days    Follow Up  Future Appointments   Date Time Provider Department [place]   2024 10:30 AM Winnie Terry APRN - CNP [facility]   2024 10:15 AM Kelli Smith MD [facility] NEPH BS [facility]   2025 11:00 AM Winnie Terry APRN - CNP [facility]       Maryjo Deluca LPN

## 2024-05-22 ENCOUNTER — HOSPITAL ENCOUNTER (OUTPATIENT)
Age: 79
Discharge: HOME | End: 2024-05-25
Payer: MEDICARE

## 2024-05-22 DIAGNOSIS — E87.1 HYPONATREMIA: ICD-10-CM

## 2024-05-22 LAB
ANION GAP SERPL CALC-SCNC: 6 MMOL/L (ref 3–18)
BUN SERPL-MCNC: 19 MG/DL (ref 7–18)
BUN/CREAT SERPL: 14 (ref 12–20)
CA-I BLD-MCNC: 9.1 MG/DL (ref 8.5–10.1)
CHLORIDE SERPL-SCNC: 103 MMOL/L (ref 100–111)
CO2 SERPL-SCNC: 25 MMOL/L (ref 21–32)
CREAT SERPL-MCNC: 1.37 MG/DL (ref 0.6–1.3)
GLUCOSE SERPL-MCNC: 100 MG/DL (ref 74–99)
POTASSIUM SERPL-SCNC: 4.5 MMOL/L (ref 3.5–5.5)
SODIUM SERPL-SCNC: 134 MMOL/L (ref 136–145)

## 2024-05-22 PROCEDURE — 80048 BASIC METABOLIC PNL TOTAL CA: CPT

## 2024-05-22 PROCEDURE — 36415 COLL VENOUS BLD VENIPUNCTURE: CPT

## 2024-05-24 ENCOUNTER — OFFICE VISIT (OUTPATIENT)
Dept: FAMILY MEDICINE CLINIC | Facility: CLINIC | Age: 79
End: 2024-05-24

## 2024-05-24 VITALS
HEIGHT: 65 IN | SYSTOLIC BLOOD PRESSURE: 138 MMHG | RESPIRATION RATE: 18 BRPM | BODY MASS INDEX: 28.02 KG/M2 | DIASTOLIC BLOOD PRESSURE: 75 MMHG | WEIGHT: 168.2 LBS | OXYGEN SATURATION: 96 % | HEART RATE: 76 BPM | TEMPERATURE: 97.2 F

## 2024-05-24 DIAGNOSIS — E87.1 HYPONATREMIA: ICD-10-CM

## 2024-05-24 DIAGNOSIS — Z09 HOSPITAL DISCHARGE FOLLOW-UP: ICD-10-CM

## 2024-05-24 DIAGNOSIS — I25.10 CORONARY ARTERY DISEASE INVOLVING NATIVE CORONARY ARTERY OF NATIVE HEART WITHOUT ANGINA PECTORIS: Primary | ICD-10-CM

## 2024-05-24 RX ORDER — LANOLIN ALCOHOL/MO/W.PET/CERES
1000 CREAM (GRAM) TOPICAL DAILY
COMMUNITY

## 2024-05-24 ASSESSMENT — PATIENT HEALTH QUESTIONNAIRE - PHQ9
2. FEELING DOWN, DEPRESSED OR HOPELESS: NOT AT ALL
1. LITTLE INTEREST OR PLEASURE IN DOING THINGS: NOT AT ALL
SUM OF ALL RESPONSES TO PHQ QUESTIONS 1-9: 0
SUM OF ALL RESPONSES TO PHQ9 QUESTIONS 1 & 2: 0
SUM OF ALL RESPONSES TO PHQ QUESTIONS 1-9: 0

## 2024-05-24 ASSESSMENT — ENCOUNTER SYMPTOMS: SHORTNESS OF BREATH: 0

## 2024-05-24 NOTE — PROGRESS NOTES
Karla Rossi presents today for   Chief Complaint   Patient presents with    Follow-Up from Hospital       Is someone accompanying this pt? no    Is the patient using any DME equipment during OV? no    Depression Screenin/24/2024    10:39 AM 2024     9:13 PM 2023    11:31 AM 10/19/2022    10:24 AM 2022     2:54 PM 2022    10:24 AM 2022    11:19 AM   PHQ-9 Questionaire   Little interest or pleasure in doing things 0 0 0 0 0 0 0   Feeling down, depressed, or hopeless 0 0 0 0 0 0 0   PHQ-9 Total Score 0 0 0 0 0 0 0       Fall Risk      2024    10:39 AM 2024     9:13 PM 2023    11:31 AM   Fall Risk   2 or more falls in past year? no no no   Fall with injury in past year? no no yes        Health Maintenance reviewed and discussed and ordered per Provider.    Health Maintenance Due   Topic Date Due    COVID-19 Vaccine (1) Never done    Respiratory Syncytial Virus (RSV) Pregnant or age 60 yrs+ (1 - 1-dose 60+ series) Never done    Shingles vaccine (1 of 2) 2013   .        \"Have you been to the ER, urgent care clinic since your last visit?  Hospitalized since your last visit?\"    YES - When: approximately 1  weeks ago.  Where and Why: [facility] - notes in chart.    “Have you seen or consulted any other health care providers outside of [facility] since your last visit?”    NO           
magnesium (MAGNESIUM-OXIDE) 250 mg, Oral, DAILY    Multiple Vitamins-Minerals (MULTIVITAMIN ADULT) CHEW 1 tablet, Oral, DAILY    Omega-3 Fatty Acids (SALMON OIL-1000 PO) 1 capsule, Oral, DAILY    simvastatin (ZOCOR) 20 MG tablet TAKE 1 TABLET BY MOUTH AT BEDTIME    spironolactone (ALDACTONE) 25 MG tablet Take 1 tablet by mouth once daily    Tasigna 300 mg, Oral, EVERY 12 HOURS    turmeric 500 MG CAPS 1 capsule, Oral, DAILY    vitamin B-12 (CYANOCOBALAMIN) 1,000 mcg, Oral, DAILY    vitamin D (CHOLECALCIFEROL) 5,000 Units, Oral, DAILY    Vitamin D-Vitamin K (K2-D3 5000) 5000-90 UNIT-MCG CAPS 1 capsule, Oral, DAILY         REVIEW OF SYSTEMS  Review of Systems   Constitutional:  Negative for activity change and appetite change.   Respiratory:  Negative for shortness of breath.    Cardiovascular:  Negative for chest pain and leg swelling.   Neurological:  Negative for dizziness, seizures and light-headedness.          Objective:     PHYSICAL EXAM  Physical Exam  Constitutional:       Appearance: Normal appearance.   Musculoskeletal:      Right lower leg: No edema.      Left lower leg: No edema.   Neurological:      Mental Status: She is alert and oriented to person, place, and time.   Psychiatric:         Behavior: Behavior normal.           Assessment/Plan:     1. Coronary artery disease involving native coronary artery of native heart without angina pectoris  -     apixaban (ELIQUIS) 5 MG TABS tablet; Take 1 tablet by mouth 2 times daily, Disp-180 tablet, R-1Print    I have printed a prescription for the patient to send to [facility] if she receives approval from cardiology.  Patient is very logical and trustworthy and states she will not fill the medication without permission from cardiology.  2. Hyponatremia  -     Basic Metabolic Panel; Future  We tried to work the patient in with nephrology sooner however there is no availability she will keep her appointment for the end of June continue mineral replacement

## 2024-05-31 ENCOUNTER — HOSPITAL ENCOUNTER (OUTPATIENT)
Age: 79
End: 2024-05-31
Payer: MEDICARE

## 2024-05-31 DIAGNOSIS — E87.1 HYPONATREMIA: ICD-10-CM

## 2024-05-31 LAB
ANION GAP SERPL CALC-SCNC: 7 MMOL/L (ref 3–18)
BUN SERPL-MCNC: 15 MG/DL (ref 7–18)
BUN/CREAT SERPL: 11 (ref 12–20)
CA-I BLD-MCNC: 9 MG/DL (ref 8.5–10.1)
CHLORIDE SERPL-SCNC: 103 MMOL/L (ref 100–111)
CO2 SERPL-SCNC: 24 MMOL/L (ref 21–32)
CREAT SERPL-MCNC: 1.4 MG/DL (ref 0.6–1.3)
GLUCOSE SERPL-MCNC: 99 MG/DL (ref 74–99)
POTASSIUM SERPL-SCNC: 3.9 MMOL/L (ref 3.5–5.5)
SODIUM SERPL-SCNC: 134 MMOL/L (ref 136–145)

## 2024-05-31 PROCEDURE — 36415 COLL VENOUS BLD VENIPUNCTURE: CPT

## 2024-05-31 PROCEDURE — 80048 BASIC METABOLIC PNL TOTAL CA: CPT

## 2024-06-17 ENCOUNTER — HOSPITAL ENCOUNTER (OUTPATIENT)
Age: 79
Discharge: HOME | End: 2024-06-20
Payer: MEDICARE

## 2024-06-17 DIAGNOSIS — N17.1 ACUTE RENAL FAILURE WITH ACUTE CORTICAL NECROSIS (HCC): ICD-10-CM

## 2024-06-17 DIAGNOSIS — N18.31 CHRONIC KIDNEY DISEASE, STAGE 3A (HCC): ICD-10-CM

## 2024-06-17 DIAGNOSIS — I10 ESSENTIAL (PRIMARY) HYPERTENSION: ICD-10-CM

## 2024-06-17 LAB
ALBUMIN SERPL-MCNC: 4 G/DL (ref 3.4–5)
ANION GAP SERPL CALC-SCNC: 6 MMOL/L (ref 3–18)
APPEARANCE UR: CLEAR
BACTERIA URNS QL MICRO: ABNORMAL /HPF
BASOPHILS # BLD: 0.1 K/UL (ref 0–0.1)
BASOPHILS NFR BLD: 1 % (ref 0–2)
BILIRUB UR QL: NEGATIVE
BUN SERPL-MCNC: 16 MG/DL (ref 7–18)
BUN/CREAT SERPL: 12 (ref 12–20)
CA-I BLD-MCNC: 9.2 MG/DL (ref 8.5–10.1)
CHLORIDE SERPL-SCNC: 102 MMOL/L (ref 100–111)
CO2 SERPL-SCNC: 23 MMOL/L (ref 21–32)
COLOR UR: ABNORMAL
CREAT SERPL-MCNC: 1.31 MG/DL (ref 0.6–1.3)
DIFFERENTIAL METHOD BLD: ABNORMAL
EOSINOPHIL # BLD: 0.1 K/UL (ref 0–0.4)
EOSINOPHIL NFR BLD: 1 % (ref 0–5)
EPITH CASTS URNS QL MICRO: ABNORMAL /LPF (ref 0–20)
ERYTHROCYTE [DISTWIDTH] IN BLOOD BY AUTOMATED COUNT: 12.9 % (ref 11.6–14.5)
GLUCOSE SERPL-MCNC: 114 MG/DL (ref 74–99)
GLUCOSE UR STRIP.AUTO-MCNC: NEGATIVE MG/DL
HCT VFR BLD AUTO: 35.8 % (ref 35–45)
HGB BLD-MCNC: 12.1 G/DL (ref 12–16)
HGB UR QL STRIP: NEGATIVE
IMM GRANULOCYTES # BLD AUTO: 0 K/UL (ref 0–0.04)
IMM GRANULOCYTES NFR BLD AUTO: 0 % (ref 0–0.5)
KETONES UR QL STRIP.AUTO: NEGATIVE MG/DL
LEUKOCYTE ESTERASE UR QL STRIP.AUTO: ABNORMAL
LYMPHOCYTES # BLD: 1.2 K/UL (ref 0.9–3.6)
LYMPHOCYTES NFR BLD: 15 % (ref 21–52)
MCH RBC QN AUTO: 29.4 PG (ref 24–34)
MCHC RBC AUTO-ENTMCNC: 33.8 G/DL (ref 31–37)
MCV RBC AUTO: 87.1 FL (ref 78–100)
MONOCYTES # BLD: 0.8 K/UL (ref 0.05–1.2)
MONOCYTES NFR BLD: 10 % (ref 3–10)
MUCOUS THREADS URNS QL MICRO: NEGATIVE /LPF
NEUTS SEG # BLD: 5.5 K/UL (ref 1.8–8)
NEUTS SEG NFR BLD: 73 % (ref 40–73)
NITRITE UR QL STRIP.AUTO: NEGATIVE
NRBC # BLD: 0 K/UL (ref 0–0.01)
NRBC BLD-RTO: 0 PER 100 WBC
PH UR STRIP: 5.5 (ref 5–8)
PHOSPHATE SERPL-MCNC: 3.3 MG/DL (ref 2.5–4.9)
PLATELET # BLD AUTO: 270 K/UL (ref 135–420)
PMV BLD AUTO: 9.5 FL (ref 9.2–11.8)
POTASSIUM SERPL-SCNC: 4.4 MMOL/L (ref 3.5–5.5)
PROT UR STRIP-MCNC: NEGATIVE MG/DL
RBC # BLD AUTO: 4.11 M/UL (ref 4.2–5.3)
RBC #/AREA URNS HPF: ABNORMAL /HPF (ref 0–2)
SODIUM SERPL-SCNC: 131 MMOL/L (ref 136–145)
SP GR UR REFRACTOMETRY: 1.01 (ref 1–1.03)
UROBILINOGEN UR QL STRIP.AUTO: 0.2 EU/DL (ref 0.2–1)
WBC # BLD AUTO: 7.6 K/UL (ref 4.6–13.2)
WBC URNS QL MICRO: ABNORMAL /HPF (ref 0–4)

## 2024-06-17 PROCEDURE — 80069 RENAL FUNCTION PANEL: CPT

## 2024-06-17 PROCEDURE — 85025 COMPLETE CBC W/AUTO DIFF WBC: CPT

## 2024-06-17 PROCEDURE — 84156 ASSAY OF PROTEIN URINE: CPT

## 2024-06-17 PROCEDURE — 36415 COLL VENOUS BLD VENIPUNCTURE: CPT

## 2024-06-17 PROCEDURE — 83970 ASSAY OF PARATHORMONE: CPT

## 2024-06-17 PROCEDURE — 82570 ASSAY OF URINE CREATININE: CPT

## 2024-06-17 PROCEDURE — 81001 URINALYSIS AUTO W/SCOPE: CPT

## 2024-06-18 LAB
CA-I BLD-MCNC: 9.4 MG/DL (ref 8.5–10.1)
CREAT UR-MCNC: 87 MG/DL (ref 30–125)
PROT UR-MCNC: 6 MG/DL
PROT/CREAT UR-RTO: 0.1
PTH-INTACT SERPL-MCNC: 60 PG/ML (ref 18.4–88)

## 2024-06-26 ENCOUNTER — OFFICE VISIT (OUTPATIENT)
Age: 79
End: 2024-06-26
Payer: MEDICARE

## 2024-06-26 VITALS
BODY MASS INDEX: 27.89 KG/M2 | SYSTOLIC BLOOD PRESSURE: 128 MMHG | DIASTOLIC BLOOD PRESSURE: 72 MMHG | WEIGHT: 167.6 LBS | OXYGEN SATURATION: 97 % | HEART RATE: 79 BPM

## 2024-06-26 DIAGNOSIS — E87.1 HYPONATREMIA: ICD-10-CM

## 2024-06-26 DIAGNOSIS — N18.31 CHRONIC KIDNEY DISEASE, STAGE 3A (HCC): Primary | ICD-10-CM

## 2024-06-26 PROCEDURE — G8417 CALC BMI ABV UP PARAM F/U: HCPCS | Performed by: INTERNAL MEDICINE

## 2024-06-26 PROCEDURE — G8399 PT W/DXA RESULTS DOCUMENT: HCPCS | Performed by: INTERNAL MEDICINE

## 2024-06-26 PROCEDURE — 1036F TOBACCO NON-USER: CPT | Performed by: INTERNAL MEDICINE

## 2024-06-26 PROCEDURE — 99214 OFFICE O/P EST MOD 30 MIN: CPT | Performed by: INTERNAL MEDICINE

## 2024-06-26 PROCEDURE — 1090F PRES/ABSN URINE INCON ASSESS: CPT | Performed by: INTERNAL MEDICINE

## 2024-06-26 PROCEDURE — 1124F ACP DISCUSS-NO DSCNMKR DOCD: CPT | Performed by: INTERNAL MEDICINE

## 2024-06-26 PROCEDURE — G8428 CUR MEDS NOT DOCUMENT: HCPCS | Performed by: INTERNAL MEDICINE

## 2024-06-26 RX ORDER — MULTIVIT WITH MINERALS/LUTEIN
1000 TABLET ORAL DAILY
COMMUNITY

## 2024-06-26 RX ORDER — ACETAMINOPHEN 325 MG/1
650 TABLET ORAL EVERY 6 HOURS PRN
COMMUNITY

## 2024-06-26 RX ORDER — VITAMIN E 268 MG
400 CAPSULE ORAL DAILY
COMMUNITY

## 2024-06-26 RX ORDER — SODIUM CHLORIDE 1 G/1
1 TABLET ORAL DAILY
Qty: 90 TABLET | Refills: 3 | Status: SHIPPED | OUTPATIENT
Start: 2024-06-26

## 2024-06-26 RX ORDER — ASPIRIN 325 MG
325 TABLET, DELAYED RELEASE (ENTERIC COATED) ORAL DAILY
COMMUNITY

## 2024-06-26 NOTE — PROGRESS NOTES
1. Have you been to the ER, urgent care clinic since your last visit?  Hospitalized since your last visit?Yes Heart valve replacement in Feb and a heart sent in March . Was seen in the ER for low sodium level.    2. Have you seen or consulted any other health care providers outside of the [facility] since your last visit?  Include any pap smears or colon screening. Yes [facility]

## 2024-06-26 NOTE — PROGRESS NOTES
Progress  Notes by Kelli Smith MD at 10/19/22 1000                    Author: Kelli Smith MD  Service: --  Author Type: Physician       Filed: 10/19/22 1426  Encounter Date: 10/19/2022  Status: Signed                                                       Renal Progress Note             Daily Progress Note: 10/19/2022             Subjective:     The patient is seen for follow  up of CKD.       HPI : Patient is here for a follow-up.  Denies any complaints today.  Denies any shortness of breath or swelling in the legs.  Denies any chest pain.  Denies any dysuria   Blood pressure is very well controlled in the office today  Since her last visit she was admitted for severe hyponatremia.  According to patient sodium was 121 though I have 127 in the computer.  She was started on salt tablets which have now been discontinued.  Her sodium this time is 131.  Creatinine stable at 1.3  She is on spironolactone and losartan for blood pressure control.  Off hydrochlorothiazide for the past 1 year      Medications reviewed       Current Outpatient Medications       Medication                                No current facility-administered medications for this visit.               Review of Systems:        Constitutional: Negative for chills and fever.    HENT: Negative.     Eyes: Negative.     Respiratory: Negative.     Cardiovascular: Negative.     Gastrointestinal: Negative for abdominal pain and nausea.    Skin: Negative.     Neurological: Negative.          Objective:     Physical Exam:       Visit Vitals        BP  129/74 (BP 1 Location: Right arm, BP Patient Position: Sitting, BP Cuff Size: Adult)     Pulse  70     Ht  5' 5\" (1.651 m)     Wt  77.8 kg (171 lb 9.6 oz)     SpO2  97%        BMI                          General:    Alert, cooperative, no distress, appears stated age.       Lungs:         Chest wall:        Heart:        Abdomen:      Extremities:       Neurologic:            Data Review:

## 2024-06-27 ENCOUNTER — TRANSCRIBE ORDERS (OUTPATIENT)
Facility: HOSPITAL | Age: 79
End: 2024-06-27

## 2024-06-27 DIAGNOSIS — I35.0 NONRHEUMATIC AORTIC VALVE STENOSIS: Primary | ICD-10-CM

## 2024-08-05 RX ORDER — SIMVASTATIN 20 MG
TABLET ORAL
Qty: 90 TABLET | Refills: 0 | Status: SHIPPED | OUTPATIENT
Start: 2024-08-05

## 2024-09-03 ENCOUNTER — HOSPITAL ENCOUNTER (OUTPATIENT)
Age: 79
Discharge: HOME | End: 2024-09-05
Payer: MEDICARE

## 2024-09-03 VITALS
HEIGHT: 65 IN | WEIGHT: 165 LBS | BODY MASS INDEX: 27.49 KG/M2 | DIASTOLIC BLOOD PRESSURE: 74 MMHG | SYSTOLIC BLOOD PRESSURE: 122 MMHG

## 2024-09-03 DIAGNOSIS — I35.0 NONRHEUMATIC AORTIC VALVE STENOSIS: ICD-10-CM

## 2024-09-03 LAB
ECHO AO ASC DIAM: 3.6 CM
ECHO AO ASCENDING AORTA INDEX: 1.98 CM/M2
ECHO AO ROOT DIAM: 3 CM
ECHO AO ROOT INDEX: 1.65 CM/M2
ECHO AV AREA PEAK VELOCITY: 1.7 CM2
ECHO AV AREA VTI: 1.8 CM2
ECHO AV AREA/BSA PEAK VELOCITY: 0.9 CM2/M2
ECHO AV AREA/BSA VTI: 1 CM2/M2
ECHO AV MEAN GRADIENT: 18 MMHG
ECHO AV MEAN VELOCITY: 1.9 M/S
ECHO AV PEAK GRADIENT: 33 MMHG
ECHO AV PEAK VELOCITY: 2.9 M/S
ECHO AV VELOCITY RATIO: 0.55
ECHO AV VTI: 63.8 CM
ECHO BSA: 1.85 M2
ECHO EST RA PRESSURE: 3 MMHG
ECHO IVC PROX: 1.9 CM
ECHO LA AREA 2C: 19.9 CM2
ECHO LA AREA 4C: 22.9 CM2
ECHO LA DIAMETER INDEX: 2.2 CM/M2
ECHO LA DIAMETER: 4 CM
ECHO LA MAJOR AXIS: 6.3 CM
ECHO LA MINOR AXIS: 5.2 CM
ECHO LA TO AORTIC ROOT RATIO: 1.33
ECHO LA VOL BP: 73 ML (ref 22–52)
ECHO LA VOL MOD A2C: 62 ML (ref 22–52)
ECHO LA VOL MOD A4C: 70 ML (ref 22–52)
ECHO LA VOL/BSA BIPLANE: 40 ML/M2 (ref 16–34)
ECHO LA VOLUME INDEX MOD A2C: 34 ML/M2 (ref 16–34)
ECHO LA VOLUME INDEX MOD A4C: 38 ML/M2 (ref 16–34)
ECHO LV E' LATERAL VELOCITY: 9 CM/S
ECHO LV E' SEPTAL VELOCITY: 6 CM/S
ECHO LV EDV A2C: 43 ML
ECHO LV EDV A4C: 46 ML
ECHO LV EDV INDEX A4C: 25 ML/M2
ECHO LV EDV NDEX A2C: 24 ML/M2
ECHO LV EF PHYSICIAN: 65 %
ECHO LV EJECTION FRACTION A2C: 65 %
ECHO LV EJECTION FRACTION A4C: 65 %
ECHO LV EJECTION FRACTION BIPLANE: 64 % (ref 55–100)
ECHO LV ESV A2C: 15 ML
ECHO LV ESV A4C: 16 ML
ECHO LV ESV INDEX A2C: 8 ML/M2
ECHO LV ESV INDEX A4C: 9 ML/M2
ECHO LV FRACTIONAL SHORTENING: 36 % (ref 28–44)
ECHO LV INTERNAL DIMENSION DIASTOLE INDEX: 2.31 CM/M2
ECHO LV INTERNAL DIMENSION DIASTOLIC: 4.2 CM (ref 3.9–5.3)
ECHO LV INTERNAL DIMENSION SYSTOLIC INDEX: 1.48 CM/M2
ECHO LV INTERNAL DIMENSION SYSTOLIC: 2.7 CM
ECHO LV IVSD: 1.2 CM (ref 0.6–0.9)
ECHO LV MASS 2D: 157.1 G (ref 67–162)
ECHO LV MASS INDEX 2D: 86.3 G/M2 (ref 43–95)
ECHO LV POSTERIOR WALL DIASTOLIC: 1 CM (ref 0.6–0.9)
ECHO LV RELATIVE WALL THICKNESS RATIO: 0.48
ECHO LVOT AREA: 3.1 CM2
ECHO LVOT AV VTI INDEX: 0.58
ECHO LVOT DIAM: 2 CM
ECHO LVOT MEAN GRADIENT: 6 MMHG
ECHO LVOT PEAK GRADIENT: 10 MMHG
ECHO LVOT PEAK VELOCITY: 1.6 M/S
ECHO LVOT STROKE VOLUME INDEX: 64.2 ML/M2
ECHO LVOT SV: 116.8 ML
ECHO LVOT VTI: 37.2 CM
ECHO MAIN PULMONARY ARTERY DIAMETER: 1.8 CM
ECHO MV A VELOCITY: 1.47 M/S
ECHO MV AREA VTI: 3.3 CM2
ECHO MV E DECELERATION TIME (DT): 271 MS
ECHO MV E VELOCITY: 1.19 M/S
ECHO MV E/A RATIO: 0.81
ECHO MV E/E' LATERAL: 13.22
ECHO MV E/E' RATIO (AVERAGED): 16.53
ECHO MV E/E' SEPTAL: 19.83
ECHO MV LVOT VTI INDEX: 0.95
ECHO MV MAX VELOCITY: 1.4 M/S
ECHO MV MEAN GRADIENT: 3 MMHG
ECHO MV MEAN VELOCITY: 0.8 M/S
ECHO MV PEAK GRADIENT: 8 MMHG
ECHO MV VTI: 35.3 CM
ECHO PV MAX VELOCITY: 1.1 M/S
ECHO PV PEAK GRADIENT: 5 MMHG
ECHO RA AREA 4C: 17.1 CM2
ECHO RA END SYSTOLIC VOLUME APICAL 4 CHAMBER INDEX BSA: 25 ML/M2
ECHO RA VOLUME: 46 ML
ECHO RIGHT VENTRICULAR SYSTOLIC PRESSURE (RVSP): 24 MMHG
ECHO RV BASAL DIMENSION: 3.3 CM
ECHO RV LONGITUDINAL DIMENSION: 6.5 CM
ECHO RV MID DIMENSION: 2.6 CM
ECHO RV TAPSE: 1.7 CM (ref 1.7–?)
ECHO TV REGURGITANT MAX VELOCITY: 2.27 M/S
ECHO TV REGURGITANT PEAK GRADIENT: 21 MMHG

## 2024-09-03 PROCEDURE — 93306 TTE W/DOPPLER COMPLETE: CPT

## 2024-10-10 ENCOUNTER — HOSPITAL ENCOUNTER (OUTPATIENT)
Age: 79
Setting detail: SPECIMEN
Discharge: HOME | End: 2024-10-13
Payer: MEDICARE

## 2024-10-10 DIAGNOSIS — N18.31 CHRONIC KIDNEY DISEASE, STAGE 3A (HCC): ICD-10-CM

## 2024-10-10 DIAGNOSIS — E87.1 HYPONATREMIA: ICD-10-CM

## 2024-10-10 LAB
ALBUMIN SERPL-MCNC: 4 G/DL (ref 3.4–5)
ANION GAP SERPL CALC-SCNC: 5 MMOL/L (ref 3–18)
BUN SERPL-MCNC: 26 MG/DL (ref 7–18)
BUN/CREAT SERPL: 18 (ref 12–20)
CA-I BLD-MCNC: 8.9 MG/DL (ref 8.5–10.1)
CHLORIDE SERPL-SCNC: 105 MMOL/L (ref 100–111)
CO2 SERPL-SCNC: 25 MMOL/L (ref 21–32)
CREAT SERPL-MCNC: 1.48 MG/DL (ref 0.6–1.3)
GLUCOSE SERPL-MCNC: 103 MG/DL (ref 74–99)
PHOSPHATE SERPL-MCNC: 2.6 MG/DL (ref 2.5–4.9)
POTASSIUM SERPL-SCNC: 4.5 MMOL/L (ref 3.5–5.5)
SODIUM SERPL-SCNC: 135 MMOL/L (ref 136–145)

## 2024-10-10 PROCEDURE — 36415 COLL VENOUS BLD VENIPUNCTURE: CPT

## 2024-10-10 PROCEDURE — 83935 ASSAY OF URINE OSMOLALITY: CPT

## 2024-10-10 PROCEDURE — 80069 RENAL FUNCTION PANEL: CPT

## 2024-10-10 PROCEDURE — 84300 ASSAY OF URINE SODIUM: CPT

## 2024-10-11 LAB
OSMOLALITY UR: 472 MOSM/KG H2O
SODIUM UR-SCNC: 40 MMOL/L (ref 20–110)

## 2024-10-30 RX ORDER — LOSARTAN POTASSIUM 100 MG/1
100 TABLET ORAL DAILY
Qty: 30 TABLET | Refills: 3 | Status: SHIPPED | OUTPATIENT
Start: 2024-10-30

## 2024-11-04 RX ORDER — SIMVASTATIN 20 MG
TABLET ORAL
Qty: 90 TABLET | Refills: 0 | Status: SHIPPED | OUTPATIENT
Start: 2024-11-04

## 2024-12-09 ENCOUNTER — OFFICE VISIT (OUTPATIENT)
Age: 79
End: 2024-12-09
Payer: MEDICARE

## 2024-12-09 VITALS
BODY MASS INDEX: 26.89 KG/M2 | OXYGEN SATURATION: 98 % | HEART RATE: 70 BPM | DIASTOLIC BLOOD PRESSURE: 71 MMHG | HEIGHT: 65 IN | SYSTOLIC BLOOD PRESSURE: 126 MMHG | WEIGHT: 161.4 LBS

## 2024-12-09 DIAGNOSIS — N18.31 CHRONIC KIDNEY DISEASE, STAGE 3A (HCC): Primary | ICD-10-CM

## 2024-12-09 PROCEDURE — G8417 CALC BMI ABV UP PARAM F/U: HCPCS | Performed by: NURSE PRACTITIONER

## 2024-12-09 PROCEDURE — 1090F PRES/ABSN URINE INCON ASSESS: CPT | Performed by: NURSE PRACTITIONER

## 2024-12-09 PROCEDURE — 1126F AMNT PAIN NOTED NONE PRSNT: CPT | Performed by: NURSE PRACTITIONER

## 2024-12-09 PROCEDURE — 1124F ACP DISCUSS-NO DSCNMKR DOCD: CPT | Performed by: NURSE PRACTITIONER

## 2024-12-09 PROCEDURE — G8484 FLU IMMUNIZE NO ADMIN: HCPCS | Performed by: NURSE PRACTITIONER

## 2024-12-09 PROCEDURE — G8427 DOCREV CUR MEDS BY ELIG CLIN: HCPCS | Performed by: NURSE PRACTITIONER

## 2024-12-09 PROCEDURE — 1036F TOBACCO NON-USER: CPT | Performed by: NURSE PRACTITIONER

## 2024-12-09 PROCEDURE — G8399 PT W/DXA RESULTS DOCUMENT: HCPCS | Performed by: NURSE PRACTITIONER

## 2024-12-09 PROCEDURE — 99214 OFFICE O/P EST MOD 30 MIN: CPT | Performed by: NURSE PRACTITIONER

## 2024-12-09 RX ORDER — LOSARTAN POTASSIUM 100 MG/1
100 TABLET ORAL DAILY
Qty: 90 TABLET | Refills: 3 | Status: SHIPPED | OUTPATIENT
Start: 2024-12-09

## 2024-12-09 NOTE — PROGRESS NOTES
Renal Progress Note    Patient: Karla Rossi MRN: [id number]  SSN: [id number]    YOB: 1945  Age: 79 y.o.  Sex: female      History of present illness  Karla Rossi is a pleasant 79 year old female with known history of HTN, CKD stage 3, hyponatremia, HLD. Valvular disease s/p valve replacement. She also has a history of CML followed by [facility] and on oral agents.   Patient is here for a follow-up of her CKD and hyponatremia.     Blood pressure is very well controlled in the office today. She is currently taking Cozaar 100 mg daily along with Spironolactone   She had a previous hospital admission for severe hyponatremia and had been started on salt tablets which have now been discontinued.  Her sodium last visit was 131 and is 135 today.  Baseline creatinine has been 1.3-1.4   Off hydrochlorothiazide for the past 1 year    Subjective:     Patient is  not giving any new complaints today.   No complaints of any swelling in lower extremities.   No complaints of abdominal or flank pains.  No complaints of voiding difficulties, or hematuria.   No complaints of shortness of breath.         Current Outpatient Medications   Medication Sig    simvastatin (ZOCOR) 20 MG tablet TAKE 1 TABLET BY MOUTH AT BEDTIME    losartan (COZAAR) 100 MG tablet Take 1 tablet by mouth once daily    acetaminophen (TYLENOL) 325 MG tablet Take 2 tablets by mouth every 6 hours as needed for Pain    aspirin 325 MG EC tablet Take 1 tablet by mouth daily    vitamin E 1000 units capsule Take 1 capsule by mouth daily    vitamin E 400 UNIT capsule Take 1 capsule by mouth daily    sodium chloride 1 g tablet Take 1 tablet by mouth daily    vitamin B-12 (CYANOCOBALAMIN) 1000 MCG tablet Take 1 tablet by mouth daily (Patient not taking: Reported on 6/26/2024)    Omega-3 Fatty Acids (SALMON OIL-1000 PO) Take 1 capsule by mouth daily (Patient not taking: Reported on 6/26/2024)    apixaban (ELIQUIS) 5 MG TABS tablet Take 1 tablet by mouth 2

## 2024-12-19 ENCOUNTER — HOSPITAL ENCOUNTER (EMERGENCY)
Age: 79
Discharge: HOME | End: 2024-12-19
Attending: FAMILY MEDICINE
Payer: MEDICARE

## 2024-12-19 ENCOUNTER — APPOINTMENT (OUTPATIENT)
Age: 79
End: 2024-12-19
Attending: FAMILY MEDICINE
Payer: MEDICARE

## 2024-12-19 ENCOUNTER — APPOINTMENT (OUTPATIENT)
Age: 79
End: 2024-12-19
Payer: MEDICARE

## 2024-12-19 ENCOUNTER — TELEPHONE (OUTPATIENT)
Dept: FAMILY MEDICINE CLINIC | Facility: CLINIC | Age: 79
End: 2024-12-19

## 2024-12-19 VITALS
HEART RATE: 88 BPM | WEIGHT: 160 LBS | RESPIRATION RATE: 16 BRPM | SYSTOLIC BLOOD PRESSURE: 123 MMHG | OXYGEN SATURATION: 99 % | TEMPERATURE: 97.7 F | DIASTOLIC BLOOD PRESSURE: 47 MMHG | HEIGHT: 65 IN | BODY MASS INDEX: 26.66 KG/M2

## 2024-12-19 DIAGNOSIS — N18.9 CHRONIC KIDNEY DISEASE, UNSPECIFIED CKD STAGE: ICD-10-CM

## 2024-12-19 DIAGNOSIS — R17 TOTAL BILIRUBIN, ELEVATED: ICD-10-CM

## 2024-12-19 DIAGNOSIS — M71.21 SYNOVIAL CYST OF RIGHT POPLITEAL SPACE: ICD-10-CM

## 2024-12-19 DIAGNOSIS — M79.604 RIGHT LEG PAIN: Primary | ICD-10-CM

## 2024-12-19 LAB
ALBUMIN SERPL-MCNC: 3.6 G/DL (ref 3.4–5)
ALBUMIN/GLOB SERPL: 1 (ref 0.8–1.7)
ALP SERPL-CCNC: 77 U/L (ref 45–117)
ALT SERPL-CCNC: 39 U/L (ref 13–56)
ANION GAP SERPL CALC-SCNC: 7 MMOL/L (ref 3–18)
AST SERPL W P-5'-P-CCNC: 31 U/L (ref 10–38)
BASOPHILS # BLD: 0.1 K/UL (ref 0–0.1)
BASOPHILS NFR BLD: 1 % (ref 0–2)
BILIRUB SERPL-MCNC: 2.1 MG/DL (ref 0.2–1)
BUN SERPL-MCNC: 27 MG/DL (ref 7–18)
BUN/CREAT SERPL: 17 (ref 12–20)
CA-I BLD-MCNC: 8.8 MG/DL (ref 8.5–10.1)
CHLORIDE SERPL-SCNC: 107 MMOL/L (ref 100–111)
CK SERPL-CCNC: 91 U/L (ref 26–192)
CO2 SERPL-SCNC: 21 MMOL/L (ref 21–32)
CREAT SERPL-MCNC: 1.62 MG/DL (ref 0.6–1.3)
DIFFERENTIAL METHOD BLD: ABNORMAL
ECHO BSA: 1.82 M2
EOSINOPHIL # BLD: 0 K/UL (ref 0–0.4)
EOSINOPHIL NFR BLD: 1 % (ref 0–5)
ERYTHROCYTE [DISTWIDTH] IN BLOOD BY AUTOMATED COUNT: 12.6 % (ref 11.6–14.5)
GLOBULIN SER CALC-MCNC: 3.5 G/DL (ref 2–4)
GLUCOSE SERPL-MCNC: 145 MG/DL (ref 74–99)
HCT VFR BLD AUTO: 29.7 % (ref 35–45)
HGB BLD-MCNC: 10.4 G/DL (ref 12–16)
IMM GRANULOCYTES # BLD AUTO: 0 K/UL (ref 0–0.04)
IMM GRANULOCYTES NFR BLD AUTO: 0 % (ref 0–0.5)
LYMPHOCYTES # BLD: 1.2 K/UL (ref 0.9–3.6)
LYMPHOCYTES NFR BLD: 15 % (ref 21–52)
MCH RBC QN AUTO: 30.1 PG (ref 24–34)
MCHC RBC AUTO-ENTMCNC: 35 G/DL (ref 31–37)
MCV RBC AUTO: 85.8 FL (ref 78–100)
MONOCYTES # BLD: 0.8 K/UL (ref 0.05–1.2)
MONOCYTES NFR BLD: 10 % (ref 3–10)
NEUTS SEG # BLD: 5.9 K/UL (ref 1.8–8)
NEUTS SEG NFR BLD: 73 % (ref 40–73)
NRBC # BLD: 0 K/UL (ref 0–0.01)
NRBC BLD-RTO: 0 PER 100 WBC
PLATELET # BLD AUTO: 251 K/UL (ref 135–420)
PMV BLD AUTO: 9.7 FL (ref 9.2–11.8)
POTASSIUM SERPL-SCNC: 4.1 MMOL/L (ref 3.5–5.5)
PROT SERPL-MCNC: 7.1 G/DL (ref 6.4–8.2)
RBC # BLD AUTO: 3.46 M/UL (ref 4.2–5.3)
SODIUM SERPL-SCNC: 135 MMOL/L (ref 136–145)
WBC # BLD AUTO: 7.9 K/UL (ref 4.6–13.2)

## 2024-12-19 PROCEDURE — 6370000000 HC RX 637 (ALT 250 FOR IP): Performed by: FAMILY MEDICINE

## 2024-12-19 PROCEDURE — 93971 EXTREMITY STUDY: CPT

## 2024-12-19 PROCEDURE — 80053 COMPREHEN METABOLIC PANEL: CPT

## 2024-12-19 PROCEDURE — 73590 X-RAY EXAM OF LOWER LEG: CPT

## 2024-12-19 PROCEDURE — 85025 COMPLETE CBC W/AUTO DIFF WBC: CPT

## 2024-12-19 PROCEDURE — 82550 ASSAY OF CK (CPK): CPT

## 2024-12-19 PROCEDURE — 99284 EMERGENCY DEPT VISIT MOD MDM: CPT

## 2024-12-19 RX ORDER — ACETAMINOPHEN 500 MG
1000 TABLET ORAL
Status: COMPLETED | OUTPATIENT
Start: 2024-12-19 | End: 2024-12-19

## 2024-12-19 RX ADMIN — ACETAMINOPHEN 1000 MG: 500 TABLET ORAL at 18:17

## 2024-12-19 ASSESSMENT — PAIN SCALES - GENERAL: PAINLEVEL_OUTOF10: 5

## 2024-12-19 ASSESSMENT — LIFESTYLE VARIABLES
HOW MANY STANDARD DRINKS CONTAINING ALCOHOL DO YOU HAVE ON A TYPICAL DAY: PATIENT DOES NOT DRINK
HOW OFTEN DO YOU HAVE A DRINK CONTAINING ALCOHOL: NEVER

## 2024-12-19 ASSESSMENT — PAIN - FUNCTIONAL ASSESSMENT: PAIN_FUNCTIONAL_ASSESSMENT: 0-10

## 2024-12-19 NOTE — ED TRIAGE NOTES
Pt to ER c/o right leg pain x 1 week  Reports that the pain began in her calf and now radiates behind her knee. Denies any injury

## 2024-12-19 NOTE — TELEPHONE ENCOUNTER
Pt called via nurse triage and reported that she thinks she has a blood clot going up her leg. She said it's been bothering her for a week. I spoke with the nurse and she suggested the pt go to the ER so that imaging could be done and they'd get the results sooner than if Mrs. Terry ordered testing. Pt aware and understood.

## 2024-12-19 NOTE — DISCHARGE INSTRUCTIONS
As we spoke, ultrasound shows what looks like a Baker's cyst.  At this point touch base with orthopedics regarding this.  As far as her leg pain continue to take the Tylenol for pain.  Follow-up with Amy Terry for additional studies.  But no blood clot is noted.  Your kidney functions are elevated but this appears to be chronic and the same goes through with your total bilirubin follow-up with your primary care provider to ensure that you are at your optimal.  Return to the emergency department if you have any questions or concerns.  I have given you the name of Dr. Cook orthopedic physician.  Who may be able to help you out with these leg pain.

## 2024-12-19 NOTE — ED PROVIDER NOTES
regarding her findings, including her total bilirubin which is elevated at at 2.1, she states it is always elevated and indeed February of last year it was 1.7.  Might be related to her bruising.  Will have her follow-up with her primary care provider.  Also made her aware her kidney functions are not that good but she is on fluid restrictions.  This may be playing a role as well.  Will have her follow-up with her primary care provider regarding this.  We went over the ultrasound which is negative for any DVT but questions about possible area of a Baker's cyst which might be complicating this issue will give her some Tylenol and go ahead and discharge the patient.  We discussed her x-ray as well [WS]      ED Course User Index  [WS] Celio Barton DO           CONSULTS:  None    PROCEDURES:  Unless otherwise noted below, none     Procedures      FINAL IMPRESSION      1. Right leg pain    2. Chronic kidney disease, unspecified CKD stage    3. Total bilirubin, elevated    4. Synovial cyst of right popliteal space          DISPOSITION/PLAN   DISPOSITION Decision To Discharge 12/19/2024 05:54:56 PM   DISPOSITION CONDITION Stable           PATIENT REFERRED TO:  Mandeep Cook MD  [address] [place], [place] [id number] [phone]    Schedule an appointment as soon as possible for a visit       Winnie Terry, APRN - CNP  [id number] [id number] [phone]    Schedule an appointment as soon as possible for a visit         DISCHARGE MEDICATIONS:  New Prescriptions    No medications on file     Controlled Substances Monitoring:          No data to display                (Please note that portions of this note were completed with a voice recognition program.  Efforts were made to edit the dictations but occasionally words are mis-transcribed.)    Celio Barton DO (electronically signed)  Attending Emergency Physician           Celio Barton DO  12/19/24 [id number]

## 2024-12-20 ENCOUNTER — TELEPHONE (OUTPATIENT)
Dept: FAMILY MEDICINE CLINIC | Facility: CLINIC | Age: 79
End: 2024-12-20

## 2024-12-20 NOTE — TELEPHONE ENCOUNTER
Pt went to the ER yesterday thinking she had a blood clot. They determined that she has a baker's cyst (?). She can't get in with Dr. Cook for a week and she's wondering if she needs to be on any kind of medication. She wanted to speak to Amy, but I told her I would send a message.

## 2024-12-27 ENCOUNTER — OFFICE VISIT (OUTPATIENT)
Age: 79
End: 2024-12-27
Payer: MEDICARE

## 2024-12-27 VITALS — WEIGHT: 160 LBS | HEIGHT: 65 IN | BODY MASS INDEX: 26.66 KG/M2

## 2024-12-27 DIAGNOSIS — M17.11 OSTEOARTHRITIS OF RIGHT KNEE, UNSPECIFIED OSTEOARTHRITIS TYPE: ICD-10-CM

## 2024-12-27 DIAGNOSIS — M25.561 RIGHT KNEE PAIN, UNSPECIFIED CHRONICITY: Primary | ICD-10-CM

## 2024-12-27 DIAGNOSIS — M79.605 LEFT LEG PAIN: ICD-10-CM

## 2024-12-27 PROCEDURE — 1036F TOBACCO NON-USER: CPT

## 2024-12-27 PROCEDURE — G8399 PT W/DXA RESULTS DOCUMENT: HCPCS

## 2024-12-27 PROCEDURE — 1124F ACP DISCUSS-NO DSCNMKR DOCD: CPT

## 2024-12-27 PROCEDURE — G8427 DOCREV CUR MEDS BY ELIG CLIN: HCPCS

## 2024-12-27 PROCEDURE — 1159F MED LIST DOCD IN RCRD: CPT

## 2024-12-27 PROCEDURE — 1090F PRES/ABSN URINE INCON ASSESS: CPT

## 2024-12-27 PROCEDURE — G8417 CALC BMI ABV UP PARAM F/U: HCPCS

## 2024-12-27 PROCEDURE — 99213 OFFICE O/P EST LOW 20 MIN: CPT

## 2024-12-27 PROCEDURE — G8484 FLU IMMUNIZE NO ADMIN: HCPCS

## 2024-12-27 NOTE — PROGRESS NOTES
patient was advised to utilize Tylenol or OTC NSAIDS as long as it is not medically contraindicated.     Return to Office:   Follow-up and Dispositions    Return if symptoms worsen or fail to improve.        Scribed by ALBERTA Valentin - CNP as dictated by ALBERTA Fung, CNP.  Documentation, performed by, True and Accepted ALBERTA Fung, CNP

## 2025-01-30 DIAGNOSIS — I25.10 CORONARY ARTERY DISEASE INVOLVING NATIVE CORONARY ARTERY OF NATIVE HEART WITHOUT ANGINA PECTORIS: ICD-10-CM

## 2025-01-30 NOTE — TELEPHONE ENCOUNTER
Pt need a refill of Eliquis sent to [facility].  Their fax number is [phone]. Their phone number is [phone].

## 2025-01-31 ENCOUNTER — TELEPHONE (OUTPATIENT)
Dept: FAMILY MEDICINE CLINIC | Facility: CLINIC | Age: 80
End: 2025-01-31

## 2025-01-31 DIAGNOSIS — I25.10 CORONARY ARTERY DISEASE INVOLVING NATIVE CORONARY ARTERY OF NATIVE HEART WITHOUT ANGINA PECTORIS: ICD-10-CM

## 2025-01-31 NOTE — TELEPHONE ENCOUNTER
Pt needs Eliquis sent to [facility]? I sent a message on yesterday with the fax number. She called this morning about it being sent to [facility].

## 2025-02-04 RX ORDER — SIMVASTATIN 20 MG
TABLET ORAL
Qty: 90 TABLET | Refills: 0 | Status: SHIPPED | OUTPATIENT
Start: 2025-02-04

## 2025-02-04 NOTE — TELEPHONE ENCOUNTER
Patient called requesting a refill on this medication. I can not see who the original prescriber is and I am not sure if this can be filled by our office. Please advise.

## 2025-02-05 RX ORDER — CLOPIDOGREL BISULFATE 75 MG/1
75 TABLET ORAL DAILY
Qty: 30 TABLET | OUTPATIENT
Start: 2025-02-05

## 2025-02-27 ENCOUNTER — HOSPITAL ENCOUNTER (OUTPATIENT)
Age: 80
Setting detail: SPECIMEN
Discharge: HOME | End: 2025-02-27
Payer: MEDICARE

## 2025-02-27 DIAGNOSIS — N18.31 CHRONIC KIDNEY DISEASE, STAGE 3A (HCC): ICD-10-CM

## 2025-02-27 LAB
ALBUMIN SERPL-MCNC: 3.9 G/DL (ref 3.4–5)
ANION GAP SERPL CALC-SCNC: 5 MMOL/L (ref 3–18)
APPEARANCE UR: CLEAR
BACTERIA URNS QL MICRO: NEGATIVE /HPF
BILIRUB UR QL: NEGATIVE
BUN SERPL-MCNC: 31 MG/DL (ref 7–18)
BUN/CREAT SERPL: 20 (ref 12–20)
CA-I BLD-MCNC: 9.1 MG/DL (ref 8.5–10.1)
CHLORIDE SERPL-SCNC: 108 MMOL/L (ref 100–111)
CO2 SERPL-SCNC: 23 MMOL/L (ref 21–32)
COLOR UR: YELLOW
CREAT SERPL-MCNC: 1.54 MG/DL (ref 0.6–1.3)
ERYTHROCYTE [DISTWIDTH] IN BLOOD BY AUTOMATED COUNT: 12.6 % (ref 11.6–14.5)
GLUCOSE SERPL-MCNC: 111 MG/DL (ref 74–99)
GLUCOSE UR STRIP.AUTO-MCNC: NEGATIVE MG/DL
HCT VFR BLD AUTO: 31.6 % (ref 35–45)
HGB BLD-MCNC: 10.5 G/DL (ref 12–16)
HGB UR QL STRIP: NEGATIVE
KETONES UR QL STRIP.AUTO: ABNORMAL MG/DL
LEUKOCYTE ESTERASE UR QL STRIP.AUTO: ABNORMAL
MCH RBC QN AUTO: 29.6 PG (ref 24–34)
MCHC RBC AUTO-ENTMCNC: 33.2 G/DL (ref 31–37)
MCV RBC AUTO: 89 FL (ref 78–100)
NITRITE UR QL STRIP.AUTO: NEGATIVE
NRBC # BLD: 0 K/UL (ref 0–0.01)
NRBC BLD-RTO: 0 PER 100 WBC
PH UR STRIP: 5 (ref 5–8)
PHOSPHATE SERPL-MCNC: 4 MG/DL (ref 2.5–4.9)
PLATELET # BLD AUTO: 271 K/UL (ref 135–420)
PMV BLD AUTO: 9.7 FL (ref 9.2–11.8)
POTASSIUM SERPL-SCNC: 4.8 MMOL/L (ref 3.5–5.5)
PROT UR STRIP-MCNC: NEGATIVE MG/DL
RBC # BLD AUTO: 3.55 M/UL (ref 4.2–5.3)
RBC #/AREA URNS HPF: NORMAL /HPF (ref 0–2)
SODIUM SERPL-SCNC: 136 MMOL/L (ref 136–145)
SP GR UR REFRACTOMETRY: 1.02 (ref 1–1.03)
UROBILINOGEN UR QL STRIP.AUTO: 0.2 EU/DL (ref 0.2–1)
WBC # BLD AUTO: 8.8 K/UL (ref 4.6–13.2)
WBC URNS QL MICRO: NORMAL /HPF (ref 0–4)

## 2025-02-27 PROCEDURE — 83970 ASSAY OF PARATHORMONE: CPT

## 2025-02-27 PROCEDURE — 83935 ASSAY OF URINE OSMOLALITY: CPT

## 2025-02-27 PROCEDURE — 81001 URINALYSIS AUTO W/SCOPE: CPT

## 2025-02-27 PROCEDURE — 84300 ASSAY OF URINE SODIUM: CPT

## 2025-02-27 PROCEDURE — 84156 ASSAY OF PROTEIN URINE: CPT

## 2025-02-27 PROCEDURE — 80069 RENAL FUNCTION PANEL: CPT

## 2025-02-27 PROCEDURE — 82306 VITAMIN D 25 HYDROXY: CPT

## 2025-02-27 PROCEDURE — 85027 COMPLETE CBC AUTOMATED: CPT

## 2025-02-27 PROCEDURE — 82570 ASSAY OF URINE CREATININE: CPT

## 2025-02-28 LAB
25(OH)D3 SERPL-MCNC: 65 NG/ML (ref 30–100)
CA-I BLD-MCNC: 9 MG/DL (ref 8.5–10.1)
CREAT UR-MCNC: 201 MG/DL (ref 30–125)
OSMOLALITY UR: 657 MOSM/KG H2O
PROT UR-MCNC: 18 MG/DL
PROT/CREAT UR-RTO: 0.1
PTH-INTACT SERPL-MCNC: 38.8 PG/ML (ref 18.4–88)
SODIUM UR-SCNC: 71 MMOL/L (ref 20–110)

## 2025-03-12 ENCOUNTER — OFFICE VISIT (OUTPATIENT)
Age: 80
End: 2025-03-12
Payer: MEDICARE

## 2025-03-12 VITALS
SYSTOLIC BLOOD PRESSURE: 125 MMHG | HEART RATE: 73 BPM | HEIGHT: 65 IN | BODY MASS INDEX: 26.42 KG/M2 | WEIGHT: 158.6 LBS | DIASTOLIC BLOOD PRESSURE: 70 MMHG | OXYGEN SATURATION: 96 %

## 2025-03-12 DIAGNOSIS — E83.42 HYPOMAGNESEMIA: ICD-10-CM

## 2025-03-12 DIAGNOSIS — N18.31 CHRONIC KIDNEY DISEASE, STAGE 3A (HCC): Primary | ICD-10-CM

## 2025-03-12 PROCEDURE — 99213 OFFICE O/P EST LOW 20 MIN: CPT | Performed by: NURSE PRACTITIONER

## 2025-03-12 PROCEDURE — 1124F ACP DISCUSS-NO DSCNMKR DOCD: CPT | Performed by: NURSE PRACTITIONER

## 2025-03-12 PROCEDURE — 1090F PRES/ABSN URINE INCON ASSESS: CPT | Performed by: NURSE PRACTITIONER

## 2025-03-12 PROCEDURE — 1126F AMNT PAIN NOTED NONE PRSNT: CPT | Performed by: NURSE PRACTITIONER

## 2025-03-12 PROCEDURE — G8428 CUR MEDS NOT DOCUMENT: HCPCS | Performed by: NURSE PRACTITIONER

## 2025-03-12 PROCEDURE — G8399 PT W/DXA RESULTS DOCUMENT: HCPCS | Performed by: NURSE PRACTITIONER

## 2025-03-12 PROCEDURE — 1036F TOBACCO NON-USER: CPT | Performed by: NURSE PRACTITIONER

## 2025-03-12 PROCEDURE — G8417 CALC BMI ABV UP PARAM F/U: HCPCS | Performed by: NURSE PRACTITIONER

## 2025-03-12 NOTE — PROGRESS NOTES
Renal Progress Note    Patient: Karla Rossi MRN: [id number]  SSN: [id number]    YOB: 1945  Age: 79 y.o.  Sex: female        History of present illness  Karla Rossi is a pleasant 79 year old female with known history of HTN, CKD stage 3, hyponatremia, HLD. Valvular disease s/p valve replacement. She also has a history of CML followed by [facility] and on oral agents.   Patient is here for a follow-up of her CKD and hyponatremia.     Blood pressure is very well controlled in the office today. She is currently taking Cozaar 100 mg daily along with Spironolactone   She had a previous hospital admission for severe hyponatremia and had been started on salt tablets which have now been discontinued.  Her sodium last visit was 131 and is 135 today.  Baseline creatinine has been 1.3-1.4   Off hydrochlorothiazide for the past 1 year       Subjective:    Patient is not giving any new complaints today.   No complaints of any swelling in lower extremities.   No complaints of abdominal or flank pains.  No complaints of voiding difficulties, or hematuria.   No complaints of shortness of breath.         Current Outpatient Medications   Medication Sig    simvastatin (ZOCOR) 20 MG tablet TAKE 1 TABLET BY MOUTH AT BEDTIME    apixaban (ELIQUIS) 5 MG TABS tablet Take 1 tablet by mouth 2 times daily    losartan (COZAAR) 100 MG tablet Take 1 tablet by mouth daily    vitamin E 400 UNIT capsule Take 1 capsule by mouth daily    vitamin B-12 (CYANOCOBALAMIN) 1000 MCG tablet Take 1 tablet by mouth daily    Vitamin D-Vitamin K (K2-D3 5000) 5000-90 UNIT-MCG CAPS Take 1 capsule by mouth daily    magnesium (MAGNESIUM-OXIDE) 250 MG TABS tablet Take 1 tablet by mouth daily    Multiple Vitamins-Minerals (MULTIVITAMIN ADULT) CHEW Take 1 tablet by mouth daily    spironolactone (ALDACTONE) 25 MG tablet Take 1 tablet by mouth once daily (Patient taking differently: Take 2 tablets by mouth daily)    B Complex Vitamins (VITAMIN B COMPLEX

## 2025-03-12 NOTE — PROGRESS NOTES
1. Have you been to the ER, urgent care clinic since your last visit?  Hospitalized since your last visit?No    2. Have you seen or consulted any other health care providers outside of the [facility] since your last visit?  Include any pap smears or colon screening. Yes [facility] and [facility]

## 2025-05-13 ENCOUNTER — TRANSCRIBE ORDERS (OUTPATIENT)
Age: 80
End: 2025-05-13

## 2025-05-13 ENCOUNTER — HOSPITAL ENCOUNTER (OUTPATIENT)
Age: 80
Setting detail: SPECIMEN
Discharge: HOME | End: 2025-05-16
Payer: MEDICARE

## 2025-05-13 DIAGNOSIS — C92.10 CHRONIC MYELOID LEUKEMIA (HCC): Primary | ICD-10-CM

## 2025-05-13 DIAGNOSIS — C92.10 CHRONIC MYELOID LEUKEMIA (HCC): ICD-10-CM

## 2025-05-13 DIAGNOSIS — E87.5 HYPERPOTASSEMIA: ICD-10-CM

## 2025-05-13 LAB
ANION GAP SERPL CALC-SCNC: 4 MMOL/L (ref 3–18)
BUN SERPL-MCNC: 30 MG/DL (ref 7–18)
BUN/CREAT SERPL: 19 (ref 12–20)
CA-I BLD-MCNC: 8.9 MG/DL (ref 8.5–10.1)
CHLORIDE SERPL-SCNC: 110 MMOL/L (ref 100–111)
CO2 SERPL-SCNC: 21 MMOL/L (ref 21–32)
CREAT SERPL-MCNC: 1.62 MG/DL (ref 0.6–1.3)
GLUCOSE SERPL-MCNC: 103 MG/DL (ref 74–99)
POTASSIUM SERPL-SCNC: 5.7 MMOL/L (ref 3.5–5.5)
SODIUM SERPL-SCNC: 135 MMOL/L (ref 136–145)

## 2025-05-13 PROCEDURE — 80048 BASIC METABOLIC PNL TOTAL CA: CPT

## 2025-05-13 PROCEDURE — 36415 COLL VENOUS BLD VENIPUNCTURE: CPT

## 2025-05-15 RX ORDER — SIMVASTATIN 20 MG
20 TABLET ORAL NIGHTLY
Qty: 90 TABLET | Refills: 0 | Status: SHIPPED | OUTPATIENT
Start: 2025-05-15

## 2025-05-27 ENCOUNTER — CLINICAL SUPPORT (OUTPATIENT)
Dept: FAMILY MEDICINE CLINIC | Facility: CLINIC | Age: 80
End: 2025-05-27
Payer: MEDICARE

## 2025-05-27 DIAGNOSIS — Z13.29 THYROID DISORDER SCREEN: ICD-10-CM

## 2025-05-27 DIAGNOSIS — E78.5 HYPERLIPIDEMIA, UNSPECIFIED HYPERLIPIDEMIA TYPE: ICD-10-CM

## 2025-05-27 DIAGNOSIS — E55.9 VITAMIN D DEFICIENCY: ICD-10-CM

## 2025-05-27 DIAGNOSIS — E53.8 COBALAMIN DEFICIENCY: ICD-10-CM

## 2025-05-27 DIAGNOSIS — Z79.01 CURRENT USE OF LONG TERM ANTICOAGULATION: ICD-10-CM

## 2025-05-27 DIAGNOSIS — N18.31 CHRONIC KIDNEY DISEASE, STAGE 3A (HCC): Primary | ICD-10-CM

## 2025-05-27 PROCEDURE — 36415 COLL VENOUS BLD VENIPUNCTURE: CPT | Performed by: NURSE PRACTITIONER

## 2025-05-27 PROCEDURE — 99211 OFF/OP EST MAY X REQ PHY/QHP: CPT | Performed by: NURSE PRACTITIONER

## 2025-05-27 NOTE — PROGRESS NOTES
Verbal order from provider to draw labs in office    Patient presents for lab draw ordered by:    Ordering Provider:  christin  Ordering Department/Practice:  [facility]  Phone:  [phone]  Date Ordered:  [id number]    Labs were drawn and sent to [facility] by Maryjo Deluca LPN:    The following tubes were sent:    Gold  ( 2) and Lavender  ( 1)    Draw site left brachial.  Patient tolerated draw with no distress.

## 2025-05-28 LAB
25(OH)D3+25(OH)D2 SERPL-MCNC: 78.5 NG/ML (ref 30–100)
ALBUMIN SERPL-MCNC: 4.4 G/DL (ref 3.8–4.8)
ALP SERPL-CCNC: 116 IU/L (ref 44–121)
ALT SERPL-CCNC: 40 IU/L (ref 0–32)
AST SERPL-CCNC: 42 IU/L (ref 0–40)
BASOPHILS # BLD AUTO: 0.1 X10E3/UL (ref 0–0.2)
BASOPHILS NFR BLD AUTO: 1 %
BILIRUB SERPL-MCNC: 1.6 MG/DL (ref 0–1.2)
BUN SERPL-MCNC: 23 MG/DL (ref 8–27)
BUN/CREAT SERPL: 16 (ref 12–28)
CALCIUM SERPL-MCNC: 9.1 MG/DL (ref 8.7–10.3)
CHLORIDE SERPL-SCNC: 110 MMOL/L (ref 96–106)
CHOLEST SERPL-MCNC: 178 MG/DL (ref 100–199)
CO2 SERPL-SCNC: 16 MMOL/L (ref 20–29)
CREAT SERPL-MCNC: 1.42 MG/DL (ref 0.57–1)
EGFRCR SERPLBLD CKD-EPI 2021: 38 ML/MIN/1.73
EOSINOPHIL # BLD AUTO: 0.1 X10E3/UL (ref 0–0.4)
EOSINOPHIL NFR BLD AUTO: 1 %
ERYTHROCYTE [DISTWIDTH] IN BLOOD BY AUTOMATED COUNT: 12.4 % (ref 11.7–15.4)
GLOBULIN SER CALC-MCNC: 2.3 G/DL (ref 1.5–4.5)
GLUCOSE SERPL-MCNC: 97 MG/DL (ref 70–99)
HCT VFR BLD AUTO: 32.3 % (ref 34–46.6)
HDLC SERPL-MCNC: 67 MG/DL
HGB BLD-MCNC: 10.4 G/DL (ref 11.1–15.9)
IMM GRANULOCYTES # BLD AUTO: 0 X10E3/UL (ref 0–0.1)
IMM GRANULOCYTES NFR BLD AUTO: 0 %
LDLC SERPL CALC-MCNC: 93 MG/DL (ref 0–99)
LYMPHOCYTES # BLD AUTO: 1.3 X10E3/UL (ref 0.7–3.1)
LYMPHOCYTES NFR BLD AUTO: 19 %
MCH RBC QN AUTO: 29.9 PG (ref 26.6–33)
MCHC RBC AUTO-ENTMCNC: 32.2 G/DL (ref 31.5–35.7)
MCV RBC AUTO: 93 FL (ref 79–97)
MONOCYTES # BLD AUTO: 0.6 X10E3/UL (ref 0.1–0.9)
MONOCYTES NFR BLD AUTO: 8 %
NEUTROPHILS # BLD AUTO: 4.9 X10E3/UL (ref 1.4–7)
NEUTROPHILS NFR BLD AUTO: 71 %
PLATELET # BLD AUTO: 237 X10E3/UL (ref 150–450)
POTASSIUM SERPL-SCNC: 4.7 MMOL/L (ref 3.5–5.2)
PROT SERPL-MCNC: 6.7 G/DL (ref 6–8.5)
RBC # BLD AUTO: 3.48 X10E6/UL (ref 3.77–5.28)
SODIUM SERPL-SCNC: 139 MMOL/L (ref 134–144)
SPECIMEN STATUS REPORT: NORMAL
TRIGL SERPL-MCNC: 98 MG/DL (ref 0–149)
TSH SERPL DL<=0.005 MIU/L-ACNC: 3.47 UIU/ML (ref 0.45–4.5)
VIT B12 SERPL-MCNC: >2000 PG/ML (ref 232–1245)
VLDLC SERPL CALC-MCNC: 18 MG/DL (ref 5–40)
WBC # BLD AUTO: 7 X10E3/UL (ref 3.4–10.8)

## 2025-05-29 ENCOUNTER — RESULTS FOLLOW-UP (OUTPATIENT)
Dept: FAMILY MEDICINE CLINIC | Facility: CLINIC | Age: 80
End: 2025-05-29

## 2025-06-01 SDOH — HEALTH STABILITY: PHYSICAL HEALTH: ON AVERAGE, HOW MANY MINUTES DO YOU ENGAGE IN EXERCISE AT THIS LEVEL?: 20 MIN

## 2025-06-01 SDOH — ECONOMIC STABILITY: INCOME INSECURITY: IN THE LAST 12 MONTHS, WAS THERE A TIME WHEN YOU WERE NOT ABLE TO PAY THE MORTGAGE OR RENT ON TIME?: NO

## 2025-06-01 SDOH — ECONOMIC STABILITY: FOOD INSECURITY: WITHIN THE PAST 12 MONTHS, YOU WORRIED THAT YOUR FOOD WOULD RUN OUT BEFORE YOU GOT MONEY TO BUY MORE.: NEVER TRUE

## 2025-06-01 SDOH — ECONOMIC STABILITY: FOOD INSECURITY: WITHIN THE PAST 12 MONTHS, THE FOOD YOU BOUGHT JUST DIDN'T LAST AND YOU DIDN'T HAVE MONEY TO GET MORE.: NEVER TRUE

## 2025-06-01 SDOH — HEALTH STABILITY: PHYSICAL HEALTH: ON AVERAGE, HOW MANY DAYS PER WEEK DO YOU ENGAGE IN MODERATE TO STRENUOUS EXERCISE (LIKE A BRISK WALK)?: 2 DAYS

## 2025-06-01 SDOH — ECONOMIC STABILITY: TRANSPORTATION INSECURITY
IN THE PAST 12 MONTHS, HAS THE LACK OF TRANSPORTATION KEPT YOU FROM MEDICAL APPOINTMENTS OR FROM GETTING MEDICATIONS?: NO

## 2025-06-01 ASSESSMENT — PATIENT HEALTH QUESTIONNAIRE - PHQ9
1. LITTLE INTEREST OR PLEASURE IN DOING THINGS: NOT AT ALL
2. FEELING DOWN, DEPRESSED OR HOPELESS: NOT AT ALL
SUM OF ALL RESPONSES TO PHQ QUESTIONS 1-9: 0

## 2025-06-01 ASSESSMENT — LIFESTYLE VARIABLES
HOW OFTEN DO YOU HAVE A DRINK CONTAINING ALCOHOL: 1
HOW MANY STANDARD DRINKS CONTAINING ALCOHOL DO YOU HAVE ON A TYPICAL DAY: PATIENT DOES NOT DRINK
HOW OFTEN DO YOU HAVE A DRINK CONTAINING ALCOHOL: NEVER
HOW OFTEN DO YOU HAVE SIX OR MORE DRINKS ON ONE OCCASION: 1
HOW MANY STANDARD DRINKS CONTAINING ALCOHOL DO YOU HAVE ON A TYPICAL DAY: 0

## 2025-06-02 ENCOUNTER — OFFICE VISIT (OUTPATIENT)
Dept: FAMILY MEDICINE CLINIC | Facility: CLINIC | Age: 80
End: 2025-06-02
Payer: MEDICARE

## 2025-06-02 VITALS
DIASTOLIC BLOOD PRESSURE: 77 MMHG | RESPIRATION RATE: 18 BRPM | OXYGEN SATURATION: 98 % | HEART RATE: 82 BPM | BODY MASS INDEX: 26.29 KG/M2 | TEMPERATURE: 98.7 F | SYSTOLIC BLOOD PRESSURE: 131 MMHG | HEIGHT: 65 IN | WEIGHT: 157.8 LBS

## 2025-06-02 DIAGNOSIS — E53.8 COBALAMIN DEFICIENCY: ICD-10-CM

## 2025-06-02 DIAGNOSIS — E78.2 MIXED HYPERLIPIDEMIA: ICD-10-CM

## 2025-06-02 DIAGNOSIS — Z00.00 MEDICARE ANNUAL WELLNESS VISIT, SUBSEQUENT: Primary | ICD-10-CM

## 2025-06-02 DIAGNOSIS — N18.32 STAGE 3B CHRONIC KIDNEY DISEASE (HCC): ICD-10-CM

## 2025-06-02 DIAGNOSIS — C92.10 CHRONIC MYELOID LEUKEMIA, BCR/ABL-POSITIVE, NOT HAVING ACHIEVED REMISSION (HCC): ICD-10-CM

## 2025-06-02 DIAGNOSIS — Z95.2 HISTORY OF TRANSCATHETER AORTIC VALVE REPLACEMENT (TAVR): ICD-10-CM

## 2025-06-02 DIAGNOSIS — D47.1 MYELOPROLIFERATIVE DISORDER (HCC): ICD-10-CM

## 2025-06-02 DIAGNOSIS — E55.9 VITAMIN D DEFICIENCY: ICD-10-CM

## 2025-06-02 PROCEDURE — 1159F MED LIST DOCD IN RCRD: CPT | Performed by: NURSE PRACTITIONER

## 2025-06-02 PROCEDURE — 1160F RVW MEDS BY RX/DR IN RCRD: CPT | Performed by: NURSE PRACTITIONER

## 2025-06-02 PROCEDURE — 99214 OFFICE O/P EST MOD 30 MIN: CPT | Performed by: NURSE PRACTITIONER

## 2025-06-02 PROCEDURE — G8427 DOCREV CUR MEDS BY ELIG CLIN: HCPCS | Performed by: NURSE PRACTITIONER

## 2025-06-02 PROCEDURE — 1126F AMNT PAIN NOTED NONE PRSNT: CPT | Performed by: NURSE PRACTITIONER

## 2025-06-02 PROCEDURE — G0439 PPPS, SUBSEQ VISIT: HCPCS | Performed by: NURSE PRACTITIONER

## 2025-06-02 PROCEDURE — 1036F TOBACCO NON-USER: CPT | Performed by: NURSE PRACTITIONER

## 2025-06-02 PROCEDURE — G8399 PT W/DXA RESULTS DOCUMENT: HCPCS | Performed by: NURSE PRACTITIONER

## 2025-06-02 PROCEDURE — G8417 CALC BMI ABV UP PARAM F/U: HCPCS | Performed by: NURSE PRACTITIONER

## 2025-06-02 PROCEDURE — 1124F ACP DISCUSS-NO DSCNMKR DOCD: CPT | Performed by: NURSE PRACTITIONER

## 2025-06-02 PROCEDURE — 1090F PRES/ABSN URINE INCON ASSESS: CPT | Performed by: NURSE PRACTITIONER

## 2025-06-02 ASSESSMENT — ENCOUNTER SYMPTOMS
SHORTNESS OF BREATH: 0
CHEST TIGHTNESS: 0

## 2025-06-02 NOTE — PROGRESS NOTES
Karla BRITTNI Rossi presents today for   Chief Complaint   Patient presents with    Medicare AWV       Is someone accompanying this pt? no    Is the patient using any DME equipment during OV? no    Depression Screenin/1/2025    11:19 PM 2024    10:39 AM 2024     9:13 PM 2023    11:31 AM 10/19/2022    10:24 AM 2022     2:54 PM 2022    10:24 AM   PHQ-9 Questionaire   Little interest or pleasure in doing things 0 0 0 0 0 0 0   Feeling down, depressed, or hopeless 0 0 0 0 0 0 0   PHQ-9 Total Score 0  0 0 0 0 0 0       Patient-reported       Fall Risk      2025    11:19 PM 2024    10:39 AM 2024     9:13 PM 2023    11:31 AM   Fall Risk   Do you feel unsteady or are you worried about falling?  no no no no   2 or more falls in past year? no no no no   Fall with injury in past year? no no no yes        Health Maintenance reviewed and discussed and ordered per Provider.    Health Maintenance Due   Topic Date Due    COVID-19 Vaccine (1) Never done    Shingles vaccine (1 of 2) 2013    Respiratory Syncytial Virus (RSV) Pregnant or age 60 yrs+ (1 - 1-dose 75+ series) Never done   .        \"Have you been to the ER, urgent care clinic since your last visit?  Hospitalized since your last visit?\"    NO    “Have you seen or consulted any other health care providers outside our system since your last visit?”    NO    NO        Click Here for Release of Records Request   
mouth 2 times daily     cetirizine 10 MG tablet  Commonly known as: ZYRTEC     Jardiance 10 MG tablet  Generic drug: empagliflozin     K2-D3 5000 5000-90 UNIT-MCG Caps     losartan 100 MG tablet  Commonly known as: COZAAR  Take 1 tablet by mouth daily     magnesium 250 MG Tabs tablet  Commonly known as: MAGNESIUM-OXIDE     Multivitamin Adult Chew     simvastatin 20 MG tablet  Commonly known as: ZOCOR  TAKE 1 TABLET BY MOUTH AT BEDTIME     Tasigna 150 MG Caps capsule  Generic drug: nilotinib     turmeric 500 MG Caps     VITAMIN B COMPLEX PO     vitamin B-12 1000 MCG tablet  Commonly known as: CYANOCOBALAMIN     vitamin D 125 MCG (5000 UT) Caps capsule  Commonly known as: CHOLECALCIFEROL     vitamin E 400 UNIT capsule            STOP taking these medications      clopidogrel 75 MG tablet  Commonly known as: PLAVIX  Stopped by: ALBERTA Rosales CNP     sodium chloride 1 g tablet  Stopped by: ALBERTA Rosales CNP     spironolactone 25 MG tablet  Commonly known as: ALDACTONE  Stopped by: ALBERTA Rosales CNP, APRN - CNP                          Disclaimer:    I have discussed the diagnosis with the patient and the intended plan as seen above.The patient understands our medical plan. The risks, benefits and significant side effects of all medications have been reviewed. Anticipated time course and progression of condition reviewed. All questions have been addressed.  She received an after visit summary, with information reviewed, and questions answered.      Where appropriate, she is instructed to call the clinic if she has not been notified either by phone or through [employer] with the results of her tests or with an appointment plan for any referrals within 1 week(s). The patient  is to call if her condition worsens or fails to improve or if significant side effects are experienced.       ALBERTA Porras CNP   Medicare Annual Wellness Visit    Karla Rossi is here for Medicare

## 2025-06-02 NOTE — PATIENT INSTRUCTIONS
Learning About Being Active as an Older Adult  Why is being active important as you get older?     Being active is one of the best things you can do for your health. And it's never too late to start. Being active--or getting active, if you aren't already--has definite benefits. It can:  Give you more energy,  Keep your mind sharp.  Improve balance to reduce your risk of falls.  Help you manage chronic illness with fewer medicines.  No matter how old you are, how fit you are, or what health problems you have, there is a form of activity that will work for you. And the more physical activity you can do, the better your overall health will be.  What kinds of activity can help you stay healthy?  Being more active will make your daily activities easier. Physical activity includes planned exercise and things you do in daily life. There are four types of activity:  Aerobic.  Doing aerobic activity makes your heart and lungs strong.  Includes walking, dancing, and gardening.  Aim for at least 2½ hours spread throughout the week.  It improves your energy and can help you sleep better.  Muscle-strengthening.  This type of activity can help maintain muscle and strengthen bones.  Includes climbing stairs, using resistance bands, and lifting or carrying heavy loads.  Aim for at least twice a week.  It can help protect the knees and other joints.  Stretching.  Stretching gives you better range of motion in joints and muscles.  Includes upper arm stretches, calf stretches, and gentle yoga.  Aim for at least twice a week, preferably after your muscles are warmed up from other activities.  It can help you function better in daily life.  Balancing.  This helps you stay coordinated and have good posture.  Includes heel-to-toe walking, sunitha chi, and certain types of yoga.  Aim for at least 3 days a week.  It can reduce your risk of falling.  Even if you have a hard time meeting the recommendations, it's better to be more active

## 2025-07-09 ENCOUNTER — HOSPITAL ENCOUNTER (OUTPATIENT)
Age: 80
Setting detail: SPECIMEN
Discharge: HOME | End: 2025-07-12
Payer: MEDICARE

## 2025-07-09 DIAGNOSIS — N18.31 CHRONIC KIDNEY DISEASE, STAGE 3A (HCC): ICD-10-CM

## 2025-07-09 DIAGNOSIS — E83.42 HYPOMAGNESEMIA: ICD-10-CM

## 2025-07-09 LAB
ALBUMIN SERPL-MCNC: 3.6 G/DL (ref 3.4–5)
ANION GAP SERPL CALC-SCNC: 11 MMOL/L
APPEARANCE UR: CLEAR
BACTERIA URNS QL MICRO: ABNORMAL /HPF
BILIRUB UR QL: NEGATIVE
BUN SERPL-MCNC: 16 MG/DL (ref 6–23)
BUN/CREAT SERPL: 14
CA-I BLD-MCNC: 9.7 MG/DL (ref 8.5–10.1)
CHLORIDE SERPL-SCNC: 106 MMOL/L (ref 98–107)
CO2 SERPL-SCNC: 23 MMOL/L (ref 21–32)
COLOR UR: YELLOW
CREAT SERPL-MCNC: 1.18 MG/DL (ref 0.6–1.3)
EPITH CASTS URNS QL MICRO: ABNORMAL /LPF (ref 0–20)
ERYTHROCYTE [DISTWIDTH] IN BLOOD BY AUTOMATED COUNT: 13 % (ref 11.6–14.5)
GLUCOSE SERPL-MCNC: 87 MG/DL (ref 74–108)
GLUCOSE UR STRIP.AUTO-MCNC: >1000 MG/DL
HCT VFR BLD AUTO: 34.5 % (ref 35–45)
HGB BLD-MCNC: 11.3 G/DL (ref 12–16)
HGB UR QL STRIP: NEGATIVE
KETONES UR QL STRIP.AUTO: NEGATIVE MG/DL
LEUKOCYTE ESTERASE UR QL STRIP.AUTO: ABNORMAL
MAGNESIUM SERPL-MCNC: 2.2 MG/DL (ref 1.6–2.6)
MCH RBC QN AUTO: 29 PG (ref 24–34)
MCHC RBC AUTO-ENTMCNC: 32.8 G/DL (ref 31–37)
MCV RBC AUTO: 88.5 FL (ref 78–100)
NITRITE UR QL STRIP.AUTO: NEGATIVE
NRBC # BLD: 0 K/UL (ref 0–0.01)
NRBC BLD-RTO: 0 PER 100 WBC
PH UR STRIP: 5.5 (ref 5–8)
PHOSPHATE SERPL-MCNC: 3.5 MG/DL (ref 2.5–4.9)
PLATELET # BLD AUTO: 246 K/UL (ref 135–420)
PMV BLD AUTO: 10 FL (ref 9.2–11.8)
POTASSIUM SERPL-SCNC: 4 MMOL/L (ref 3.5–5.5)
PROT UR STRIP-MCNC: NEGATIVE MG/DL
RBC # BLD AUTO: 3.9 M/UL (ref 4.2–5.3)
RBC #/AREA URNS HPF: ABNORMAL /HPF (ref 0–2)
SODIUM SERPL-SCNC: 140 MMOL/L (ref 136–145)
SP GR UR REFRACTOMETRY: 1.03 (ref 1–1.03)
UROBILINOGEN UR QL STRIP.AUTO: 1 EU/DL (ref 0.2–1)
WBC # BLD AUTO: 6.8 K/UL (ref 4.6–13.2)
WBC URNS QL MICRO: ABNORMAL /HPF (ref 0–4)

## 2025-07-09 PROCEDURE — 80069 RENAL FUNCTION PANEL: CPT

## 2025-07-09 PROCEDURE — 81001 URINALYSIS AUTO W/SCOPE: CPT

## 2025-07-09 PROCEDURE — 36415 COLL VENOUS BLD VENIPUNCTURE: CPT

## 2025-07-09 PROCEDURE — 85027 COMPLETE CBC AUTOMATED: CPT

## 2025-07-09 PROCEDURE — 82570 ASSAY OF URINE CREATININE: CPT

## 2025-07-09 PROCEDURE — 83735 ASSAY OF MAGNESIUM: CPT

## 2025-07-09 PROCEDURE — 83970 ASSAY OF PARATHORMONE: CPT

## 2025-07-09 PROCEDURE — 82306 VITAMIN D 25 HYDROXY: CPT

## 2025-07-09 PROCEDURE — 84156 ASSAY OF PROTEIN URINE: CPT

## 2025-07-10 LAB
25(OH)D3 SERPL-MCNC: 116 NG/ML (ref 30–100)
CA-I BLD-MCNC: 9.6 MG/DL (ref 8.5–10.1)
CREAT UR-MCNC: 112 MG/DL (ref 30–125)
PROT UR-MCNC: 10 MG/DL (ref 0–12)
PROT/CREAT UR-RTO: 0.1
PTH-INTACT SERPL-MCNC: 34.7 PG/ML (ref 15–65)

## 2025-07-16 ENCOUNTER — OFFICE VISIT (OUTPATIENT)
Age: 80
End: 2025-07-16
Payer: MEDICARE

## 2025-07-16 VITALS
DIASTOLIC BLOOD PRESSURE: 78 MMHG | WEIGHT: 155.2 LBS | HEART RATE: 68 BPM | SYSTOLIC BLOOD PRESSURE: 123 MMHG | BODY MASS INDEX: 25.86 KG/M2 | HEIGHT: 65 IN | OXYGEN SATURATION: 95 %

## 2025-07-16 DIAGNOSIS — N18.31 CHRONIC KIDNEY DISEASE, STAGE 3A (HCC): Primary | ICD-10-CM

## 2025-07-16 PROCEDURE — 1124F ACP DISCUSS-NO DSCNMKR DOCD: CPT | Performed by: NURSE PRACTITIONER

## 2025-07-16 PROCEDURE — 1036F TOBACCO NON-USER: CPT | Performed by: NURSE PRACTITIONER

## 2025-07-16 PROCEDURE — G8399 PT W/DXA RESULTS DOCUMENT: HCPCS | Performed by: NURSE PRACTITIONER

## 2025-07-16 PROCEDURE — 1159F MED LIST DOCD IN RCRD: CPT | Performed by: NURSE PRACTITIONER

## 2025-07-16 PROCEDURE — 99213 OFFICE O/P EST LOW 20 MIN: CPT | Performed by: NURSE PRACTITIONER

## 2025-07-16 PROCEDURE — 1126F AMNT PAIN NOTED NONE PRSNT: CPT | Performed by: NURSE PRACTITIONER

## 2025-07-16 PROCEDURE — G8427 DOCREV CUR MEDS BY ELIG CLIN: HCPCS | Performed by: NURSE PRACTITIONER

## 2025-07-16 PROCEDURE — G8417 CALC BMI ABV UP PARAM F/U: HCPCS | Performed by: NURSE PRACTITIONER

## 2025-07-16 PROCEDURE — 1090F PRES/ABSN URINE INCON ASSESS: CPT | Performed by: NURSE PRACTITIONER

## 2025-07-16 RX ORDER — WARFARIN SODIUM 5 MG/1
5 TABLET ORAL DAILY
COMMUNITY

## 2025-07-16 RX ORDER — SPIRONOLACTONE 50 MG/1
50 TABLET, FILM COATED ORAL DAILY
COMMUNITY
Start: 2025-03-26

## 2025-07-16 NOTE — PROGRESS NOTES
1. Have you been to the ER, urgent care clinic since your last visit?  Hospitalized since your last visit?No    2. Have you seen or consulted any other health care providers outside of the [facility] since your last visit?  Include any pap smears or colon screening. Yes

## 2025-07-16 NOTE — PROGRESS NOTES
Renal Progress Note    Patient: Karla Rossi MRN: [id number]  SSN: [id number]    YOB: 1945  Age: 79 y.o.  Sex: female      History of present illness  Karla Rossi is a pleasant 79 year old female with known history of HTN, CKD stage 3, hyponatremia, HLD. Valvular disease s/p valve replacement. She also has a history of CML followed by [facility]emon[facility] and on oral agents.   Patient is here for a follow-up of her CKD and hyponatremia.     Blood pressure is very well controlled in the office today. She is currently taking Cozaar 100 mg daily along with Spironolactone   She had a previous hospital admission for severe hyponatremia and had been started on salt tablets which have now been discontinued.  Her sodium last visit was 135 and is 140 today.  Baseline creatinine has been 1.3-1.4   Off hydrochlorothiazide for the past 1 year       Subjective:     Patient is feeling better, not giving any new complaints today.   No complaints of any swelling in lower extremities.   No complaints of abdominal or flank pains.  No complaints of voiding difficulties, or hematuria.   No complaints of shortness of breath.         Current Outpatient Medications   Medication Sig    spironolactone (ALDACTONE) 50 MG tablet Take 1 tablet by mouth daily    empagliflozin (JARDIANCE) 10 MG tablet Take 1 tablet by mouth daily    simvastatin (ZOCOR) 20 MG tablet TAKE 1 TABLET BY MOUTH AT BEDTIME    losartan (COZAAR) 100 MG tablet Take 1 tablet by mouth daily    vitamin E 400 UNIT capsule Take 1 capsule by mouth daily    vitamin B-12 (CYANOCOBALAMIN) 1000 MCG tablet Take 1 tablet by mouth daily    Vitamin D-Vitamin K (K2-D3 5000) 5000-90 UNIT-MCG CAPS Take 1 capsule by mouth daily    magnesium (MAGNESIUM-OXIDE) 250 MG TABS tablet Take 1 tablet by mouth daily    Multiple Vitamins-Minerals (MULTIVITAMIN ADULT) CHEW Take 1 tablet by mouth daily    B Complex Vitamins (VITAMIN B COMPLEX PO) Take by mouth    turmeric 500 MG CAPS Take 1

## (undated) DEVICE — WASTEBAG DRIP/ADAPTER: Brand: MEDLINE INDUSTRIES, INC.

## (undated) DEVICE — SURGICAL PROCEDURE TRAY CRD CATH 3 PRT

## (undated) DEVICE — GUIDEWIRE VASC L260CM DIA0.035IN RAD 3MM J TIP L7CM PTFE

## (undated) DEVICE — RADIFOCUS GLIDEWIRE: Brand: GLIDEWIRE

## (undated) DEVICE — SYRINGE MED 10ML RED POLYCARB BRL FIX M LUER CONN FLAT GRP

## (undated) DEVICE — 3M™ TEGADERM™ TRANSPARENT FILM DRESSING FRAME STYLE, 1626W, 4 IN X 4-3/4 IN (10 CM X 12 CM), 50/CT 4CT/CASE: Brand: 3M™ TEGADERM™

## (undated) DEVICE — GLIDESHEATH SLENDER STAINLESS STEEL KIT: Brand: GLIDESHEATH SLENDER

## (undated) DEVICE — CATHETER ANGIO PIG 145 0.045 INX5 FRX110 CM THRULUMEN EXPO

## (undated) DEVICE — CATH 5F 100CM JR40 -- DXTERITY

## (undated) DEVICE — SYRINGE MED 10ML PUR GAM COMPATIBLE POLYCARB FIX M LUER CONN

## (undated) DEVICE — TRANSDUCER PRESSURE WNG 12 IN HSNG SWVL NUT MERITRANS

## (undated) DEVICE — CATH 5F 100CM JL35 -- DXTERITY

## (undated) DEVICE — 3M™ STERI-DRAPE™ SMALL DRAPE WITH ADHESIVE APERTURE 1092 25/BX,4/CS&#X20;: Brand: STERI-DRAPE™

## (undated) DEVICE — BAND COMPR L24CM REG CLR PLAS HEMSTAT EXT HK AND LOOP RETEN